# Patient Record
Sex: MALE | Race: WHITE | Employment: OTHER | URBAN - METROPOLITAN AREA
[De-identification: names, ages, dates, MRNs, and addresses within clinical notes are randomized per-mention and may not be internally consistent; named-entity substitution may affect disease eponyms.]

---

## 2017-02-02 PROBLEM — J44.9 COPD (CHRONIC OBSTRUCTIVE PULMONARY DISEASE) (HCC): Status: ACTIVE | Noted: 2017-02-02

## 2021-12-25 ENCOUNTER — APPOINTMENT (OUTPATIENT)
Dept: GENERAL RADIOLOGY | Age: 66
End: 2021-12-25

## 2021-12-25 ENCOUNTER — HOSPITAL ENCOUNTER (EMERGENCY)
Age: 66
Discharge: LEFT BEFORE DISCHARGE | End: 2021-12-25
Attending: EMERGENCY MEDICINE

## 2021-12-25 VITALS
TEMPERATURE: 98.9 F | OXYGEN SATURATION: 92 % | HEIGHT: 72 IN | WEIGHT: 180 LBS | BODY MASS INDEX: 24.38 KG/M2 | HEART RATE: 104 BPM | SYSTOLIC BLOOD PRESSURE: 177 MMHG | DIASTOLIC BLOOD PRESSURE: 96 MMHG | RESPIRATION RATE: 20 BRPM

## 2021-12-25 DIAGNOSIS — Z20.822 SUSPECTED COVID-19 VIRUS INFECTION: Primary | ICD-10-CM

## 2021-12-25 LAB — SARS-COV-2, NAAT: NOT DETECTED

## 2021-12-25 PROCEDURE — 93005 ELECTROCARDIOGRAM TRACING: CPT | Performed by: STUDENT IN AN ORGANIZED HEALTH CARE EDUCATION/TRAINING PROGRAM

## 2021-12-25 PROCEDURE — 87635 SARS-COV-2 COVID-19 AMP PRB: CPT

## 2021-12-25 PROCEDURE — 99283 EMERGENCY DEPT VISIT LOW MDM: CPT

## 2021-12-25 PROCEDURE — 4500000002 HC ER NO CHARGE

## 2021-12-25 ASSESSMENT — PAIN DESCRIPTION - DESCRIPTORS: DESCRIPTORS: ACHING

## 2021-12-25 ASSESSMENT — ENCOUNTER SYMPTOMS
ABDOMINAL PAIN: 0
DIARRHEA: 0
VOMITING: 0
WHEEZING: 0
EYE REDNESS: 0
NAUSEA: 0
COUGH: 0
EYE PAIN: 0
SINUS PRESSURE: 0
EYE DISCHARGE: 0
SHORTNESS OF BREATH: 0
SORE THROAT: 0
BACK PAIN: 0

## 2021-12-25 ASSESSMENT — PAIN SCALES - GENERAL: PAINLEVEL_OUTOF10: 8

## 2021-12-25 ASSESSMENT — PAIN DESCRIPTION - LOCATION: LOCATION: HEAD

## 2021-12-25 ASSESSMENT — PAIN DESCRIPTION - FREQUENCY: FREQUENCY: CONTINUOUS

## 2021-12-25 ASSESSMENT — PAIN DESCRIPTION - PAIN TYPE: TYPE: ACUTE PAIN

## 2021-12-25 NOTE — ED NOTES
Pt arrives ambulatory from triage. Pt c/o covid symptoms. C/O body aches and loss of taste and smell.  Ambulatory pulse ox 89%     [facility] PATRICIA PETIT  12/25/21 1023

## 2021-12-25 NOTE — ED PROVIDER NOTES
The history is provided by the patient and medical records. 69-year-old male presents emergency department complaint of fatigue body aches. He states he does have some slight shortness of breath however that is his normal baseline due to smoking cigarettes. Denies anything worse than usual.  Denies any chest pain. Nothing makes his symptoms better or worse. States he was recommended to his wife in the hospital he was admitted for COVID-19 yesterday however due to this feeling of fatigue and body aches he wanted to come in to get checked out for Covid as well. He otherwise denies any other acute symptoms. The patient presents with fatigue body aches that has been going on for 1 day. These symptoms are moderate in severity. Symptoms are made better by nothing. Symptoms are made worse by nothing. Associated symptoms include none. Review of Systems   Constitutional: Negative for chills and fever. HENT: Negative for ear pain, sinus pressure and sore throat. Eyes: Negative for pain, discharge and redness. Respiratory: Negative for cough, shortness of breath and wheezing. Sob, however no change from base line     Cardiovascular: Negative for chest pain. Gastrointestinal: Negative for abdominal pain, diarrhea, nausea and vomiting. Genitourinary: Negative for dysuria and frequency. Musculoskeletal: Negative for arthralgias and back pain. Skin: Negative for rash and wound. Neurological: Negative for weakness and headaches. Hematological: Negative for adenopathy. All other systems reviewed and are negative. Physical Exam  Vitals and nursing note reviewed. Constitutional:       General: He is not in acute distress. Appearance: Normal appearance. He is well-developed and normal weight. He is not toxic-appearing. HENT:      Head: Normocephalic and atraumatic. Eyes:      General: No scleral icterus.      Conjunctiva/sclera: Conjunctivae normal.   Cardiovascular: Rate and Rhythm: Normal rate and regular rhythm. Heart sounds: Normal heart sounds. No murmur heard. Pulmonary:      Effort: Pulmonary effort is normal. No respiratory distress. Breath sounds: Normal breath sounds. No wheezing or rales. Abdominal:      General: Bowel sounds are normal.      Palpations: Abdomen is soft. Tenderness: There is no abdominal tenderness. There is no guarding or rebound. Musculoskeletal:         General: No swelling, tenderness or deformity. Cervical back: Normal range of motion and neck supple. Skin:     General: Skin is warm and dry. Coloration: Skin is not jaundiced. Neurological:      General: No focal deficit present. Mental Status: He is alert and oriented to person, place, and time. Psychiatric:         Mood and Affect: Mood normal.          Procedures     MDM     70-year-old male present emergency department complaint of fatigue and body aches wanted to get tested for COVID-19 due to his wife recently tested positive and is now mated to the hospital.  Patient did ambulate and pulse ox dropped to 89%. Blood work and testing was ordered. However patient got a call from his brother who apparently is now in town. Patient states he has not seen his brother in 2 years and wants to leave [address] to go see his brother. I did strongly advise him that due to his symptoms and his wife having COVID-19 that he most likely also has COVID-19 and he needs to self quarantine and should not see his brother. Patient was adamant that he needs to go see his brother at this time. And sign out [address] at this time.   Set of vitals here in the emergency department was hemodynamically stable.                    --------------------------------------------- PAST HISTORY ---------------------------------------------  Past Medical History:  has a past medical history of Alcoholic ([facility] [address] [facility] Depression, Depression, Hep B w/ coma, chronic, w/ delta ([facility] [address] Hep C w/ coma, chronic, Heroin abuse ([facility] Heroin abuse ([facility] Sleep apnea, obstructive, Suicide attempt ([facility] and Suicide attempt ([facility] Past Surgical History:  has a past surgical history that includes bronchoscopy (11/30/2015) and Thoracoscopy (Right, 12/04/15). Social History:  reports that he has never smoked. He has never used smokeless tobacco. He reports that he does not drink alcohol and does not use drugs. Family History: family history includes Cancer in his father, mother, and sister. The patients home medications have been reviewed. Allergies: Patient has no known allergies. -------------------------------------------------- RESULTS -------------------------------------------------  Labs:  Results for orders placed or performed during the hospital encounter of 12/25/21   COVID-19, Rapid    Specimen: Nasopharyngeal Swab   Result Value Ref Range    SARS-CoV-2, NAAT Not Detected Not Detected       Radiology:  No results found.    ------------------------- NURSING NOTES AND VITALS REVIEWED ---------------------------  Date / Time Roomed:  12/25/2021  9:54 AM  ED Bed Assignment:  14B/14B-14    The nursing notes within the ED encounter and vital signs as below have been reviewed. BP (!) 177/96   Pulse 104   Temp 98.9 °F (37.2 °C) (Oral)   Resp 20   Ht 6' (1.829 m)   Wt 180 lb (81.6 kg)   SpO2 92%   BMI 24.41 kg/m²   Oxygen Saturation Interpretation: 89% ambulatory, 93% room air rest      ------------------------------------------ PROGRESS NOTES ------------------------------------------  Time: 1030  Re-evaluation. Patients symptoms show no change  Repeat physical examination is not changed    I have spoken with the patient and discussed todays availabe results to this point, in addition to providing specific details for the plan of care and counseling regarding the diagnosis and prognosis.   Their questions are answered, however they are not agreeable to admission.     --------------------------------- ADDITIONAL PROVIDER NOTES ---------------------------------  This patient has chosen to leave against medical advice. I have personally explained to them that choosing to do so may result in permanent bodily harm or death. I discussed at length that without further evaluation and monitoring there may be unforeseen circumstances and deterioration resulting in permanent bodily harm or death as a result of their choice. They are alert, oriented, and competent at this time. They state that they are aware of the serious risks as explained, but they continue to wish to leave against medical   advice. In light of their decision to leave against medical advice, follow-up has been arranged and they are aware of the importance of following up as instructed. They have been advised that they should return to the ED immediately if they change their mind at any time, or if their condition begins to change or worsen. The follow up plan has been discussed in detail and they are aware of the specific conditions for emergent return, as well as the importance of follow-up. New Prescriptions    No medications on file       Diagnosis:  1. Suspected COVID-19 virus infection        Disposition:  Patient's disposition: Left against medical advice. Patient's condition is stable.        Rae Munoz MD  Resident  12/25/21 7534

## 2021-12-25 NOTE — ED NOTES
Bed: 14B-14  Expected date:   Expected time:   Means of arrival:   Comments:  Triage      Michael Marin RN  12/25/21 0657

## 2021-12-28 LAB
EKG ATRIAL RATE: 88 BPM
EKG P AXIS: 44 DEGREES
EKG P-R INTERVAL: 112 MS
EKG Q-T INTERVAL: 388 MS
EKG QRS DURATION: 96 MS
EKG QTC CALCULATION (BAZETT): 469 MS
EKG R AXIS: 75 DEGREES
EKG T AXIS: 62 DEGREES
EKG VENTRICULAR RATE: 88 BPM

## 2022-03-26 ENCOUNTER — HOSPITAL ENCOUNTER (INPATIENT)
Age: 67
LOS: 4 days | Discharge: HOME | DRG: 392 | End: 2022-03-30
Attending: EMERGENCY MEDICINE | Admitting: FAMILY MEDICINE
Payer: OTHER GOVERNMENT

## 2022-03-26 ENCOUNTER — APPOINTMENT (OUTPATIENT)
Dept: GENERAL RADIOLOGY | Age: 67
DRG: 392 | End: 2022-03-26
Payer: OTHER GOVERNMENT

## 2022-03-26 ENCOUNTER — APPOINTMENT (OUTPATIENT)
Dept: CT IMAGING | Age: 67
DRG: 392 | End: 2022-03-26
Payer: OTHER GOVERNMENT

## 2022-03-26 DIAGNOSIS — R19.7 NAUSEA VOMITING AND DIARRHEA: ICD-10-CM

## 2022-03-26 DIAGNOSIS — R10.9 ABDOMINAL PAIN, UNSPECIFIED ABDOMINAL LOCATION: Primary | ICD-10-CM

## 2022-03-26 DIAGNOSIS — E87.6 HYPOKALEMIA: ICD-10-CM

## 2022-03-26 DIAGNOSIS — R11.2 NAUSEA VOMITING AND DIARRHEA: ICD-10-CM

## 2022-03-26 DIAGNOSIS — R94.31 QT PROLONGATION: ICD-10-CM

## 2022-03-26 LAB
ALBUMIN SERPL-MCNC: 4.3 G/DL (ref 3.5–5.2)
ALP BLD-CCNC: 81 U/L (ref 40–129)
ALT SERPL-CCNC: 15 U/L (ref 0–40)
AMPHETAMINE SCREEN, URINE: NOT DETECTED
ANION GAP SERPL CALCULATED.3IONS-SCNC: 20 MMOL/L (ref 7–16)
AST SERPL-CCNC: 20 U/L (ref 0–39)
BACTERIA: ABNORMAL /HPF
BARBITURATE SCREEN URINE: NOT DETECTED
BASOPHILS ABSOLUTE: 0.02 E9/L (ref 0–0.2)
BASOPHILS RELATIVE PERCENT: 0.2 % (ref 0–2)
BENZODIAZEPINE SCREEN, URINE: NOT DETECTED
BILIRUB SERPL-MCNC: 0.4 MG/DL (ref 0–1.2)
BILIRUBIN URINE: ABNORMAL
BLOOD, URINE: ABNORMAL
BUN BLDV-MCNC: 33 MG/DL (ref 6–23)
CALCIUM SERPL-MCNC: 9.9 MG/DL (ref 8.6–10.2)
CANNABINOID SCREEN URINE: NOT DETECTED
CHLORIDE BLD-SCNC: 95 MMOL/L (ref 98–107)
CLARITY: CLEAR
CO2: 24 MMOL/L (ref 22–29)
COCAINE METABOLITE SCREEN URINE: NOT DETECTED
COLOR: YELLOW
CREAT SERPL-MCNC: 1.1 MG/DL (ref 0.7–1.2)
EOSINOPHILS ABSOLUTE: 0 E9/L (ref 0.05–0.5)
EOSINOPHILS RELATIVE PERCENT: 0 % (ref 0–6)
ETHANOL: <10 MG/DL (ref 0–0.08)
FENTANYL SCREEN, URINE: POSITIVE
GFR AFRICAN AMERICAN: >60
GFR NON-AFRICAN AMERICAN: >60 ML/MIN/1.73
GLUCOSE BLD-MCNC: 150 MG/DL (ref 74–99)
GLUCOSE URINE: NEGATIVE MG/DL
HCT VFR BLD CALC: 46.7 % (ref 37–54)
HEMOGLOBIN: 16.1 G/DL (ref 12.5–16.5)
HYALINE CASTS: ABNORMAL /LPF (ref 0–2)
IMMATURE GRANULOCYTES #: 0.05 E9/L
IMMATURE GRANULOCYTES %: 0.6 % (ref 0–5)
KETONES, URINE: 15 MG/DL
LACTIC ACID: 2 MMOL/L (ref 0.5–2.2)
LACTIC ACID: 4.5 MMOL/L (ref 0.5–2.2)
LACTIC ACID: 5.4 MMOL/L (ref 0.5–2.2)
LEUKOCYTE ESTERASE, URINE: NEGATIVE
LIPASE: 17 U/L (ref 13–60)
LYMPHOCYTES ABSOLUTE: 1.13 E9/L (ref 1.5–4)
LYMPHOCYTES RELATIVE PERCENT: 13.3 % (ref 20–42)
Lab: ABNORMAL
MAGNESIUM: 1.6 MG/DL (ref 1.6–2.6)
MCH RBC QN AUTO: 30.6 PG (ref 26–35)
MCHC RBC AUTO-ENTMCNC: 34.5 % (ref 32–34.5)
MCV RBC AUTO: 88.8 FL (ref 80–99.9)
METHADONE SCREEN, URINE: POSITIVE
MONOCYTES ABSOLUTE: 0.39 E9/L (ref 0.1–0.95)
MONOCYTES RELATIVE PERCENT: 4.6 % (ref 2–12)
NEUTROPHILS ABSOLUTE: 6.88 E9/L (ref 1.8–7.3)
NEUTROPHILS RELATIVE PERCENT: 81.3 % (ref 43–80)
NITRITE, URINE: NEGATIVE
OPIATE SCREEN URINE: NOT DETECTED
OXYCODONE URINE: NOT DETECTED
PDW BLD-RTO: 13.8 FL (ref 11.5–15)
PH UA: 6 (ref 5–9)
PHENCYCLIDINE SCREEN URINE: NOT DETECTED
PLATELET # BLD: 329 E9/L (ref 130–450)
PMV BLD AUTO: 10.2 FL (ref 7–12)
POTASSIUM SERPL-SCNC: 3.1 MMOL/L (ref 3.5–5)
PROTEIN UA: 100 MG/DL
RBC # BLD: 5.26 E12/L (ref 3.8–5.8)
RBC UA: ABNORMAL /HPF (ref 0–2)
REASON FOR REJECTION: NORMAL
REJECTED TEST: NORMAL
SODIUM BLD-SCNC: 139 MMOL/L (ref 132–146)
SPECIFIC GRAVITY UA: 1.02 (ref 1–1.03)
TOTAL PROTEIN: 9 G/DL (ref 6.4–8.3)
TROPONIN, HIGH SENSITIVITY: 10 NG/L (ref 0–11)
UROBILINOGEN, URINE: 0.2 E.U./DL
WBC # BLD: 8.5 E9/L (ref 4.5–11.5)
WBC UA: ABNORMAL /HPF (ref 0–5)

## 2022-03-26 PROCEDURE — 6370000000 HC RX 637 (ALT 250 FOR IP): Performed by: FAMILY MEDICINE

## 2022-03-26 PROCEDURE — 87040 BLOOD CULTURE FOR BACTERIA: CPT

## 2022-03-26 PROCEDURE — 36415 COLL VENOUS BLD VENIPUNCTURE: CPT

## 2022-03-26 PROCEDURE — 93005 ELECTROCARDIOGRAM TRACING: CPT | Performed by: NURSE PRACTITIONER

## 2022-03-26 PROCEDURE — 6360000002 HC RX W HCPCS: Performed by: FAMILY MEDICINE

## 2022-03-26 PROCEDURE — 83690 ASSAY OF LIPASE: CPT

## 2022-03-26 PROCEDURE — 80307 DRUG TEST PRSMV CHEM ANLYZR: CPT

## 2022-03-26 PROCEDURE — 82077 ASSAY SPEC XCP UR&BREATH IA: CPT

## 2022-03-26 PROCEDURE — 83605 ASSAY OF LACTIC ACID: CPT

## 2022-03-26 PROCEDURE — A4216 STERILE WATER/SALINE, 10 ML: HCPCS | Performed by: FAMILY MEDICINE

## 2022-03-26 PROCEDURE — 99285 EMERGENCY DEPT VISIT HI MDM: CPT

## 2022-03-26 PROCEDURE — 80053 COMPREHEN METABOLIC PANEL: CPT

## 2022-03-26 PROCEDURE — 2580000003 HC RX 258: Performed by: FAMILY MEDICINE

## 2022-03-26 PROCEDURE — 74177 CT ABD & PELVIS W/CONTRAST: CPT

## 2022-03-26 PROCEDURE — 85025 COMPLETE CBC W/AUTO DIFF WBC: CPT

## 2022-03-26 PROCEDURE — 96372 THER/PROPH/DIAG INJ SC/IM: CPT

## 2022-03-26 PROCEDURE — 2580000003 HC RX 258: Performed by: NURSE PRACTITIONER

## 2022-03-26 PROCEDURE — 6360000004 HC RX CONTRAST MEDICATION: Performed by: RADIOLOGY

## 2022-03-26 PROCEDURE — 87088 URINE BACTERIA CULTURE: CPT

## 2022-03-26 PROCEDURE — 6360000002 HC RX W HCPCS: Performed by: EMERGENCY MEDICINE

## 2022-03-26 PROCEDURE — C9113 INJ PANTOPRAZOLE SODIUM, VIA: HCPCS | Performed by: FAMILY MEDICINE

## 2022-03-26 PROCEDURE — 83735 ASSAY OF MAGNESIUM: CPT

## 2022-03-26 PROCEDURE — 6360000002 HC RX W HCPCS: Performed by: NURSE PRACTITIONER

## 2022-03-26 PROCEDURE — 2580000003 HC RX 258: Performed by: EMERGENCY MEDICINE

## 2022-03-26 PROCEDURE — 96374 THER/PROPH/DIAG INJ IV PUSH: CPT

## 2022-03-26 PROCEDURE — 81001 URINALYSIS AUTO W/SCOPE: CPT

## 2022-03-26 PROCEDURE — 96361 HYDRATE IV INFUSION ADD-ON: CPT

## 2022-03-26 PROCEDURE — 71045 X-RAY EXAM CHEST 1 VIEW: CPT

## 2022-03-26 PROCEDURE — 2580000003 HC RX 258: Performed by: RADIOLOGY

## 2022-03-26 PROCEDURE — 84484 ASSAY OF TROPONIN QUANT: CPT

## 2022-03-26 PROCEDURE — 1200000000 HC SEMI PRIVATE

## 2022-03-26 RX ORDER — MAGNESIUM SULFATE IN WATER 40 MG/ML
2000 INJECTION, SOLUTION INTRAVENOUS ONCE
Status: COMPLETED | OUTPATIENT
Start: 2022-03-26 | End: 2022-03-26

## 2022-03-26 RX ORDER — LORAZEPAM 2 MG/ML
3 INJECTION INTRAMUSCULAR
Status: DISCONTINUED | OUTPATIENT
Start: 2022-03-26 | End: 2022-03-30 | Stop reason: HOSPADM

## 2022-03-26 RX ORDER — METOCLOPRAMIDE HYDROCHLORIDE 5 MG/ML
10 INJECTION INTRAMUSCULAR; INTRAVENOUS ONCE
Status: COMPLETED | OUTPATIENT
Start: 2022-03-26 | End: 2022-03-26

## 2022-03-26 RX ORDER — MULTIVITAMIN WITH IRON
1 TABLET ORAL DAILY
Status: DISCONTINUED | OUTPATIENT
Start: 2022-03-26 | End: 2022-03-30 | Stop reason: HOSPADM

## 2022-03-26 RX ORDER — DOXAZOSIN MESYLATE 1 MG/1
1 TABLET ORAL DAILY
Status: DISCONTINUED | OUTPATIENT
Start: 2022-03-26 | End: 2022-03-30 | Stop reason: HOSPADM

## 2022-03-26 RX ORDER — HYDRALAZINE HYDROCHLORIDE 20 MG/ML
10 INJECTION INTRAMUSCULAR; INTRAVENOUS EVERY 6 HOURS PRN
Status: DISCONTINUED | OUTPATIENT
Start: 2022-03-26 | End: 2022-03-26 | Stop reason: SDUPTHER

## 2022-03-26 RX ORDER — GAUZE BANDAGE 2" X 2"
100 BANDAGE TOPICAL DAILY
Status: DISCONTINUED | OUTPATIENT
Start: 2022-03-26 | End: 2022-03-30 | Stop reason: HOSPADM

## 2022-03-26 RX ORDER — LORAZEPAM 1 MG/1
4 TABLET ORAL
Status: DISCONTINUED | OUTPATIENT
Start: 2022-03-26 | End: 2022-03-30 | Stop reason: HOSPADM

## 2022-03-26 RX ORDER — 0.9 % SODIUM CHLORIDE 0.9 %
1000 INTRAVENOUS SOLUTION INTRAVENOUS ONCE
Status: COMPLETED | OUTPATIENT
Start: 2022-03-26 | End: 2022-03-26

## 2022-03-26 RX ORDER — ACETAMINOPHEN 650 MG/1
650 SUPPOSITORY RECTAL EVERY 6 HOURS PRN
Status: DISCONTINUED | OUTPATIENT
Start: 2022-03-26 | End: 2022-03-30 | Stop reason: HOSPADM

## 2022-03-26 RX ORDER — AMLODIPINE BESYLATE 5 MG/1
5 TABLET ORAL DAILY
Status: DISCONTINUED | OUTPATIENT
Start: 2022-03-26 | End: 2022-03-29

## 2022-03-26 RX ORDER — SODIUM CHLORIDE 9 MG/ML
25 INJECTION, SOLUTION INTRAVENOUS PRN
Status: DISCONTINUED | OUTPATIENT
Start: 2022-03-26 | End: 2022-03-30 | Stop reason: HOSPADM

## 2022-03-26 RX ORDER — SODIUM CHLORIDE 0.9 % (FLUSH) 0.9 %
5-40 SYRINGE (ML) INJECTION EVERY 12 HOURS SCHEDULED
Status: DISCONTINUED | OUTPATIENT
Start: 2022-03-26 | End: 2022-03-30 | Stop reason: HOSPADM

## 2022-03-26 RX ORDER — ONDANSETRON 2 MG/ML
4 INJECTION INTRAMUSCULAR; INTRAVENOUS EVERY 4 HOURS PRN
Status: DISCONTINUED | OUTPATIENT
Start: 2022-03-26 | End: 2022-03-26

## 2022-03-26 RX ORDER — METOPROLOL SUCCINATE 25 MG/1
25 TABLET, EXTENDED RELEASE ORAL 2 TIMES DAILY
Status: DISCONTINUED | OUTPATIENT
Start: 2022-03-26 | End: 2022-03-29

## 2022-03-26 RX ORDER — FOLIC ACID 1 MG/1
1 TABLET ORAL DAILY
Status: DISCONTINUED | OUTPATIENT
Start: 2022-03-26 | End: 2022-03-30 | Stop reason: HOSPADM

## 2022-03-26 RX ORDER — SODIUM CHLORIDE 0.9 % (FLUSH) 0.9 %
5-40 SYRINGE (ML) INJECTION PRN
Status: DISCONTINUED | OUTPATIENT
Start: 2022-03-26 | End: 2022-03-30 | Stop reason: HOSPADM

## 2022-03-26 RX ORDER — ACETAMINOPHEN 325 MG/1
650 TABLET ORAL EVERY 6 HOURS PRN
Status: DISCONTINUED | OUTPATIENT
Start: 2022-03-26 | End: 2022-03-30 | Stop reason: HOSPADM

## 2022-03-26 RX ORDER — LORAZEPAM 2 MG/ML
2 INJECTION INTRAMUSCULAR
Status: DISCONTINUED | OUTPATIENT
Start: 2022-03-26 | End: 2022-03-30 | Stop reason: HOSPADM

## 2022-03-26 RX ORDER — HYDRALAZINE HYDROCHLORIDE 20 MG/ML
10 INJECTION INTRAMUSCULAR; INTRAVENOUS ONCE
Status: COMPLETED | OUTPATIENT
Start: 2022-03-26 | End: 2022-03-26

## 2022-03-26 RX ORDER — FENTANYL CITRATE 50 UG/ML
25 INJECTION, SOLUTION INTRAMUSCULAR; INTRAVENOUS ONCE
Status: COMPLETED | OUTPATIENT
Start: 2022-03-26 | End: 2022-03-26

## 2022-03-26 RX ORDER — POTASSIUM CHLORIDE 7.45 MG/ML
10 INJECTION INTRAVENOUS
Status: DISPENSED | OUTPATIENT
Start: 2022-03-26 | End: 2022-03-26

## 2022-03-26 RX ORDER — SODIUM CHLORIDE 0.9 % (FLUSH) 0.9 %
10 SYRINGE (ML) INJECTION PRN
Status: DISCONTINUED | OUTPATIENT
Start: 2022-03-26 | End: 2022-03-30 | Stop reason: HOSPADM

## 2022-03-26 RX ORDER — ARFORMOTEROL TARTRATE 15 UG/2ML
15 SOLUTION RESPIRATORY (INHALATION) 2 TIMES DAILY
Status: DISCONTINUED | OUTPATIENT
Start: 2022-03-26 | End: 2022-03-30 | Stop reason: HOSPADM

## 2022-03-26 RX ORDER — IPRATROPIUM BROMIDE AND ALBUTEROL SULFATE 2.5; .5 MG/3ML; MG/3ML
3 SOLUTION RESPIRATORY (INHALATION) EVERY 4 HOURS PRN
Status: DISCONTINUED | OUTPATIENT
Start: 2022-03-26 | End: 2022-03-30 | Stop reason: HOSPADM

## 2022-03-26 RX ORDER — MAGNESIUM HYDROXIDE/ALUMINUM HYDROXICE/SIMETHICONE 120; 1200; 1200 MG/30ML; MG/30ML; MG/30ML
30 SUSPENSION ORAL EVERY 6 HOURS PRN
Status: DISCONTINUED | OUTPATIENT
Start: 2022-03-26 | End: 2022-03-30 | Stop reason: HOSPADM

## 2022-03-26 RX ORDER — MAGNESIUM SULFATE HEPTAHYDRATE 500 MG/ML
2000 INJECTION, SOLUTION INTRAMUSCULAR; INTRAVENOUS ONCE
Status: DISCONTINUED | OUTPATIENT
Start: 2022-03-26 | End: 2022-03-26 | Stop reason: CLARIF

## 2022-03-26 RX ORDER — HYDRALAZINE HYDROCHLORIDE 20 MG/ML
10 INJECTION INTRAMUSCULAR; INTRAVENOUS EVERY 6 HOURS PRN
Status: DISCONTINUED | OUTPATIENT
Start: 2022-03-26 | End: 2022-03-30 | Stop reason: HOSPADM

## 2022-03-26 RX ORDER — ONDANSETRON 2 MG/ML
4 INJECTION INTRAMUSCULAR; INTRAVENOUS ONCE
Status: COMPLETED | OUTPATIENT
Start: 2022-03-26 | End: 2022-03-26

## 2022-03-26 RX ORDER — BUDESONIDE 0.5 MG/2ML
500 INHALANT ORAL 2 TIMES DAILY
Status: DISCONTINUED | OUTPATIENT
Start: 2022-03-26 | End: 2022-03-30 | Stop reason: HOSPADM

## 2022-03-26 RX ORDER — LORAZEPAM 2 MG/ML
4 INJECTION INTRAMUSCULAR
Status: DISCONTINUED | OUTPATIENT
Start: 2022-03-26 | End: 2022-03-30 | Stop reason: HOSPADM

## 2022-03-26 RX ORDER — POLYETHYLENE GLYCOL 3350 17 G/17G
17 POWDER, FOR SOLUTION ORAL DAILY PRN
Status: DISCONTINUED | OUTPATIENT
Start: 2022-03-26 | End: 2022-03-30 | Stop reason: HOSPADM

## 2022-03-26 RX ORDER — LORAZEPAM 1 MG/1
3 TABLET ORAL
Status: DISCONTINUED | OUTPATIENT
Start: 2022-03-26 | End: 2022-03-30 | Stop reason: HOSPADM

## 2022-03-26 RX ORDER — METHADONE HYDROCHLORIDE 10 MG/ML
110 CONCENTRATE ORAL DAILY
Status: DISCONTINUED | OUTPATIENT
Start: 2022-03-26 | End: 2022-03-30 | Stop reason: HOSPADM

## 2022-03-26 RX ORDER — LORAZEPAM 1 MG/1
2 TABLET ORAL
Status: DISCONTINUED | OUTPATIENT
Start: 2022-03-26 | End: 2022-03-30 | Stop reason: HOSPADM

## 2022-03-26 RX ORDER — LORAZEPAM 2 MG/ML
1 INJECTION INTRAMUSCULAR
Status: DISCONTINUED | OUTPATIENT
Start: 2022-03-26 | End: 2022-03-30 | Stop reason: HOSPADM

## 2022-03-26 RX ORDER — CALCIUM CARBONATE 200(500)MG
500 TABLET,CHEWABLE ORAL 3 TIMES DAILY PRN
Status: DISCONTINUED | OUTPATIENT
Start: 2022-03-26 | End: 2022-03-30 | Stop reason: HOSPADM

## 2022-03-26 RX ORDER — LORAZEPAM 1 MG/1
1 TABLET ORAL
Status: DISCONTINUED | OUTPATIENT
Start: 2022-03-26 | End: 2022-03-30 | Stop reason: HOSPADM

## 2022-03-26 RX ADMIN — ONDANSETRON 4 MG: 2 INJECTION INTRAMUSCULAR; INTRAVENOUS at 01:28

## 2022-03-26 RX ADMIN — METOPROLOL SUCCINATE 25 MG: 25 TABLET, EXTENDED RELEASE ORAL at 15:37

## 2022-03-26 RX ADMIN — DOXAZOSIN 1 MG: 1 TABLET ORAL at 15:37

## 2022-03-26 RX ADMIN — IOPAMIDOL 90 ML: 755 INJECTION, SOLUTION INTRAVENOUS at 04:21

## 2022-03-26 RX ADMIN — SODIUM CHLORIDE 40 MG: 9 INJECTION INTRAMUSCULAR; INTRAVENOUS; SUBCUTANEOUS at 09:27

## 2022-03-26 RX ADMIN — AMLODIPINE BESYLATE 5 MG: 5 TABLET ORAL at 15:37

## 2022-03-26 RX ADMIN — THIAMINE HCL TAB 100 MG 100 MG: 100 TAB at 15:37

## 2022-03-26 RX ADMIN — METOPROLOL SUCCINATE 25 MG: 25 TABLET, EXTENDED RELEASE ORAL at 19:59

## 2022-03-26 RX ADMIN — MAGNESIUM SULFATE HEPTAHYDRATE 2000 MG: 40 INJECTION, SOLUTION INTRAVENOUS at 05:34

## 2022-03-26 RX ADMIN — FENTANYL CITRATE 25 MCG: 50 INJECTION INTRAMUSCULAR; INTRAVENOUS at 02:10

## 2022-03-26 RX ADMIN — ACETAMINOPHEN 650 MG: 325 TABLET ORAL at 23:34

## 2022-03-26 RX ADMIN — Medication 10 ML: at 20:00

## 2022-03-26 RX ADMIN — HYDRALAZINE HYDROCHLORIDE 10 MG: 20 INJECTION INTRAMUSCULAR; INTRAVENOUS at 05:18

## 2022-03-26 RX ADMIN — POTASSIUM CHLORIDE 10 MEQ: 7.46 INJECTION, SOLUTION INTRAVENOUS at 05:23

## 2022-03-26 RX ADMIN — Medication 10 ML: at 04:21

## 2022-03-26 RX ADMIN — Medication 110 MG: at 17:32

## 2022-03-26 RX ADMIN — ENOXAPARIN SODIUM 40 MG: 100 INJECTION SUBCUTANEOUS at 15:37

## 2022-03-26 RX ADMIN — FOLIC ACID 1 MG: 1 TABLET ORAL at 15:37

## 2022-03-26 RX ADMIN — TRIMETHOBENZAMIDE HYDROCHLORIDE 200 MG: 100 INJECTION INTRAMUSCULAR at 02:49

## 2022-03-26 RX ADMIN — METOCLOPRAMIDE HYDROCHLORIDE 10 MG: 5 INJECTION INTRAMUSCULAR; INTRAVENOUS at 05:21

## 2022-03-26 RX ADMIN — SODIUM CHLORIDE 1000 ML: 9 INJECTION, SOLUTION INTRAVENOUS at 02:55

## 2022-03-26 RX ADMIN — SODIUM CHLORIDE 1000 ML: 9 INJECTION, SOLUTION INTRAVENOUS at 01:46

## 2022-03-26 RX ADMIN — LORAZEPAM 1 MG: 1 TABLET ORAL at 20:01

## 2022-03-26 RX ADMIN — ACETAMINOPHEN 650 MG: 325 TABLET ORAL at 15:37

## 2022-03-26 RX ADMIN — SODIUM CHLORIDE 40 MG: 9 INJECTION INTRAMUSCULAR; INTRAVENOUS; SUBCUTANEOUS at 19:58

## 2022-03-26 RX ADMIN — TRIMETHOBENZAMIDE HYDROCHLORIDE 200 MG: 100 INJECTION INTRAMUSCULAR at 09:17

## 2022-03-26 ASSESSMENT — PAIN SCALES - GENERAL
PAINLEVEL_OUTOF10: 10
PAINLEVEL_OUTOF10: 9
PAINLEVEL_OUTOF10: 9
PAINLEVEL_OUTOF10: 3
PAINLEVEL_OUTOF10: 7
PAINLEVEL_OUTOF10: 8

## 2022-03-26 ASSESSMENT — PAIN DESCRIPTION - LOCATION
LOCATION: HEAD
LOCATION: ABDOMEN
LOCATION: HEAD
LOCATION: ABDOMEN

## 2022-03-26 ASSESSMENT — PAIN DESCRIPTION - FREQUENCY
FREQUENCY: CONTINUOUS

## 2022-03-26 ASSESSMENT — PAIN DESCRIPTION - PROGRESSION: CLINICAL_PROGRESSION: NOT CHANGED

## 2022-03-26 ASSESSMENT — PAIN DESCRIPTION - DESCRIPTORS
DESCRIPTORS: SHARP
DESCRIPTORS: ACHING
DESCRIPTORS: ACHING

## 2022-03-26 ASSESSMENT — PAIN DESCRIPTION - PAIN TYPE
TYPE: ACUTE PAIN

## 2022-03-26 ASSESSMENT — PAIN DESCRIPTION - ONSET: ONSET: SUDDEN

## 2022-03-26 ASSESSMENT — PAIN DESCRIPTION - ORIENTATION: ORIENTATION: MID

## 2022-03-26 NOTE — ED NOTES
Patient's oxygen level 88% on room air, placed patient on 3L, pulse ox 95%     Mg Barbosa RN  03/26/22 [address], RN  03/26/22 8820

## 2022-03-26 NOTE — ED NOTES
Angle Landers RN notified [facility] faxed, patient placed in transport & POC discussed.      Mayra Cristobal RN  03/26/22 4364

## 2022-03-26 NOTE — ED PROVIDER NOTES
HPI:  3/26/22, Time: 1:00 AM EDT         Deann Vivas is a 78 y.o. male presenting to the ED for nausea vomiting and diarrhea, beginning 2 days ago. The complaint has been persistent, moderate in severity, and worsened by nothing. Patient reporting nausea vomiting and diarrhea and reporting no black or tarry stools. Patient reporting abdominal pain no chest pain no difficulty breathing reports no syncopal event. Patient reporting no fever no chills. Patient reporting no leg pain. Patient reporting feeling weak. He reports no urinary symptoms. ROS:   Pertinent positives and negatives are stated within HPI, all other systems reviewed and are negative.  --------------------------------------------- PAST HISTORY ---------------------------------------------  Past Medical History:  has a past medical history of Alcoholic ([facility] [address] CHF (congestive heart failure) ([facility] [address] Depression, Depression, Hep B w/ coma, chronic, w/ delta ([facility] [address] Hep C w/ coma, chronic, [facility] ([facility] Sleep apnea, obstructive, [facility] ([facility] and Suicide attempt ([facility] Past Surgical History:  has a past surgical history that includes bronchoscopy (11/30/2015) and Thoracoscopy (Right, 12/04/15). Social History:  reports that he has never smoked. He has never used smokeless tobacco. He reports that he does not drink alcohol and does not use drugs. Family History: family history includes Cancer in his father, mother, and sister. The patients home medications have been reviewed. Allergies: Patient has no known allergies.     ---------------------------------------------------PHYSICAL EXAM--------------------------------------    Constitutional/General: Alert and oriented x3, mild distress pale appearing  Head: Normocephalic and atraumatic  Eyes: PERRL, EOMI  Mouth: Oropharynx clear, handling secretions, no trismus  Neck: Supple, full ROM, non tender to palpation in the midline, no stridor, no crepitus, no meningeal signs  Pulmonary: Lungs clear to auscultation bilaterally, no wheezes, rales, or rhonchi. Not in respiratory distress  Cardiovascular:  Regular rate. Regular rhythm. No murmurs, gallops, or rubs. 2+ distal pulses  Chest: no chest wall tenderness  Abdomen: Soft. Tender throughout. Non distended. +BS. No rebound, guarding, or rigidity. No pulsatile masses appreciated. Musculoskeletal: Moves all extremities x 4. Warm and well perfused, no clubbing, cyanosis, or edema. Capillary refill <3 seconds  Skin: warm and dry. No rashes. Neurologic: GCS 15, CN 2-12 grossly intact, no focal deficits, symmetric strength 5/5 in the upper and lower extremities bilaterally  Psych: Normal Affect    -------------------------------------------------- RESULTS -------------------------------------------------  I have personally reviewed all laboratory and imaging results for this patient. Results are listed below.      LABS:  Results for orders placed or performed during the hospital encounter of 03/26/22   CBC with Auto Differential   Result Value Ref Range    WBC 8.5 4.5 - 11.5 E9/L    RBC 5.26 3.80 - 5.80 E12/L    Hemoglobin 16.1 12.5 - 16.5 g/dL    Hematocrit 46.7 37.0 - 54.0 %    MCV 88.8 80.0 - 99.9 fL    MCH 30.6 26.0 - 35.0 pg    MCHC 34.5 32.0 - 34.5 %    RDW 13.8 11.5 - 15.0 fL    Platelets 004 544 - 112 E9/L    MPV 10.2 7.0 - 12.0 fL    Neutrophils % 81.3 (H) 43.0 - 80.0 %    Immature Granulocytes % 0.6 0.0 - 5.0 %    Lymphocytes % 13.3 (L) 20.0 - 42.0 %    Monocytes % 4.6 2.0 - 12.0 %    Eosinophils % 0.0 0.0 - 6.0 %    Basophils % 0.2 0.0 - 2.0 %    Neutrophils Absolute 6.88 1.80 - 7.30 E9/L    Immature Granulocytes # 0.05 E9/L    Lymphocytes Absolute 1.13 (L) 1.50 - 4.00 E9/L    Monocytes Absolute 0.39 0.10 - 0.95 E9/L    Eosinophils Absolute 0.00 (L) 0.05 - 0.50 E9/L    Basophils Absolute 0.02 0.00 - 0.20 E9/L   Lactic Acid   Result Value Ref Range    Lactic Acid 5.4 (HH) 0.5 - 2.2 mmol/L   Lipase Result Value Ref Range    Lipase 17 13 - 60 U/L   Urinalysis   Result Value Ref Range    Color, UA Yellow Straw/Yellow    Clarity, UA Clear Clear    Glucose, Ur Negative Negative mg/dL    Bilirubin Urine SMALL (A) Negative    Ketones, Urine 15 (A) Negative mg/dL    Specific Gravity, UA 1.025 1.005 - 1.030    Blood, Urine LARGE (A) Negative    pH, UA 6.0 5.0 - 9.0    Protein,  (A) Negative mg/dL    Urobilinogen, Urine 0.2 <2.0 E.U./dL    Nitrite, Urine Negative Negative    Leukocyte Esterase, Urine Negative Negative   URINE DRUG SCREEN   Result Value Ref Range    Amphetamine Screen, Urine NOT DETECTED Negative <1000 ng/mL    Barbiturate Screen, Ur NOT DETECTED Negative < 200 ng/mL    Benzodiazepine Screen, Urine NOT DETECTED Negative < 200 ng/mL    Cannabinoid Scrn, Ur NOT DETECTED Negative < 50ng/mL    Cocaine Metabolite Screen, Urine NOT DETECTED Negative < 300 ng/mL    Opiate Scrn, Ur NOT DETECTED Negative < 300ng/mL    PCP Screen, Urine NOT DETECTED Negative < 25 ng/mL    Methadone Screen, Urine POSITIVE (A) Negative <300 ng/mL    Oxycodone Urine NOT DETECTED Negative <100 ng/mL    FENTANYL SCREEN, URINE POSITIVE (A) Negative <1 ng/mL    Drug Screen Comment: see below    SPECIMEN REJECTION   Result Value Ref Range    Rejected Test trp5, cmp     Reason for Rejection see below    Comprehensive Metabolic Panel   Result Value Ref Range    Sodium 139 132 - 146 mmol/L    Potassium 3.1 (L) 3.5 - 5.0 mmol/L    Chloride 95 (L) 98 - 107 mmol/L    CO2 24 22 - 29 mmol/L    Anion Gap 20 (H) 7 - 16 mmol/L    Glucose 150 (H) 74 - 99 mg/dL    BUN 33 (H) 6 - 23 mg/dL    CREATININE 1.1 0.7 - 1.2 mg/dL    GFR Non-African American >60 >=60 mL/min/1.73    GFR African American >60     Calcium 9.9 8.6 - 10.2 mg/dL    Total Protein 9.0 (H) 6.4 - 8.3 g/dL    Albumin 4.3 3.5 - 5.2 g/dL    Total Bilirubin 0.4 0.0 - 1.2 mg/dL    Alkaline Phosphatase 81 40 - 129 U/L    ALT 15 0 - 40 U/L    AST 20 0 - 39 U/L   Troponin   Result Value Ref Range    Troponin, High Sensitivity 10 0 - 11 ng/L   Microscopic Urinalysis   Result Value Ref Range    Hyaline Casts, UA 3-5 (A) 0 - 2 /LPF    WBC, UA NONE 0 - 5 /HPF    RBC, UA 5-10 (A) 0 - 2 /HPF    Bacteria, UA MODERATE (A) None Seen /HPF   Magnesium   Result Value Ref Range    Magnesium 1.6 1.6 - 2.6 mg/dL   Lactic Acid   Result Value Ref Range    Lactic Acid 4.5 (HH) 0.5 - 2.2 mmol/L   EKG 12 Lead   Result Value Ref Range    Ventricular Rate 89 BPM    Atrial Rate 89 BPM    P-R Interval 130 ms    QRS Duration 92 ms    Q-T Interval 484 ms    QTc Calculation (Bazett) 588 ms    P Axis 61 degrees    R Axis 65 degrees    T Axis 65 degrees       RADIOLOGY:  Interpreted by Radiologist.  CT ABDOMEN PELVIS W IV CONTRAST Additional Contrast? None   Final Result   No acute abdominopelvic abnormality. Focus of intravesicular gas, likely related to recent instrumentation. Clinical correlation recommended. Correlate with urinalysis to exclude   infection. Thickening of the distal esophageal wall, which could suggest reflux   esophagitis. Clinical correlation recommended. XR CHEST PORTABLE   Final Result   No acute cardiopulmonary abnormality. EKG:  This EKG is signed and interpreted by me. Rate: 89  Rhythm: Sinus  Interpretation: non-specific EKG, QT is prolonged  Comparison: Compared to previous          ------------------------- NURSING NOTES AND VITALS REVIEWED ---------------------------   The nursing notes within the ED encounter and vital signs as below have been reviewed by myself. BP (!) 165/76   Pulse 87   Temp 97.2 °F (36.2 °C)   Resp 18   Ht 6' (1.829 m)   Wt 175 lb (79.4 kg)   SpO2 100%   BMI 23.73 kg/m²   Oxygen Saturation Interpretation: Normal    The patients available past medical records and past encounters were reviewed.         ------------------------------ ED COURSE/MEDICAL DECISION MAKING----------------------  Medications   trimethobenzamide Rosalina Parish) injection 200 mg (200 mg IntraMUSCular Given 3/26/22 0249)   sodium chloride flush 0.9 % injection 10 mL (10 mLs IntraVENous Given 3/26/22 0421)   potassium chloride 10 mEq/100 mL IVPB (Peripheral Line) (10 mEq IntraVENous New Bag 3/26/22 0523)   magnesium sulfate 2000 mg in 50 mL IVPB premix (2,000 mg IntraVENous New Bag 3/26/22 0534)   0.9 % sodium chloride bolus (0 mLs IntraVENous Stopped 3/26/22 0251)   ondansetron (ZOFRAN) injection 4 mg (4 mg IntraVENous Given 3/26/22 0128)   fentaNYL (SUBLIMAZE) injection 25 mcg (25 mcg IntraVENous Given 3/26/22 0210)   0.9 % sodium chloride bolus (0 mLs IntraVENous Stopped 3/26/22 0435)   iopamidol (ISOVUE-370) 76 % injection 90 mL (90 mLs IntraVENous Given 3/26/22 0421)   metoclopramide (REGLAN) injection 10 mg (10 mg IntraVENous Given 3/26/22 0521)   hydrALAZINE (APRESOLINE) injection 10 mg (10 mg IntraVENous Given 3/26/22 0518)             Medical Decision Making:    Patient presenting because of nausea vomiting and diarrhea. Patient reporting also having abdominal pain he reports no chest pain. There is history of substance abuse. Patient reportedly on methadone. Patient here is awake alert. Patient was in mild to moderate distress diffusely tender in his abdomen patient hypertensive. Patient was given Zofran prior to arrival.  Patient given IV fluids he was also given Zofran EKG did show QT prolongation the Zofran was given before EKG was done. Patient was given Tigan here for nausea. Patient still having nausea. He was medicated for his hypertension as well as for his pain. Patient magnesium note noted patient was ordered magnesium IV. Patient was rechecked several times still having abdominal pain nausea. Patient will be admitted. Re-Evaluations:             Re-evaluation. Patients symptoms show no change    Patient reevaluated several times still having symptoms. Patient reporting no chest pain or difficulty breathing.   Patient was given

## 2022-03-26 NOTE — H&P
Hospital Medicine History & Physical      PCP: No primary care provider on file. Date of Admission: 3/26/2022    Date of Service: 3/26/22  Chief Complaint:  N/V     History Of Present Illness:              Rachel Flores is a 78 y.o. complains of  nausea vomiting and diarrhea, beginning 2 days ago. Patient reporting abdominal pain no chest pain no difficulty breathing reports no syncopal event. Patient reporting no fever no chills. Patient reporting no leg pain. Patient reporting feeling weak. He reports no urinary symptoms. Past Medical History:          Diagnosis Date    Alcoholic ([address]     [facility]cong[facility])     Depression     Depression     Hep B w/ coma, chronic, w/ delta (HCC)     Hep C w/ coma, chronic     [facility]     Heroin abuse ([facility]     Sleep apnea, obstructive     [facility]     Suicide attempt [facility])        Past Surgical History:          Procedure Laterality Date    BRONCHOSCOPY  11/30/2015    With BAL and Biopsies    THORACOSCOPY Right 12/04/15    with lung biopsy       Medications Prior to Admission:      Prior to Admission medications    Medication Sig Start Date End Date Taking?  Authorizing Provider   predniSONE (DELTASONE) 10 MG tablet 40 mg daily x 3days,30 mg daily x 3 days, then 20mg daily x 3 days, 10 mg daily x 3days, then stop 7/14/17   Macie Green DO   ondansetron (ZOFRAN ODT) 4 MG disintegrating tablet Take 1 tablet by mouth every 8 hours as needed for Nausea or Vomiting  Patient taking differently: Take 4 mg by mouth every morning  4/21/17   Ro Cox MD   dicyclomine (BENTYL) 10 MG capsule Take 1 capsule by mouth 4 times daily (before meals and nightly) for 5 days 3/28/17 7/11/17  Nadia Lemos MD   lansoprazole (PREVACID) 30 MG delayed release capsule Take 1 capsule by mouth daily 3/28/17   Nadia Lemos MD   aspirin 81 MG chewable tablet Take 1 tablet by mouth daily 12/17/15   Wenceslao Aguilar DO metoprolol (TOPROL-XL) 25 MG XL tablet Take 1 tablet by mouth 2 times daily 12/17/15   [place] CALEB Aguilar, DO   amLODIPine (NORVASC) 5 MG tablet Take 1 tablet by mouth daily 12/17/15   [place] L Jeff, DO   gabapentin (NEURONTIN) 100 MG capsule Take 2 capsules by mouth 3 times daily  Patient taking differently: Take 600 mg by mouth 3 times daily  12/17/15   Wenceslao Aguilar DO   Arformoterol Tartrate (BROVANA) 15 MCG/2ML NEBU Take 2 mLs by nebulization 2 times daily 12/16/15   Hamilton Doan MD   budesonide (PULMICORT) 0.5 MG/2ML nebulizer suspension Take 2 mLs by nebulization 2 times daily 12/16/15   Hamilton Doan MD   ipratropium-albuterol (DUONEB) 0.5-2.5 (3) MG/3ML SOLN nebulizer solution Inhale 3 mLs into the lungs every 4 hours (while awake) 12/16/15   Hamilton Doan MD   ibuprofen (ADVIL;MOTRIN) 600 MG tablet Take 600 mg by mouth every 8 hours    Historical Provider, MD   methadone (DOLOPHINE) 10 MG tablet Take 100 mg by mouth daily 8 am    Historical Provider, MD   terazosin (HYTRIN) 1 MG capsule Take by mouth nightly Patient does not know dose    Historical Provider, MD   tadalafil (CIALIS) 10 MG tablet Take 10 mg by mouth as needed for Erectile Dysfunction. Historical Provider, MD       Allergies:  Patient has no known allergies. Social History:      The patient currently lives     TOBACCO:   reports that he has never smoked. He has never used smokeless tobacco.  ETOH:   reports no history of alcohol use. Family History:       Reviewed in detail and negative for DM, CAD, Cancer, CVA. Positive as follows:HTN        Problem Relation Age of Onset    Cancer Mother     Cancer Father     Cancer Sister        REVIEW OF SYSTEMS:   Pertinent positives as noted in the HPI. All other systems reviewed and negative.     PHYSICAL EXAM:    BP (!) 188/98   Pulse 115   Temp 97.2 °F (36.2 °C)   Resp 21   Ht 6' (1.829 m)   Wt 175 lb (79.4 kg)   SpO2 92%   BMI 23.73 kg/m²     General appearance:  No apparent distress, appears stated age and cooperative. HEENT:  Normal cephalic, atraumatic without obvious deformity. Pupils equal, round, and reactive to light. Extra ocular muscles intact. Conjunctivae/corneas clear. Neck: Supple, with full range of motion. No jugular venous distention. Trachea midline. Respiratory:  Normal respiratory effort. Clear to auscultation, bilaterally without Rales/Wheezes/Rhonchi. Cardiovascular:  Regular rate and rhythm with normal S1/S2 without murmurs, rubs or gallops. Abdomen: Soft, non-tender, non-distended with normal bowel sounds. Musculoskeletal:  No clubbing, cyanosis or edema bilaterally. Full range of motion without deformity. Skin: Skin color, texture, turgor normal.  No rashes or lesions. Neurologic:  Neurovascularly intact without any focal sensory/motor deficits. Cranial nerves: II-XII intact, grossly non-focal.  Psychiatric:  Alert and oriented, thought content appropriate, normal insight    CXR:  I have reviewed the CXR with the following interpretation:   EKG:  I have reviewed the EKG with the following interpretation:     Labs:     Recent Labs     03/26/22  0121   WBC 8.5   HGB 16.1   HCT 46.7        Recent Labs     03/26/22 0227      K 3.1*   CL 95*   CO2 24   BUN 33*   CREATININE 1.1   CALCIUM 9.9     Recent Labs     03/26/22 0227   AST 20   ALT 15   BILITOT 0.4   ALKPHOS 81     No results for input(s): INR in the last 72 hours. No results for input(s): Thersia Millers in the last 72 hours.     Urinalysis:      Lab Results   Component Value Date    NITRU Negative 03/26/2022    WBCUA NONE 03/26/2022    WBCUA 0-1 06/28/2011    BACTERIA MODERATE 03/26/2022    RBCUA 5-10 03/26/2022    RBCUA 1-3 06/28/2011    BLOODU LARGE 03/26/2022    SPECGRAV 1.025 03/26/2022    GLUCOSEU Negative 03/26/2022    GLUCOSEU NEGATIVE 06/28/2011         ASSESSMENT:    Active Hospital Problems    Diagnosis Date Noted    Intractable nausea and vomiting [R11.2] 03/26/2022   --Assessment  Intractable nausea and vomiting  Hypertensive urgency  Alcohol abuse  hep B  CHF  Lactic acidosis  History of heroin abuse, on methadone  Electrolyte imbalance    PLAN:    .CT abd pelvis :  No acute abdominopelvic abnormality.   Focus of intravesicular gas, likely related to recent instrumentation. Clinical correlation recommended.  Correlate with urinalysis to exclude  infection.   Thickening of the distal esophageal wall, which could suggest reflux  esophagitis.  Clinical correlation recommended. Start antiemetics, avoid Zofran as QTC is prolonged. IV PPI, IV fluids. CIWA protocol. Check with methadone and verify dosing with methadone clinic discussed with nursing staff    Replace electrolytes and recheck  Obtain cultures  DVT Prophylaxis:   Diet: No diet orders on file  Code Status: Prior    PT/OT Eval Status:     Janice Goldberg MD  Thank you No primary care provider on file. for the opportunity to be involved in this patient's care. If you have any questions or concerns please feel free to contact me through [facility].

## 2022-03-27 ENCOUNTER — APPOINTMENT (OUTPATIENT)
Dept: CT IMAGING | Age: 67
DRG: 392 | End: 2022-03-27
Payer: OTHER GOVERNMENT

## 2022-03-27 LAB
ALBUMIN SERPL-MCNC: 3.7 G/DL (ref 3.5–5.2)
ALP BLD-CCNC: 58 U/L (ref 40–129)
ALT SERPL-CCNC: 13 U/L (ref 0–40)
ANION GAP SERPL CALCULATED.3IONS-SCNC: 12 MMOL/L (ref 7–16)
AST SERPL-CCNC: 28 U/L (ref 0–39)
BASOPHILS ABSOLUTE: 0.02 E9/L (ref 0–0.2)
BASOPHILS RELATIVE PERCENT: 0.2 % (ref 0–2)
BILIRUB SERPL-MCNC: 0.4 MG/DL (ref 0–1.2)
BUN BLDV-MCNC: 30 MG/DL (ref 6–23)
CALCIUM SERPL-MCNC: 8.7 MG/DL (ref 8.6–10.2)
CHLORIDE BLD-SCNC: 100 MMOL/L (ref 98–107)
CO2: 25 MMOL/L (ref 22–29)
CREAT SERPL-MCNC: 0.8 MG/DL (ref 0.7–1.2)
EOSINOPHILS ABSOLUTE: 0.02 E9/L (ref 0.05–0.5)
EOSINOPHILS RELATIVE PERCENT: 0.2 % (ref 0–6)
GFR AFRICAN AMERICAN: >60
GFR NON-AFRICAN AMERICAN: >60 ML/MIN/1.73
GLUCOSE BLD-MCNC: 105 MG/DL (ref 74–99)
HCT VFR BLD CALC: 36.3 % (ref 37–54)
HEMOGLOBIN: 12 G/DL (ref 12.5–16.5)
IMMATURE GRANULOCYTES #: 0.05 E9/L
IMMATURE GRANULOCYTES %: 0.4 % (ref 0–5)
LYMPHOCYTES ABSOLUTE: 2.72 E9/L (ref 1.5–4)
LYMPHOCYTES RELATIVE PERCENT: 24 % (ref 20–42)
MAGNESIUM: 2.3 MG/DL (ref 1.6–2.6)
MCH RBC QN AUTO: 31.3 PG (ref 26–35)
MCHC RBC AUTO-ENTMCNC: 33.1 % (ref 32–34.5)
MCV RBC AUTO: 94.5 FL (ref 80–99.9)
MONOCYTES ABSOLUTE: 1.19 E9/L (ref 0.1–0.95)
MONOCYTES RELATIVE PERCENT: 10.5 % (ref 2–12)
NEUTROPHILS ABSOLUTE: 7.32 E9/L (ref 1.8–7.3)
NEUTROPHILS RELATIVE PERCENT: 64.7 % (ref 43–80)
PDW BLD-RTO: 14.4 FL (ref 11.5–15)
PLATELET # BLD: 218 E9/L (ref 130–450)
PMV BLD AUTO: 10.4 FL (ref 7–12)
POTASSIUM SERPL-SCNC: 3.1 MMOL/L (ref 3.5–5)
RBC # BLD: 3.84 E12/L (ref 3.8–5.8)
SODIUM BLD-SCNC: 137 MMOL/L (ref 132–146)
TOTAL PROTEIN: 7.1 G/DL (ref 6.4–8.3)
WBC # BLD: 11.3 E9/L (ref 4.5–11.5)

## 2022-03-27 PROCEDURE — 36415 COLL VENOUS BLD VENIPUNCTURE: CPT

## 2022-03-27 PROCEDURE — 85025 COMPLETE CBC W/AUTO DIFF WBC: CPT

## 2022-03-27 PROCEDURE — 94664 DEMO&/EVAL PT USE INHALER: CPT

## 2022-03-27 PROCEDURE — 6360000002 HC RX W HCPCS: Performed by: FAMILY MEDICINE

## 2022-03-27 PROCEDURE — A4216 STERILE WATER/SALINE, 10 ML: HCPCS | Performed by: FAMILY MEDICINE

## 2022-03-27 PROCEDURE — C9113 INJ PANTOPRAZOLE SODIUM, VIA: HCPCS | Performed by: FAMILY MEDICINE

## 2022-03-27 PROCEDURE — 2700000000 HC OXYGEN THERAPY PER DAY

## 2022-03-27 PROCEDURE — 70450 CT HEAD/BRAIN W/O DYE: CPT

## 2022-03-27 PROCEDURE — 1200000000 HC SEMI PRIVATE

## 2022-03-27 PROCEDURE — 6370000000 HC RX 637 (ALT 250 FOR IP): Performed by: FAMILY MEDICINE

## 2022-03-27 PROCEDURE — 6370000000 HC RX 637 (ALT 250 FOR IP): Performed by: INTERNAL MEDICINE

## 2022-03-27 PROCEDURE — 83735 ASSAY OF MAGNESIUM: CPT

## 2022-03-27 PROCEDURE — 2580000003 HC RX 258: Performed by: FAMILY MEDICINE

## 2022-03-27 PROCEDURE — 94640 AIRWAY INHALATION TREATMENT: CPT

## 2022-03-27 PROCEDURE — 80053 COMPREHEN METABOLIC PANEL: CPT

## 2022-03-27 RX ORDER — POTASSIUM CHLORIDE 20 MEQ/1
40 TABLET, EXTENDED RELEASE ORAL ONCE
Status: COMPLETED | OUTPATIENT
Start: 2022-03-27 | End: 2022-03-27

## 2022-03-27 RX ORDER — FAMOTIDINE 20 MG/1
20 TABLET, FILM COATED ORAL DAILY
Status: DISCONTINUED | OUTPATIENT
Start: 2022-03-27 | End: 2022-03-29

## 2022-03-27 RX ORDER — AMOXICILLIN AND CLAVULANATE POTASSIUM 875; 125 MG/1; MG/1
1 TABLET, FILM COATED ORAL EVERY 12 HOURS SCHEDULED
Status: DISCONTINUED | OUTPATIENT
Start: 2022-03-28 | End: 2022-03-30 | Stop reason: HOSPADM

## 2022-03-27 RX ORDER — SUCRALFATE 1 G/1
1 TABLET ORAL EVERY 6 HOURS SCHEDULED
Status: DISCONTINUED | OUTPATIENT
Start: 2022-03-27 | End: 2022-03-30 | Stop reason: HOSPADM

## 2022-03-27 RX ADMIN — METOPROLOL SUCCINATE 25 MG: 25 TABLET, EXTENDED RELEASE ORAL at 08:51

## 2022-03-27 RX ADMIN — SODIUM CHLORIDE 40 MG: 9 INJECTION INTRAMUSCULAR; INTRAVENOUS; SUBCUTANEOUS at 08:51

## 2022-03-27 RX ADMIN — ACETAMINOPHEN 650 MG: 325 TABLET ORAL at 08:51

## 2022-03-27 RX ADMIN — POTASSIUM CHLORIDE 40 MEQ: 20 TABLET, EXTENDED RELEASE ORAL at 09:42

## 2022-03-27 RX ADMIN — Medication 10 ML: at 08:53

## 2022-03-27 RX ADMIN — ACETAMINOPHEN 650 MG: 325 TABLET ORAL at 17:24

## 2022-03-27 RX ADMIN — METOPROLOL SUCCINATE 25 MG: 25 TABLET, EXTENDED RELEASE ORAL at 20:33

## 2022-03-27 RX ADMIN — AMLODIPINE BESYLATE 5 MG: 5 TABLET ORAL at 08:52

## 2022-03-27 RX ADMIN — FAMOTIDINE 20 MG: 20 TABLET ORAL at 17:25

## 2022-03-27 RX ADMIN — FOLIC ACID 1 MG: 1 TABLET ORAL at 08:51

## 2022-03-27 RX ADMIN — Medication 10 ML: at 20:40

## 2022-03-27 RX ADMIN — BUDESONIDE 500 MCG: 0.5 SUSPENSION RESPIRATORY (INHALATION) at 20:58

## 2022-03-27 RX ADMIN — ACETAMINOPHEN 650 MG: 325 TABLET ORAL at 23:11

## 2022-03-27 RX ADMIN — BUDESONIDE 500 MCG: 0.5 SUSPENSION RESPIRATORY (INHALATION) at 08:30

## 2022-03-27 RX ADMIN — Medication 1 TABLET: at 08:50

## 2022-03-27 RX ADMIN — DOXAZOSIN 1 MG: 1 TABLET ORAL at 08:50

## 2022-03-27 RX ADMIN — Medication 110 MG: at 09:43

## 2022-03-27 RX ADMIN — THIAMINE HCL TAB 100 MG 100 MG: 100 TAB at 08:51

## 2022-03-27 RX ADMIN — ARFORMOTEROL TARTRATE 15 MCG: 15 SOLUTION RESPIRATORY (INHALATION) at 08:30

## 2022-03-27 RX ADMIN — SODIUM CHLORIDE 40 MG: 9 INJECTION INTRAMUSCULAR; INTRAVENOUS; SUBCUTANEOUS at 20:33

## 2022-03-27 RX ADMIN — ENOXAPARIN SODIUM 40 MG: 100 INJECTION SUBCUTANEOUS at 08:52

## 2022-03-27 RX ADMIN — ARFORMOTEROL TARTRATE 15 MCG: 15 SOLUTION RESPIRATORY (INHALATION) at 20:58

## 2022-03-27 RX ADMIN — SODIUM CHLORIDE, PRESERVATIVE FREE 10 ML: 5 INJECTION INTRAVENOUS at 20:41

## 2022-03-27 RX ADMIN — SUCRALFATE 1 G: 1 TABLET ORAL at 17:25

## 2022-03-27 ASSESSMENT — PAIN DESCRIPTION - ONSET: ONSET: ON-GOING

## 2022-03-27 ASSESSMENT — PAIN DESCRIPTION - FREQUENCY: FREQUENCY: CONTINUOUS

## 2022-03-27 ASSESSMENT — PAIN DESCRIPTION - LOCATION
LOCATION: ABDOMEN
LOCATION: ABDOMEN
LOCATION: HEAD

## 2022-03-27 ASSESSMENT — PAIN SCALES - GENERAL
PAINLEVEL_OUTOF10: 10
PAINLEVEL_OUTOF10: 4
PAINLEVEL_OUTOF10: 5
PAINLEVEL_OUTOF10: 6
PAINLEVEL_OUTOF10: 8
PAINLEVEL_OUTOF10: 9
PAINLEVEL_OUTOF10: 8

## 2022-03-27 ASSESSMENT — PAIN SCALES - WONG BAKER
WONGBAKER_NUMERICALRESPONSE: 0
WONGBAKER_NUMERICALRESPONSE: 0

## 2022-03-27 ASSESSMENT — PAIN DESCRIPTION - PAIN TYPE
TYPE: ACUTE PAIN
TYPE: ACUTE PAIN

## 2022-03-27 ASSESSMENT — PAIN DESCRIPTION - DESCRIPTORS
DESCRIPTORS: ACHING;NAGGING;DISCOMFORT
DESCRIPTORS: SHARP;CRAMPING;ACHING
DESCRIPTORS: ACHING;CRAMPING;DISCOMFORT

## 2022-03-27 ASSESSMENT — PAIN DESCRIPTION - ORIENTATION: ORIENTATION: LOWER;MID

## 2022-03-27 NOTE — PROGRESS NOTES
Message to Dr Osorio Sauceda regarding order for telemetry monitoring related to ability to utilize telemetry monitoring on current floor.

## 2022-03-27 NOTE — PLAN OF CARE
Problem: Falls - Risk of:  Goal: Will remain free from falls  Description: Will remain free from falls  Outcome: Met This Shift     Problem: Falls - Risk of:  Goal: Absence of physical injury  Description: Absence of physical injury  Outcome: Met This Shift     Problem: Pain:  Goal: Pain level will decrease  Description: Pain level will decrease  Outcome: Ongoing     Problem: Pain:  Goal: Control of acute pain  Description: Control of acute pain  Outcome: Ongoing

## 2022-03-27 NOTE — PROGRESS NOTES
Hospitalist Progress Note      SYNOPSIS: Patient admitted on 3/26/2022 for       SUBJECTIVE:    Patient seen and examined. He states that he has burning abd pain; +nauseous. No vomiting. +headache. Stable overnight. No other overnight issues reported. Temp (24hrs), Av.6 °F (36.4 °C), Min:96.8 °F (36 °C), Max:98.5 °F (36.9 °C)    DIET: ADULT DIET; Clear Liquid  CODE: Full Code    Intake/Output Summary (Last 24 hours) at 3/27/2022 1621  Last data filed at 3/27/2022 1129  Gross per 24 hour   Intake 240 ml   Output 100 ml   Net 140 ml       OBJECTIVE:    BP (!) 107/56   Pulse 50   Temp 96.8 °F (36 °C) (Infrared)   Resp 17   Ht 6' (1.829 m)   Wt 175 lb (79.4 kg)   SpO2 96%   BMI 23.73 kg/m²     General appearance: No apparent distress, appears stated age and cooperative. HEENT:  Conjunctivae/corneas clear. Neck: Supple. No jugular venous distention. Respiratory: Clear to auscultation bilaterally, normal respiratory effort  Cardiovascular: Regular rate rhythm, normal S1-S2  Abdomen: Soft, TP in the R. Liver, epigastric pain, LUQ pain. Musculoskeletal: No clubbing, cyanosis, no bilateral lower extremity edema. Brisk capillary refill. Skin:  No rashes  on visible skin  Neurologic: awake, alert and following commands     ASSESSMENT:  66 y.o. H/o ETOH, CHF, depression, Hep B/Hep C with coma, heroin abuse, sleep apnea, H/o SI complains of nausea vomiting and diarrhea, beginning 2 days ago. PLAN:  1.) Abd pain: CT abd pelvis :No acute abdominopelvic abnormality. Focus of intravesicular gas, likely related to recent instrumentation. Clinical correlation recommended.  Correlate with urinalysis to exclude infection. Thickening of the distal esophageal wall, which could suggest reflux esophagitis.  Clinical correlation recommended. Urine cx is pending. Clear liquid. Patient on protonix BID; carafate started; famotidine daily started. Will nee GI F/u.     LFTS/Lipase are wnl.   2.) Headache: CT head pending.   3.) HTN urgency: Controlled. 4.) ETOH abuse: May be exacerbating cause for #1; methadone/thiamine/MVI   5.) Hep B/Hep C   6.) CHF   7.) Lactic acidosis: resolved  8.) Hypokalemia: supplemented.    9.) DVT proph: lovenox       DISPOSITION:    Medications:  REVIEWED DAILY      Infusion Medications    sodium chloride      sodium chloride       Scheduled Medications    famotidine  20 mg Oral Daily    sucralfate  1 g Oral 4 times per day    pantoprazole (PROTONIX) 40 mg injection  40 mg IntraVENous BID    amLODIPine  5 mg Oral Daily    Arformoterol Tartrate  15 mcg Nebulization BID    budesonide  500 mcg Nebulization BID    metoprolol succinate  25 mg Oral BID    doxazosin  1 mg Oral Daily    sodium chloride flush  5-40 mL IntraVENous 2 times per day    enoxaparin  40 mg SubCUTAneous Daily    multivitamin  1 tablet Oral Daily    thiamine mononitrate  100 mg Oral Daily    folic acid  1 mg Oral Daily    sodium chloride flush  5-40 mL IntraVENous 2 times per day    methadone  110 mg Oral Daily     PRN Meds: sodium chloride flush, calcium carbonate, aluminum & magnesium hydroxide-simethicone, trimethobenzamide, ipratropium-albuterol, sodium chloride flush, sodium chloride, polyethylene glycol, acetaminophen **OR** acetaminophen, hydrALAZINE, sodium chloride flush, sodium chloride, LORazepam **OR** LORazepam **OR** LORazepam **OR** LORazepam **OR** LORazepam **OR** LORazepam **OR** LORazepam **OR** LORazepam        Labs:     Recent Labs     03/26/22 0121 03/27/22 0431   WBC 8.5 11.3   HGB 16.1 12.0*   HCT 46.7 36.3*    218       Recent Labs     03/26/22 0227 03/27/22 0431    137   K 3.1* 3.1*   CL 95* 100   CO2 24 25   BUN 33* 30*   CREATININE 1.1 0.8   CALCIUM 9.9 8.7       Recent Labs     03/26/22 0121 03/26/22 0227 03/27/22 0431   PROT  --  9.0* 7.1   ALKPHOS  --  81 58   ALT  --  15 13   AST  --  20 28   BILITOT  --  0.4 0.4   LIPASE 17  --   --        No results for input(s): INR in the last 72 hours. No results for input(s): Jackelyn Galaviz in the last 72 hours. Chronic labs:    Lab Results   Component Value Date    TSH 1.120 11/26/2015    INR 0.9 12/10/2015    LABA1C 5.5 11/26/2015       Radiology: REVIEWED DAILY    +++++++++++++++++++++++++++++++++++++++++++++++++  Yolanda Washington MD  [facility] [address]  +++++++++++++++++++++++++++++++++++++++++++++++++  NOTE: This report was transcribed using voice recognition software. Every effort was made to ensure accuracy; however, inadvertent computerized transcription errors may be present.

## 2022-03-27 NOTE — PROGRESS NOTES
Hospitalist Progress Note      SYNOPSIS: Patient admitted on 3/26/2022 for       SUBJECTIVE:    Patient seen and examined. He states that he has burning abd pain; +nauseous. No vomiting. +headache. Stable overnight. No other overnight issues reported. Temp (24hrs), Av.6 °F (36.4 °C), Min:96.8 °F (36 °C), Max:98.5 °F (36.9 °C)    DIET: ADULT DIET; Clear Liquid  CODE: Full Code    Intake/Output Summary (Last 24 hours) at 3/27/2022 1632  Last data filed at 3/27/2022 1129  Gross per 24 hour   Intake 240 ml   Output 100 ml   Net 140 ml       OBJECTIVE:    BP (!) 107/56   Pulse 50   Temp 96.8 °F (36 °C) (Infrared)   Resp 17   Ht 6' (1.829 m)   Wt 175 lb (79.4 kg)   SpO2 96%   BMI 23.73 kg/m²     General appearance: No apparent distress, appears stated age and cooperative. HEENT:  Conjunctivae/corneas clear. Neck: Supple. No jugular venous distention. Respiratory: Clear to auscultation bilaterally, normal respiratory effort  Cardiovascular: Regular rate rhythm, normal S1-S2  Abdomen: Soft, TP in the R. Liver, epigastric pain, LUQ pain. Musculoskeletal: No clubbing, cyanosis, no bilateral lower extremity edema. Brisk capillary refill. Skin:  No rashes  on visible skin  Neurologic: awake, alert and following commands     ASSESSMENT:  66 y.o. H/o ETOH, CHF, depression, Hep B/Hep C with coma, heroin abuse, sleep apnea, H/o SI complains of nausea vomiting and diarrhea, beginning 2 days ago. PLAN:  1.) Abd pain: CT abd pelvis :No acute abdominopelvic abnormality. Focus of intravesicular gas, likely related to recent instrumentation. Clinical correlation recommended.  Correlate with urinalysis to exclude infection. Thickening of the distal esophageal wall, which could suggest reflux esophagitis.  Clinical correlation recommended. Urine cx is pending. Clear liquid- patient wants to try try full liquids. Patient on protonix BID; carafate started; famotidine daily started.  Will need GI F/u- possible OP for EGD. LFTS/Lipase are wnl.   2.) Headache: CT head:   Impression   No acute intracranial abnormality or hemorrhage.       Periventricular leukomalacia.       Right maxillary sinusitis. Will start on Augmentin to finish a 10 day course. 3.) HTN urgency: Controlled. 4.) ETOH abuse: May be exacerbating cause for #1; methadone/thiamine/MVI. CIWA scoring.   5.) Hep B/Hep C   6.) CHF   7.) Lactic acidosis: resolved  8.) Hypokalemia: supplemented.    9.) DVT proph: lovenox       DISPOSITION: TBD    Medications:  REVIEWED DAILY      Infusion Medications    sodium chloride      sodium chloride       Scheduled Medications    famotidine  20 mg Oral Daily    sucralfate  1 g Oral 4 times per day    pantoprazole (PROTONIX) 40 mg injection  40 mg IntraVENous BID    amLODIPine  5 mg Oral Daily    Arformoterol Tartrate  15 mcg Nebulization BID    budesonide  500 mcg Nebulization BID    metoprolol succinate  25 mg Oral BID    doxazosin  1 mg Oral Daily    sodium chloride flush  5-40 mL IntraVENous 2 times per day    enoxaparin  40 mg SubCUTAneous Daily    multivitamin  1 tablet Oral Daily    thiamine mononitrate  100 mg Oral Daily    folic acid  1 mg Oral Daily    sodium chloride flush  5-40 mL IntraVENous 2 times per day    methadone  110 mg Oral Daily     PRN Meds: sodium chloride flush, calcium carbonate, aluminum & magnesium hydroxide-simethicone, trimethobenzamide, ipratropium-albuterol, sodium chloride flush, sodium chloride, polyethylene glycol, acetaminophen **OR** acetaminophen, hydrALAZINE, sodium chloride flush, sodium chloride, LORazepam **OR** LORazepam **OR** LORazepam **OR** LORazepam **OR** LORazepam **OR** LORazepam **OR** LORazepam **OR** LORazepam        Labs:     Recent Labs     03/26/22  0121 03/27/22  0431   WBC 8.5 11.3   HGB 16.1 12.0*   HCT 46.7 36.3*    218       Recent Labs     03/26/22  0227 03/27/22  0431    137   K 3.1* 3.1*   CL 95* 100   CO2 24 25 BUN 33* 30*   CREATININE 1.1 0.8   CALCIUM 9.9 8.7       Recent Labs     03/26/22  0121 03/26/22  0227 03/27/22  0431   PROT  --  9.0* 7.1   ALKPHOS  --  81 58   ALT  --  15 13   AST  --  20 28   BILITOT  --  0.4 0.4   LIPASE 17  --   --        No results for input(s): INR in the last 72 hours. No results for input(s): Evalizeie Montane in the last 72 hours. Chronic labs:    Lab Results   Component Value Date    TSH 1.120 11/26/2015    INR 0.9 12/10/2015    LABA1C 5.5 11/26/2015       Radiology: REVIEWED DAILY    +++++++++++++++++++++++++++++++++++++++++++++++++  Devyn Ruth MD  [facility] Physician - [address] [place] [address]  +++++++++++++++++++++++++++++++++++++++++++++++++  NOTE: This report was transcribed using voice recognition software. Every effort was made to ensure accuracy; however, inadvertent computerized transcription errors may be present.

## 2022-03-27 NOTE — PLAN OF CARE
Problem: Nausea/Vomiting:  Goal: Absence of nausea/vomiting  Description: Absence of nausea/vomiting  Outcome: Met This Shift     Problem: Nausea/Vomiting:  Goal: Able to drink  Description: Able to drink  Outcome: Met This Shift     Problem: Nausea/Vomiting:  Goal: Ability to achieve adequate nutritional intake will improve  Description: Ability to achieve adequate nutritional intake will improve  Outcome: Met This Shift

## 2022-03-28 LAB
ALBUMIN SERPL-MCNC: 3.6 G/DL (ref 3.5–5.2)
ALP BLD-CCNC: 67 U/L (ref 40–129)
ALT SERPL-CCNC: 18 U/L (ref 0–40)
ANION GAP SERPL CALCULATED.3IONS-SCNC: 8 MMOL/L (ref 7–16)
AST SERPL-CCNC: 33 U/L (ref 0–39)
BASOPHILS ABSOLUTE: 0.03 E9/L (ref 0–0.2)
BASOPHILS RELATIVE PERCENT: 0.4 % (ref 0–2)
BILIRUB SERPL-MCNC: 0.3 MG/DL (ref 0–1.2)
BUN BLDV-MCNC: 16 MG/DL (ref 6–23)
CALCIUM SERPL-MCNC: 9 MG/DL (ref 8.6–10.2)
CHLORIDE BLD-SCNC: 98 MMOL/L (ref 98–107)
CO2: 30 MMOL/L (ref 22–29)
CREAT SERPL-MCNC: 0.8 MG/DL (ref 0.7–1.2)
EKG ATRIAL RATE: 89 BPM
EKG P AXIS: 61 DEGREES
EKG P-R INTERVAL: 130 MS
EKG Q-T INTERVAL: 484 MS
EKG QRS DURATION: 92 MS
EKG QTC CALCULATION (BAZETT): 588 MS
EKG R AXIS: 65 DEGREES
EKG T AXIS: 65 DEGREES
EKG VENTRICULAR RATE: 89 BPM
EOSINOPHILS ABSOLUTE: 0.12 E9/L (ref 0.05–0.5)
EOSINOPHILS RELATIVE PERCENT: 1.6 % (ref 0–6)
GFR AFRICAN AMERICAN: >60
GFR NON-AFRICAN AMERICAN: >60 ML/MIN/1.73
GLUCOSE BLD-MCNC: 91 MG/DL (ref 74–99)
HCT VFR BLD CALC: 37.5 % (ref 37–54)
HEMOGLOBIN: 12.4 G/DL (ref 12.5–16.5)
IMMATURE GRANULOCYTES #: 0.01 E9/L
IMMATURE GRANULOCYTES %: 0.1 % (ref 0–5)
LYMPHOCYTES ABSOLUTE: 2.77 E9/L (ref 1.5–4)
LYMPHOCYTES RELATIVE PERCENT: 36.2 % (ref 20–42)
MAGNESIUM: 2.4 MG/DL (ref 1.6–2.6)
MCH RBC QN AUTO: 31.2 PG (ref 26–35)
MCHC RBC AUTO-ENTMCNC: 33.1 % (ref 32–34.5)
MCV RBC AUTO: 94.2 FL (ref 80–99.9)
MONOCYTES ABSOLUTE: 0.83 E9/L (ref 0.1–0.95)
MONOCYTES RELATIVE PERCENT: 10.8 % (ref 2–12)
NEUTROPHILS ABSOLUTE: 3.89 E9/L (ref 1.8–7.3)
NEUTROPHILS RELATIVE PERCENT: 50.9 % (ref 43–80)
PDW BLD-RTO: 13.9 FL (ref 11.5–15)
PHOSPHORUS: 2.7 MG/DL (ref 2.5–4.5)
PLATELET # BLD: 197 E9/L (ref 130–450)
PMV BLD AUTO: 10.4 FL (ref 7–12)
POTASSIUM SERPL-SCNC: 3.7 MMOL/L (ref 3.5–5)
RBC # BLD: 3.98 E12/L (ref 3.8–5.8)
SODIUM BLD-SCNC: 136 MMOL/L (ref 132–146)
TOTAL PROTEIN: 7 G/DL (ref 6.4–8.3)
WBC # BLD: 7.7 E9/L (ref 4.5–11.5)

## 2022-03-28 PROCEDURE — 6370000000 HC RX 637 (ALT 250 FOR IP): Performed by: INTERNAL MEDICINE

## 2022-03-28 PROCEDURE — 94640 AIRWAY INHALATION TREATMENT: CPT

## 2022-03-28 PROCEDURE — 6370000000 HC RX 637 (ALT 250 FOR IP): Performed by: FAMILY MEDICINE

## 2022-03-28 PROCEDURE — A4216 STERILE WATER/SALINE, 10 ML: HCPCS | Performed by: FAMILY MEDICINE

## 2022-03-28 PROCEDURE — 2580000003 HC RX 258: Performed by: FAMILY MEDICINE

## 2022-03-28 PROCEDURE — 93010 ELECTROCARDIOGRAM REPORT: CPT | Performed by: INTERNAL MEDICINE

## 2022-03-28 PROCEDURE — 83735 ASSAY OF MAGNESIUM: CPT

## 2022-03-28 PROCEDURE — 80053 COMPREHEN METABOLIC PANEL: CPT

## 2022-03-28 PROCEDURE — 1200000000 HC SEMI PRIVATE

## 2022-03-28 PROCEDURE — 85025 COMPLETE CBC W/AUTO DIFF WBC: CPT

## 2022-03-28 PROCEDURE — 2700000000 HC OXYGEN THERAPY PER DAY

## 2022-03-28 PROCEDURE — 84100 ASSAY OF PHOSPHORUS: CPT

## 2022-03-28 PROCEDURE — 36415 COLL VENOUS BLD VENIPUNCTURE: CPT

## 2022-03-28 PROCEDURE — 6360000002 HC RX W HCPCS: Performed by: FAMILY MEDICINE

## 2022-03-28 PROCEDURE — C9113 INJ PANTOPRAZOLE SODIUM, VIA: HCPCS | Performed by: FAMILY MEDICINE

## 2022-03-28 RX ADMIN — BUDESONIDE 500 MCG: 0.5 SUSPENSION RESPIRATORY (INHALATION) at 09:12

## 2022-03-28 RX ADMIN — Medication 110 MG: at 10:46

## 2022-03-28 RX ADMIN — BUDESONIDE 500 MCG: 0.5 SUSPENSION RESPIRATORY (INHALATION) at 19:40

## 2022-03-28 RX ADMIN — AMLODIPINE BESYLATE 5 MG: 5 TABLET ORAL at 08:58

## 2022-03-28 RX ADMIN — ARFORMOTEROL TARTRATE 15 MCG: 15 SOLUTION RESPIRATORY (INHALATION) at 09:12

## 2022-03-28 RX ADMIN — SODIUM CHLORIDE 40 MG: 9 INJECTION INTRAMUSCULAR; INTRAVENOUS; SUBCUTANEOUS at 08:58

## 2022-03-28 RX ADMIN — AMOXICILLIN AND CLAVULANATE POTASSIUM 1 TABLET: 875; 125 TABLET, FILM COATED ORAL at 08:58

## 2022-03-28 RX ADMIN — SODIUM CHLORIDE 40 MG: 9 INJECTION INTRAMUSCULAR; INTRAVENOUS; SUBCUTANEOUS at 21:11

## 2022-03-28 RX ADMIN — Medication 10 ML: at 21:12

## 2022-03-28 RX ADMIN — THIAMINE HCL TAB 100 MG 100 MG: 100 TAB at 10:47

## 2022-03-28 RX ADMIN — IPRATROPIUM BROMIDE AND ALBUTEROL SULFATE 3 ML: .5; 2.5 SOLUTION RESPIRATORY (INHALATION) at 09:13

## 2022-03-28 RX ADMIN — DOXAZOSIN 1 MG: 1 TABLET ORAL at 08:59

## 2022-03-28 RX ADMIN — ARFORMOTEROL TARTRATE 15 MCG: 15 SOLUTION RESPIRATORY (INHALATION) at 19:40

## 2022-03-28 RX ADMIN — FAMOTIDINE 20 MG: 20 TABLET ORAL at 08:58

## 2022-03-28 RX ADMIN — Medication 1 TABLET: at 08:59

## 2022-03-28 RX ADMIN — SUCRALFATE 1 G: 1 TABLET ORAL at 05:35

## 2022-03-28 RX ADMIN — METOPROLOL SUCCINATE 25 MG: 25 TABLET, EXTENDED RELEASE ORAL at 08:58

## 2022-03-28 RX ADMIN — FOLIC ACID 1 MG: 1 TABLET ORAL at 08:58

## 2022-03-28 RX ADMIN — ENOXAPARIN SODIUM 40 MG: 100 INJECTION SUBCUTANEOUS at 08:58

## 2022-03-28 RX ADMIN — AMOXICILLIN AND CLAVULANATE POTASSIUM 1 TABLET: 875; 125 TABLET, FILM COATED ORAL at 21:11

## 2022-03-28 RX ADMIN — METOPROLOL SUCCINATE 25 MG: 25 TABLET, EXTENDED RELEASE ORAL at 21:11

## 2022-03-28 RX ADMIN — SUCRALFATE 1 G: 1 TABLET ORAL at 12:00

## 2022-03-28 RX ADMIN — SUCRALFATE 1 G: 1 TABLET ORAL at 00:10

## 2022-03-28 RX ADMIN — SUCRALFATE 1 G: 1 TABLET ORAL at 18:22

## 2022-03-28 ASSESSMENT — PAIN SCALES - GENERAL: PAINLEVEL_OUTOF10: 4

## 2022-03-28 NOTE — PROGRESS NOTES
Hospitalist Progress Note      SYNOPSIS: Patient admitted on 3/26/2022 for       SUBJECTIVE:    Patient seen and examined. He states that he feels better. He was able to tolerate his food and have no difficulty with eating; no further nausea or vomiting. Headache is improved as well. Stable overnight. No other overnight issues reported. Temp (24hrs), Av.5 °F (36.4 °C), Min:96.8 °F (36 °C), Max:98 °F (36.7 °C)    DIET: ADULT DIET; Easy to Chew; Low Sodium (2 gm); GI [place] (GERD/Peptic Ulcer)  CODE: Full Code    Intake/Output Summary (Last 24 hours) at 3/28/2022 1405  Last data filed at 3/28/2022 0402  Gross per 24 hour   Intake 780 ml   Output 900 ml   Net -120 ml       OBJECTIVE:    BP (!) 168/86   Pulse (!) 48   Temp 98 °F (36.7 °C) (Oral)   Resp 16   Ht 6' (1.829 m)   Wt 175 lb (79.4 kg)   SpO2 95%   BMI 23.73 kg/m²     General appearance: No apparent distress, appears stated age and cooperative. HEENT:  Conjunctivae/corneas clear. Neck: Supple. No jugular venous distention. Respiratory: Clear to auscultation bilaterally, normal respiratory effort  Cardiovascular: Regular rate rhythm, normal S1-S2  Abdomen: Soft, NT/ND +BS. Musculoskeletal: No clubbing, cyanosis, no bilateral lower extremity edema. Brisk capillary refill. Skin:  No rashes  on visible skin  Neurologic: awake, alert and following commands     ASSESSMENT:  66 y.o. H/o ETOH, CHF, depression, Hep B/Hep C with coma, heroin abuse, sleep apnea, H/o SI complains of nausea vomiting and diarrhea, beginning 2 days ago. PLAN:  1.) Abd pain: CT abd pelvis :No acute abdominopelvic abnormality. Focus of intravesicular gas, likely related to recent instrumentation. Clinical correlation recommended.  Correlate with urinalysis to exclude infection. Thickening of the distal esophageal wall, which could suggest reflux esophagitis.  Clinical correlation recommended. Urine cx: No growth present. Soft diet.    Patient on protonix BID; carafate started; famotidine daily started. Will need GI F/u- possible OP for EGD. Patient's symptoms improved- can f/u OP for an EGD. LFTS/Lipase are wnl.   2.) Headache: CT head:   Impression   No acute intracranial abnormality or hemorrhage.       Periventricular leukomalacia.       Right maxillary sinusitis. Will start on Augmentin to finish a 10 day course. 3.) HTN urgency: C/w metoprolol and norvasc. 4.) ETOH abuse: May be exacerbating cause for #1; methadone/thiamine/MVI. CIWA scoring. Patient denies drinking ETOH. 5.) Hep B/Hep C   6.) CHF   7.) Lactic acidosis: resolved  8.) Hypokalemia: Resolved  9.) DVT proph: lovenox       Once patient tolerates diet- soft and BP remains controlled then D/c home with f/u PCP   And Referral to Gastroenterology. DISPOSITION: Home when medically stable.     Medications:  REVIEWED DAILY      Infusion Medications    sodium chloride      sodium chloride       Scheduled Medications    famotidine  20 mg Oral Daily    sucralfate  1 g Oral 4 times per day    amoxicillin-clavulanate  1 tablet Oral 2 times per day    pantoprazole (PROTONIX) 40 mg injection  40 mg IntraVENous BID    amLODIPine  5 mg Oral Daily    Arformoterol Tartrate  15 mcg Nebulization BID    budesonide  500 mcg Nebulization BID    metoprolol succinate  25 mg Oral BID    doxazosin  1 mg Oral Daily    sodium chloride flush  5-40 mL IntraVENous 2 times per day    enoxaparin  40 mg SubCUTAneous Daily    multivitamin  1 tablet Oral Daily    thiamine mononitrate  100 mg Oral Daily    folic acid  1 mg Oral Daily    sodium chloride flush  5-40 mL IntraVENous 2 times per day    methadone  110 mg Oral Daily     PRN Meds: sodium chloride flush, calcium carbonate, aluminum & magnesium hydroxide-simethicone, trimethobenzamide, ipratropium-albuterol, sodium chloride flush, sodium chloride, polyethylene glycol, acetaminophen **OR** acetaminophen, hydrALAZINE, sodium chloride flush, sodium chloride, LORazepam **OR** LORazepam **OR** LORazepam **OR** LORazepam **OR** LORazepam **OR** LORazepam **OR** LORazepam **OR** LORazepam        Labs:     Recent Labs     03/26/22 0121 03/27/22 0431 03/28/22  0441   WBC 8.5 11.3 7.7   HGB 16.1 12.0* 12.4*   HCT 46.7 36.3* 37.5    218 197       Recent Labs     03/26/22 0227 03/27/22 0431 03/28/22  0440    137 136   K 3.1* 3.1* 3.7   CL 95* 100 98   CO2 24 25 30*   BUN 33* 30* 16   CREATININE 1.1 0.8 0.8   CALCIUM 9.9 8.7 9.0   PHOS  --   --  2.7       Recent Labs     03/26/22 0121 03/26/22 0227 03/27/22 0431 03/28/22  0440   PROT  --  9.0* 7.1 7.0   ALKPHOS  --  81 58 67   ALT  --  15 13 18   AST  --  20 28 33   BILITOT  --  0.4 0.4 0.3   LIPASE 17  --   --   --        No results for input(s): INR in the last 72 hours. No results for input(s): [place] in the last 72 hours. Chronic labs:    Lab Results   Component Value Date    TSH 1.120 11/26/2015    INR 0.9 12/10/2015    LABA1C 5.5 11/26/2015       Radiology: REVIEWED DAILY    +++++++++++++++++++++++++++++++++++++++++++++++++  Madison Cardoso MD  [facility] - [address] [place] [address], [place]  +++++++++++++++++++++++++++++++++++++++++++++++++  NOTE: This report was transcribed using voice recognition software. Every effort was made to ensure accuracy; however, inadvertent computerized transcription errors may be present.

## 2022-03-28 NOTE — CARE COORDINATION
Met with the pt at the bedside. He lives with his significant other and will return home when medically stable. He states he has been trying to reach his SO and has been unable. He hopes to have transportation home. Explained that we can call a cab at his expense.  Florin Martines RN

## 2022-03-29 LAB
ALBUMIN SERPL-MCNC: 3.7 G/DL (ref 3.5–5.2)
ALP BLD-CCNC: 82 U/L (ref 40–129)
ALT SERPL-CCNC: 26 U/L (ref 0–40)
ANION GAP SERPL CALCULATED.3IONS-SCNC: 11 MMOL/L (ref 7–16)
ANISOCYTOSIS: ABNORMAL
AST SERPL-CCNC: 32 U/L (ref 0–39)
ATYPICAL LYMPHOCYTE RELATIVE PERCENT: 4.4 % (ref 0–4)
BASOPHILS ABSOLUTE: 0.06 E9/L (ref 0–0.2)
BASOPHILS RELATIVE PERCENT: 0.9 % (ref 0–2)
BILIRUB SERPL-MCNC: 0.2 MG/DL (ref 0–1.2)
BUN BLDV-MCNC: 10 MG/DL (ref 6–23)
CALCIUM SERPL-MCNC: 9 MG/DL (ref 8.6–10.2)
CHLORIDE BLD-SCNC: 99 MMOL/L (ref 98–107)
CO2: 26 MMOL/L (ref 22–29)
CREAT SERPL-MCNC: 0.8 MG/DL (ref 0.7–1.2)
EOSINOPHILS ABSOLUTE: 0.12 E9/L (ref 0.05–0.5)
EOSINOPHILS RELATIVE PERCENT: 1.7 % (ref 0–6)
GFR AFRICAN AMERICAN: >60
GFR NON-AFRICAN AMERICAN: >60 ML/MIN/1.73
GLUCOSE BLD-MCNC: 85 MG/DL (ref 74–99)
HCT VFR BLD CALC: 37.4 % (ref 37–54)
HEMOGLOBIN: 12.2 G/DL (ref 12.5–16.5)
HYPOCHROMIA: ABNORMAL
LYMPHOCYTES ABSOLUTE: 2.63 E9/L (ref 1.5–4)
LYMPHOCYTES RELATIVE PERCENT: 33 % (ref 20–42)
MAGNESIUM: 2.4 MG/DL (ref 1.6–2.6)
MCH RBC QN AUTO: 31 PG (ref 26–35)
MCHC RBC AUTO-ENTMCNC: 32.6 % (ref 32–34.5)
MCV RBC AUTO: 94.9 FL (ref 80–99.9)
MONOCYTES ABSOLUTE: 0.78 E9/L (ref 0.1–0.95)
MONOCYTES RELATIVE PERCENT: 11.3 % (ref 2–12)
NEUTROPHILS ABSOLUTE: 3.48 E9/L (ref 1.8–7.3)
NEUTROPHILS RELATIVE PERCENT: 48.7 % (ref 43–80)
OVALOCYTES: ABNORMAL
PDW BLD-RTO: 13.6 FL (ref 11.5–15)
PHOSPHORUS: 2.6 MG/DL (ref 2.5–4.5)
PLATELET # BLD: 191 E9/L (ref 130–450)
PMV BLD AUTO: 10.7 FL (ref 7–12)
POIKILOCYTES: ABNORMAL
POTASSIUM SERPL-SCNC: 3.8 MMOL/L (ref 3.5–5)
RBC # BLD: 3.94 E12/L (ref 3.8–5.8)
SODIUM BLD-SCNC: 136 MMOL/L (ref 132–146)
STOMATOCYTES: ABNORMAL
TOTAL PROTEIN: 7.2 G/DL (ref 6.4–8.3)
TSH SERPL DL<=0.05 MIU/L-ACNC: 3.38 UIU/ML (ref 0.27–4.2)
URINE CULTURE, ROUTINE: NORMAL
WBC # BLD: 7.1 E9/L (ref 4.5–11.5)

## 2022-03-29 PROCEDURE — C9113 INJ PANTOPRAZOLE SODIUM, VIA: HCPCS | Performed by: FAMILY MEDICINE

## 2022-03-29 PROCEDURE — 6370000000 HC RX 637 (ALT 250 FOR IP): Performed by: INTERNAL MEDICINE

## 2022-03-29 PROCEDURE — 84443 ASSAY THYROID STIM HORMONE: CPT

## 2022-03-29 PROCEDURE — 85025 COMPLETE CBC W/AUTO DIFF WBC: CPT

## 2022-03-29 PROCEDURE — 6360000002 HC RX W HCPCS: Performed by: FAMILY MEDICINE

## 2022-03-29 PROCEDURE — 36415 COLL VENOUS BLD VENIPUNCTURE: CPT

## 2022-03-29 PROCEDURE — A4216 STERILE WATER/SALINE, 10 ML: HCPCS | Performed by: FAMILY MEDICINE

## 2022-03-29 PROCEDURE — 84100 ASSAY OF PHOSPHORUS: CPT

## 2022-03-29 PROCEDURE — 80053 COMPREHEN METABOLIC PANEL: CPT

## 2022-03-29 PROCEDURE — 6370000000 HC RX 637 (ALT 250 FOR IP): Performed by: FAMILY MEDICINE

## 2022-03-29 PROCEDURE — 1200000000 HC SEMI PRIVATE

## 2022-03-29 PROCEDURE — 2580000003 HC RX 258: Performed by: FAMILY MEDICINE

## 2022-03-29 PROCEDURE — 83735 ASSAY OF MAGNESIUM: CPT

## 2022-03-29 PROCEDURE — 94640 AIRWAY INHALATION TREATMENT: CPT

## 2022-03-29 RX ORDER — LISINOPRIL 5 MG/1
5 TABLET ORAL DAILY
Status: DISCONTINUED | OUTPATIENT
Start: 2022-03-30 | End: 2022-03-29

## 2022-03-29 RX ORDER — METOPROLOL SUCCINATE 25 MG/1
12.5 TABLET, EXTENDED RELEASE ORAL 2 TIMES DAILY
Status: DISCONTINUED | OUTPATIENT
Start: 2022-03-29 | End: 2022-03-30 | Stop reason: HOSPADM

## 2022-03-29 RX ADMIN — SODIUM CHLORIDE 40 MG: 9 INJECTION INTRAMUSCULAR; INTRAVENOUS; SUBCUTANEOUS at 09:10

## 2022-03-29 RX ADMIN — BUDESONIDE 500 MCG: 0.5 SUSPENSION RESPIRATORY (INHALATION) at 08:43

## 2022-03-29 RX ADMIN — AMOXICILLIN AND CLAVULANATE POTASSIUM 1 TABLET: 875; 125 TABLET, FILM COATED ORAL at 21:07

## 2022-03-29 RX ADMIN — ACETAMINOPHEN 650 MG: 325 TABLET ORAL at 20:19

## 2022-03-29 RX ADMIN — THIAMINE HCL TAB 100 MG 100 MG: 100 TAB at 09:09

## 2022-03-29 RX ADMIN — FOLIC ACID 1 MG: 1 TABLET ORAL at 09:09

## 2022-03-29 RX ADMIN — Medication 110 MG: at 09:08

## 2022-03-29 RX ADMIN — METOPROLOL SUCCINATE 12.5 MG: 25 TABLET, EXTENDED RELEASE ORAL at 21:07

## 2022-03-29 RX ADMIN — AMLODIPINE BESYLATE 5 MG: 5 TABLET ORAL at 09:09

## 2022-03-29 RX ADMIN — SUCRALFATE 1 G: 1 TABLET ORAL at 12:22

## 2022-03-29 RX ADMIN — SODIUM CHLORIDE 40 MG: 9 INJECTION INTRAMUSCULAR; INTRAVENOUS; SUBCUTANEOUS at 21:08

## 2022-03-29 RX ADMIN — ARFORMOTEROL TARTRATE 15 MCG: 15 SOLUTION RESPIRATORY (INHALATION) at 08:43

## 2022-03-29 RX ADMIN — SUCRALFATE 1 G: 1 TABLET ORAL at 17:20

## 2022-03-29 RX ADMIN — ENOXAPARIN SODIUM 40 MG: 100 INJECTION SUBCUTANEOUS at 09:10

## 2022-03-29 RX ADMIN — IPRATROPIUM BROMIDE AND ALBUTEROL SULFATE 3 ML: .5; 2.5 SOLUTION RESPIRATORY (INHALATION) at 08:44

## 2022-03-29 RX ADMIN — AMOXICILLIN AND CLAVULANATE POTASSIUM 1 TABLET: 875; 125 TABLET, FILM COATED ORAL at 09:09

## 2022-03-29 RX ADMIN — SUCRALFATE 1 G: 1 TABLET ORAL at 05:55

## 2022-03-29 RX ADMIN — DOXAZOSIN 1 MG: 1 TABLET ORAL at 09:09

## 2022-03-29 RX ADMIN — Medication 1 TABLET: at 09:09

## 2022-03-29 RX ADMIN — METOPROLOL SUCCINATE 25 MG: 25 TABLET, EXTENDED RELEASE ORAL at 09:09

## 2022-03-29 RX ADMIN — Medication 10 ML: at 09:17

## 2022-03-29 ASSESSMENT — PAIN SCALES - GENERAL
PAINLEVEL_OUTOF10: 1
PAINLEVEL_OUTOF10: 3
PAINLEVEL_OUTOF10: 0
PAINLEVEL_OUTOF10: 5
PAINLEVEL_OUTOF10: 0
PAINLEVEL_OUTOF10: 0

## 2022-03-29 ASSESSMENT — PAIN DESCRIPTION - DESCRIPTORS
DESCRIPTORS: ACHING;DISCOMFORT;HEADACHE
DESCRIPTORS: HEADACHE

## 2022-03-29 ASSESSMENT — PAIN DESCRIPTION - PAIN TYPE
TYPE: ACUTE PAIN
TYPE: ACUTE PAIN

## 2022-03-29 ASSESSMENT — PAIN DESCRIPTION - FREQUENCY
FREQUENCY: CONTINUOUS
FREQUENCY: INTERMITTENT

## 2022-03-29 ASSESSMENT — PAIN DESCRIPTION - PROGRESSION: CLINICAL_PROGRESSION: GRADUALLY IMPROVING

## 2022-03-29 ASSESSMENT — PAIN DESCRIPTION - LOCATION
LOCATION: HEAD
LOCATION: HEAD

## 2022-03-29 ASSESSMENT — PAIN DESCRIPTION - ONSET: ONSET: GRADUAL

## 2022-03-29 NOTE — PROGRESS NOTES
Hospitalist Progress Note      SYNOPSIS: Patient admitted on 3/26/2022 for N/V      SUBJECTIVE:    Patient seen and examined. He states that he feels better. He was able to tolerate his food and have no difficulty with eating; no further nausea or vomiting. Slight  Headache today. Patient with Bradycardia this morning. Stable overnight. No other overnight issues reported. Temp (24hrs), Av.5 °F (36.4 °C), Min:96.5 °F (35.8 °C), Max:98.3 °F (36.8 °C)    DIET: ADULT DIET; Regular; Low Sodium (2 gm); GI [place] (GERD/Peptic Ulcer)  CODE: Full Code    Intake/Output Summary (Last 24 hours) at 3/29/2022 3957  Last data filed at 3/29/2022 1436  Gross per 24 hour   Intake 720 ml   Output --   Net 720 ml       OBJECTIVE:    BP (!) 140/78   Pulse 55   Temp 97.7 °F (36.5 °C) (Oral)   Resp 17   Ht 6' (1.829 m)   Wt 175 lb (79.4 kg)   SpO2 94%   BMI 23.73 kg/m²     General appearance: No apparent distress, appears stated age and cooperative. HEENT:  Conjunctivae/corneas clear. Neck: Supple. No jugular venous distention. Respiratory: Clear to auscultation bilaterally, normal respiratory effort  Cardiovascular: Bradycardic; normal rhythm, normal S1-S2  Abdomen: Soft, NT/ND +BS. Musculoskeletal: No clubbing, cyanosis, no bilateral lower extremity edema. Brisk capillary refill. Skin:  No rashes  on visible skin  Neurologic: awake, alert and following commands     ASSESSMENT:  66 y.o. H/o ETOH, CHF, depression, Hep B/Hep C with coma, heroin abuse, sleep apnea, H/o SI complains of nausea vomiting and diarrhea, beginning 2 days ago. PLAN:  1.) Abd pain: CT abd pelvis :No acute abdominopelvic abnormality. Focus of intravesicular gas, likely related to recent instrumentation. Clinical correlation recommended.  Correlate with urinalysis to exclude infection. Thickening of the distal esophageal wall, which could suggest reflux esophagitis.  Clinical correlation recommended. Urine cx: No growth present. Soft diet changed to cardiac/bland diet- tolerating. Patient on protonix BID; carafate started Will need GI F/u- OP for EGD. Patient's symptoms improved- can f/u OP for an EGD. LFTS/Lipase are wnl.  2.) Bradycardia: TSH WNL. Will D/c norvasc and decrease the dose of toprol from 25 to 12.5 BID with holding parameters. Patient with uncontrolled hypertension on admission; monitor and adjust medications as needed. 3.) Headache: CT head:   Impression   No acute intracranial abnormality or hemorrhage.       Periventricular leukomalacia.       Right maxillary sinusitis. Will start on Augmentin to finish a 10 day course. 4.) HTN urgency: Improved. Patient was in pain secondary to his abdomen. 5.) ETOH abuse: May be exacerbating cause for #1; methadone/thiamine/MVI. CIWA scoring. Patient denies drinking ETOH when asked. 6.) Hep B/Hep C   7.) CHF: No exacerbation  8.) Lactic acidosis: resolved  9.) Hypokalemia: Resolved  10.) DVT proph: lovenox     D/c planning- home with f/u PCP - Once bradycardia/HTN are controlled. And Referral to Gastroenterology for EGD OP      DISPOSITION: Home when medically stable.     Medications:  REVIEWED DAILY      Infusion Medications    sodium chloride      sodium chloride       Scheduled Medications    sucralfate  1 g Oral 4 times per day    amoxicillin-clavulanate  1 tablet Oral 2 times per day    pantoprazole (PROTONIX) 40 mg injection  40 mg IntraVENous BID    amLODIPine  5 mg Oral Daily    Arformoterol Tartrate  15 mcg Nebulization BID    budesonide  500 mcg Nebulization BID    metoprolol succinate  25 mg Oral BID    doxazosin  1 mg Oral Daily    sodium chloride flush  5-40 mL IntraVENous 2 times per day    enoxaparin  40 mg SubCUTAneous Daily    multivitamin  1 tablet Oral Daily    thiamine mononitrate  100 mg Oral Daily    folic acid  1 mg Oral Daily    sodium chloride flush  5-40 mL IntraVENous 2 times per day    methadone  110 mg Oral Daily     PRN Meds: sodium chloride flush, calcium carbonate, aluminum & magnesium hydroxide-simethicone, trimethobenzamide, ipratropium-albuterol, sodium chloride flush, sodium chloride, polyethylene glycol, acetaminophen **OR** acetaminophen, hydrALAZINE, sodium chloride flush, sodium chloride, LORazepam **OR** LORazepam **OR** LORazepam **OR** LORazepam **OR** LORazepam **OR** LORazepam **OR** LORazepam **OR** LORazepam        Labs:     Recent Labs     03/27/22 0431 03/28/22 0441 03/29/22 0424   WBC 11.3 7.7 7.1   HGB 12.0* 12.4* 12.2*   HCT 36.3* 37.5 37.4    197 191       Recent Labs     03/27/22 0431 03/28/22 0440 03/29/22  0424    136 136   K 3.1* 3.7 3.8    98 99   CO2 25 30* 26   BUN 30* 16 10   CREATININE 0.8 0.8 0.8   CALCIUM 8.7 9.0 9.0   PHOS  --  2.7 2.6       Recent Labs     03/27/22 0431 03/28/22 0440 03/29/22  0424   PROT 7.1 7.0 7.2   ALKPHOS 58 67 82   ALT 13 18 26   AST 28 33 32   BILITOT 0.4 0.3 0.2       No results for input(s): INR in the last 72 hours. No results for input(s): Ethelene Soulier in the last 72 hours. Chronic labs:    Lab Results   Component Value Date    TSH 3.380 03/29/2022    INR 0.9 12/10/2015    LABA1C 5.5 11/26/2015       Radiology: REVIEWED DAILY    +++++++++++++++++++++++++++++++++++++++++++++++++  Terry Batres MD  [employer] - [address] [facility], [place]  +++++++++++++++++++++++++++++++++++++++++++++++++  NOTE: This report was transcribed using voice recognition software. Every effort was made to ensure accuracy; however, inadvertent computerized transcription errors may be present.

## 2022-03-30 VITALS
BODY MASS INDEX: 23.7 KG/M2 | HEIGHT: 72 IN | WEIGHT: 175 LBS | OXYGEN SATURATION: 99 % | RESPIRATION RATE: 18 BRPM | HEART RATE: 53 BPM | DIASTOLIC BLOOD PRESSURE: 81 MMHG | SYSTOLIC BLOOD PRESSURE: 166 MMHG | TEMPERATURE: 98.2 F

## 2022-03-30 LAB
ALBUMIN SERPL-MCNC: 3.6 G/DL (ref 3.5–5.2)
ALP BLD-CCNC: 81 U/L (ref 40–129)
ALT SERPL-CCNC: 23 U/L (ref 0–40)
ANION GAP SERPL CALCULATED.3IONS-SCNC: 9 MMOL/L (ref 7–16)
AST SERPL-CCNC: 22 U/L (ref 0–39)
BASOPHILS ABSOLUTE: 0.05 E9/L (ref 0–0.2)
BASOPHILS RELATIVE PERCENT: 0.7 % (ref 0–2)
BILIRUB SERPL-MCNC: 0.2 MG/DL (ref 0–1.2)
BUN BLDV-MCNC: 15 MG/DL (ref 6–23)
CALCIUM SERPL-MCNC: 9.2 MG/DL (ref 8.6–10.2)
CHLORIDE BLD-SCNC: 100 MMOL/L (ref 98–107)
CO2: 27 MMOL/L (ref 22–29)
CREAT SERPL-MCNC: 0.8 MG/DL (ref 0.7–1.2)
EOSINOPHILS ABSOLUTE: 0.39 E9/L (ref 0.05–0.5)
EOSINOPHILS RELATIVE PERCENT: 5.4 % (ref 0–6)
GFR AFRICAN AMERICAN: >60
GFR NON-AFRICAN AMERICAN: >60 ML/MIN/1.73
GLUCOSE BLD-MCNC: 103 MG/DL (ref 74–99)
HCT VFR BLD CALC: 36.9 % (ref 37–54)
HEMOGLOBIN: 12.1 G/DL (ref 12.5–16.5)
IMMATURE GRANULOCYTES #: 0.03 E9/L
IMMATURE GRANULOCYTES %: 0.4 % (ref 0–5)
LYMPHOCYTES ABSOLUTE: 3.02 E9/L (ref 1.5–4)
LYMPHOCYTES RELATIVE PERCENT: 41.7 % (ref 20–42)
MAGNESIUM: 2.3 MG/DL (ref 1.6–2.6)
MCH RBC QN AUTO: 31 PG (ref 26–35)
MCHC RBC AUTO-ENTMCNC: 32.8 % (ref 32–34.5)
MCV RBC AUTO: 94.6 FL (ref 80–99.9)
MONOCYTES ABSOLUTE: 0.69 E9/L (ref 0.1–0.95)
MONOCYTES RELATIVE PERCENT: 9.5 % (ref 2–12)
NEUTROPHILS ABSOLUTE: 3.07 E9/L (ref 1.8–7.3)
NEUTROPHILS RELATIVE PERCENT: 42.3 % (ref 43–80)
PDW BLD-RTO: 13.6 FL (ref 11.5–15)
PHOSPHORUS: 2.9 MG/DL (ref 2.5–4.5)
PLATELET # BLD: 212 E9/L (ref 130–450)
PMV BLD AUTO: 10.5 FL (ref 7–12)
POTASSIUM SERPL-SCNC: 4 MMOL/L (ref 3.5–5)
RBC # BLD: 3.9 E12/L (ref 3.8–5.8)
SODIUM BLD-SCNC: 136 MMOL/L (ref 132–146)
TOTAL PROTEIN: 7.1 G/DL (ref 6.4–8.3)
WBC # BLD: 7.3 E9/L (ref 4.5–11.5)

## 2022-03-30 PROCEDURE — 84100 ASSAY OF PHOSPHORUS: CPT

## 2022-03-30 PROCEDURE — 36415 COLL VENOUS BLD VENIPUNCTURE: CPT

## 2022-03-30 PROCEDURE — 94640 AIRWAY INHALATION TREATMENT: CPT

## 2022-03-30 PROCEDURE — 6370000000 HC RX 637 (ALT 250 FOR IP): Performed by: INTERNAL MEDICINE

## 2022-03-30 PROCEDURE — 6370000000 HC RX 637 (ALT 250 FOR IP): Performed by: FAMILY MEDICINE

## 2022-03-30 PROCEDURE — 85025 COMPLETE CBC W/AUTO DIFF WBC: CPT

## 2022-03-30 PROCEDURE — 6360000002 HC RX W HCPCS: Performed by: FAMILY MEDICINE

## 2022-03-30 PROCEDURE — C9113 INJ PANTOPRAZOLE SODIUM, VIA: HCPCS | Performed by: FAMILY MEDICINE

## 2022-03-30 PROCEDURE — 2580000003 HC RX 258: Performed by: FAMILY MEDICINE

## 2022-03-30 PROCEDURE — 80053 COMPREHEN METABOLIC PANEL: CPT

## 2022-03-30 PROCEDURE — A4216 STERILE WATER/SALINE, 10 ML: HCPCS | Performed by: FAMILY MEDICINE

## 2022-03-30 PROCEDURE — 83735 ASSAY OF MAGNESIUM: CPT

## 2022-03-30 RX ORDER — FLUTICASONE PROPIONATE 50 MCG
1 SPRAY, SUSPENSION (ML) NASAL DAILY
Qty: 32 G | Refills: 0 | Status: SHIPPED | OUTPATIENT
Start: 2022-03-30

## 2022-03-30 RX ORDER — PROCHLORPERAZINE MALEATE 10 MG
10 TABLET ORAL EVERY 6 HOURS PRN
Qty: 20 TABLET | Refills: 0 | Status: ON HOLD | OUTPATIENT
Start: 2022-03-30 | End: 2022-07-25 | Stop reason: HOSPADM

## 2022-03-30 RX ORDER — METOPROLOL SUCCINATE 25 MG/1
12.5 TABLET, EXTENDED RELEASE ORAL 2 TIMES DAILY
Qty: 30 TABLET | Refills: 3 | Status: SHIPPED | OUTPATIENT
Start: 2022-03-30

## 2022-03-30 RX ORDER — SUCRALFATE 1 G/1
1 TABLET ORAL 4 TIMES DAILY
Qty: 120 TABLET | Refills: 0 | Status: SHIPPED | OUTPATIENT
Start: 2022-03-30

## 2022-03-30 RX ADMIN — ACETAMINOPHEN 650 MG: 325 TABLET ORAL at 11:49

## 2022-03-30 RX ADMIN — ENOXAPARIN SODIUM 40 MG: 100 INJECTION SUBCUTANEOUS at 08:52

## 2022-03-30 RX ADMIN — DOXAZOSIN 1 MG: 1 TABLET ORAL at 08:45

## 2022-03-30 RX ADMIN — Medication 1 TABLET: at 08:45

## 2022-03-30 RX ADMIN — ARFORMOTEROL TARTRATE 15 MCG: 15 SOLUTION RESPIRATORY (INHALATION) at 09:21

## 2022-03-30 RX ADMIN — THIAMINE HCL TAB 100 MG 100 MG: 100 TAB at 08:45

## 2022-03-30 RX ADMIN — Medication 110 MG: at 08:45

## 2022-03-30 RX ADMIN — SUCRALFATE 1 G: 1 TABLET ORAL at 11:44

## 2022-03-30 RX ADMIN — SUCRALFATE 1 G: 1 TABLET ORAL at 06:20

## 2022-03-30 RX ADMIN — Medication 10 ML: at 08:52

## 2022-03-30 RX ADMIN — FOLIC ACID 1 MG: 1 TABLET ORAL at 08:45

## 2022-03-30 RX ADMIN — BUDESONIDE 500 MCG: 0.5 SUSPENSION RESPIRATORY (INHALATION) at 09:21

## 2022-03-30 RX ADMIN — SODIUM CHLORIDE 40 MG: 9 INJECTION INTRAMUSCULAR; INTRAVENOUS; SUBCUTANEOUS at 08:46

## 2022-03-30 RX ADMIN — AMOXICILLIN AND CLAVULANATE POTASSIUM 1 TABLET: 875; 125 TABLET, FILM COATED ORAL at 08:45

## 2022-03-30 RX ADMIN — METOPROLOL SUCCINATE 12.5 MG: 25 TABLET, EXTENDED RELEASE ORAL at 08:44

## 2022-03-30 ASSESSMENT — PAIN SCALES - GENERAL
PAINLEVEL_OUTOF10: 0
PAINLEVEL_OUTOF10: 3
PAINLEVEL_OUTOF10: 0
PAINLEVEL_OUTOF10: 0

## 2022-03-30 NOTE — DISCHARGE SUMMARY
Physician Discharge Summary     Patient ID:  June Woods  [id number]  50 y.o.  1955    Admit date: 3/26/2022    Discharge date and time:  3/30/2022    Discharge Diagnoses: Principal Problem:    Intractable nausea and vomiting  Resolved Problems:    * No resolved hospital problems. *      Consults: IP CONSULT TO INTERNAL MEDICINE  IP CONSULT TO SOCIAL WORK    Procedures: See below    Hospital Course: ASSESSMENT:  78 y.o. H/o ETOH, CHF, depression, Hep B/Hep C with coma, heroin abuse, sleep apnea, H/o SI complains of nausea vomiting and diarrhea, beginning 2 days ago. PLAN:  1.) suspected gastroenteritis--Abd pain: CT abd pelvis :No acute abdominopelvic abnormality. Focus of intravesicular gas, likely related to recent instrumentation. Clinical correlation recommended.  Correlate with urinalysis to exclude infection. Thickening of the distal esophageal wall, which could suggest reflux esophagitis.  Clinical correlation recommended. Urine cx: No growth present. Soft diet changed to cardiac/bland diet- tolerating. Patient on protonix BID; carafate started Will need GI F/u- OP for EGD. Patient's symptoms improved- can f/u OP for an EGD. LFTS/Lipase are wnl.     2.) Bradycardia-- asymptomatic: TSH WNL. Continue norvasc and decrease the dose of toprol from 25 to 12.5 BID with holding parameters. Improved. 3.) Headache: CT head:   Impression   No acute intracranial abnormality or hemorrhage.       Periventricular leukomalacia.       Right maxillary sinusitis. sinusitis, chronic patient notes history of chronic sinusitis. He denies any acute facial pain, fever or purulent drainage. No indication for antibiotics. Start nasal steroids. 4.) HTN urgency: Improved. Patient was in pain secondary to his abdomen. 5.) ETOH abuse: May be exacerbating cause for #1; methadone/thiamine/MVI. CIWA scoring. Patient denies drinking ETOH when asked.    6.) Hep B/Hep C   7.)  Chronic diastolic CHF: No exacerbation  8.) Lactic acidosis: resolved  9.) Hypokalemia: Resolved  10.) DVT proph: lovenox  11)     D/c planning- home with f/u PCP - And Referral to Gastroenterology for EGD OP      Discharge Exam:  See progress note from today    Condition:  Stable    Disposition: home    Patient Instructions:   Current Discharge Medication List      START taking these medications    Details   sucralfate (CARAFATE) 1 GM tablet Take 1 tablet by mouth 4 times daily  Qty: 120 tablet, Refills: 0      fluticasone (FLONASE) 50 MCG/ACT nasal spray 1 spray by Each Nostril route daily  Qty: 32 g, Refills: 0      prochlorperazine (COMPAZINE) 10 MG tablet Take 1 tablet by mouth every 6 hours as needed (nausea/vomiting)  Qty: 20 tablet, Refills: 0         CONTINUE these medications which have CHANGED    Details   metoprolol succinate (TOPROL XL) 25 MG extended release tablet Take 0.5 tablets by mouth 2 times daily  Qty: 30 tablet, Refills: 3         CONTINUE these medications which have NOT CHANGED    Details   ondansetron (ZOFRAN ODT) 4 MG disintegrating tablet Take 1 tablet by mouth every 8 hours as needed for Nausea or Vomiting  Qty: 10 tablet, Refills: 0      lansoprazole (PREVACID) 30 MG delayed release capsule Take 1 capsule by mouth daily  Qty: 30 capsule, Refills: 3      aspirin 81 MG chewable tablet Take 1 tablet by mouth daily  Qty: 30 tablet, Refills: 3      amLODIPine (NORVASC) 5 MG tablet Take 1 tablet by mouth daily  Qty: 30 tablet, Refills: 3      gabapentin (NEURONTIN) 100 MG capsule Take 2 capsules by mouth 3 times daily  Qty: 90 capsule, Refills: 3      Arformoterol Tartrate (BROVANA) 15 MCG/2ML NEBU Take 2 mLs by nebulization 2 times daily  Qty: 120 mL, Refills: 3      budesonide (PULMICORT) 0.5 MG/2ML nebulizer suspension Take 2 mLs by nebulization 2 times daily  Qty: 60 ampule, Refills: 3      ipratropium-albuterol (DUONEB) 0.5-2.5 (3) MG/3ML SOLN nebulizer solution Inhale 3 mLs into the lungs every 4 hours (while awake)  Qty: 360 mL, Refills: 3      ibuprofen (ADVIL;MOTRIN) 600 MG tablet Take 600 mg by mouth every 8 hours      methadone (DOLOPHINE) 10 MG tablet Take 100 mg by mouth daily 8 am      terazosin (HYTRIN) 1 MG capsule Take by mouth nightly Patient does not know dose      tadalafil (CIALIS) 10 MG tablet Take 10 mg by mouth as needed for Erectile Dysfunction.          STOP taking these medications       predniSONE (DELTASONE) 10 MG tablet Comments:   Reason for Stopping:         dicyclomine (BENTYL) 10 MG capsule Comments:   Reason for Stopping:             Activity: activity as tolerated  Diet: regular diet    Follow-up with only PCP, 2 weeks GI    Note that over 30 minutes was spent in preparing discharge papers, discussing discharge with patient, staff, consultants, medication review, arranging follow up, etc.    Signed:  Yanet Oliveira MD  3/30/2022  10:23 AM

## 2022-03-30 NOTE — PROGRESS NOTES
Hospitalist Progress Note      SYNOPSIS: Patient admitted on 3/26/2022 for N/V      SUBJECTIVE:    Patient seen and examined. He states that he feels better. He was able to tolerate his food and have no difficulty with eating; no further nausea or vomiting. Slight  Headache today. Patient with Bradycardia yesterday while resting in bed denies any symptoms of lightheadedness, chest pain, shortness of breath, or dizziness    Stable overnight. No other overnight issues reported. Temp (24hrs), Av.4 °F (36.3 °C), Min:96.5 °F (35.8 °C), Max:98.3 °F (36.8 °C)    DIET: ADULT DIET; Regular; Low Sodium (2 gm); GI [place] (GERD/Peptic Ulcer)  CODE: Full Code    Intake/Output Summary (Last 24 hours) at 3/30/2022 1012  Last data filed at 3/30/2022 0654  Gross per 24 hour   Intake 480 ml   Output 400 ml   Net 80 ml       OBJECTIVE:    BP (!) 166/81   Pulse 53   Temp 98.2 °F (36.8 °C) (Oral)   Resp 18   Ht 6' (1.829 m)   Wt 175 lb (79.4 kg)   SpO2 99%   BMI 23.73 kg/m²     General appearance: No apparent distress, appears stated age and cooperative. HEENT:  Conjunctivae/corneas clear. Neck: Supple. No jugular venous distention. Respiratory: Clear to auscultation bilaterally, normal respiratory effort  Cardiovascular: Bradycardic; normal rhythm, normal S1-S2  Abdomen: Soft, NT/ND +BS. Musculoskeletal: No clubbing, cyanosis, no bilateral lower extremity edema. Brisk capillary refill. Skin:  No rashes  on visible skin  Neurologic: awake, alert and following commands     ASSESSMENT:  66 y.o. H/o ETOH, CHF, depression, Hep B/Hep C with coma, heroin abuse, sleep apnea, H/o SI complains of nausea vomiting and diarrhea, beginning 2 days ago. PLAN:  1.) Abd pain: CT abd pelvis :No acute abdominopelvic abnormality. Focus of intravesicular gas, likely related to recent instrumentation.  Clinical correlation recommended.  Correlate with urinalysis to exclude infection. Thickening of the distal esophageal wall, which could suggest reflux esophagitis.  Clinical correlation recommended. Urine cx: No growth present. Soft diet changed to cardiac/bland diet- tolerating. Patient on protonix BID; carafate started Will need GI F/u- OP for EGD. Patient's symptoms improved- can f/u OP for an EGD. LFTS/Lipase are wnl.   2.) Bradycardia: TSH WNL. Continue norvasc and decrease the dose of toprol from 25 to 12.5 BID with holding parameters. Patient with uncontrolled hypertension on admission; monitor and adjust medications as needed. 3.) Headache: CT head:   Impression   No acute intracranial abnormality or hemorrhage.       Periventricular leukomalacia.       Right maxillary sinusitis. Will start on Augmentin to finish a 10 day course. 4.) HTN urgency: Improved. Patient was in pain secondary to his abdomen. 5.) ETOH abuse: May be exacerbating cause for #1; methadone/thiamine/MVI. CIWA scoring. Patient denies drinking ETOH when asked. 6.) Hep B/Hep C   7.)  Chronic diastolic CHF: No exacerbation  8.) Lactic acidosis: resolved  9.) Hypokalemia: Resolved  10.) DVT proph: lovenox     D/c planning- home with f/u PCP - And Referral to Gastroenterology for EGD OP      DISPOSITION: Home when medically stable.     Medications:  REVIEWED DAILY      Infusion Medications    sodium chloride      sodium chloride       Scheduled Medications    metoprolol succinate  12.5 mg Oral BID    sucralfate  1 g Oral 4 times per day    amoxicillin-clavulanate  1 tablet Oral 2 times per day    pantoprazole (PROTONIX) 40 mg injection  40 mg IntraVENous BID    Arformoterol Tartrate  15 mcg Nebulization BID    budesonide  500 mcg Nebulization BID    doxazosin  1 mg Oral Daily    sodium chloride flush  5-40 mL IntraVENous 2 times per day    enoxaparin  40 mg SubCUTAneous Daily    multivitamin  1 tablet Oral Daily    thiamine mononitrate  100 mg Oral Daily    folic acid  1 mg Oral Daily    sodium chloride flush  5-40 mL IntraVENous 2 times per day    methadone  110 mg Oral Daily     PRN Meds: sodium chloride flush, calcium carbonate, aluminum & magnesium hydroxide-simethicone, trimethobenzamide, ipratropium-albuterol, sodium chloride flush, sodium chloride, polyethylene glycol, acetaminophen **OR** acetaminophen, hydrALAZINE, sodium chloride flush, sodium chloride, LORazepam **OR** LORazepam **OR** LORazepam **OR** LORazepam **OR** LORazepam **OR** LORazepam **OR** LORazepam **OR** LORazepam        Labs:     Recent Labs     03/28/22 0441 03/29/22 0424 03/30/22 0432   WBC 7.7 7.1 7.3   HGB 12.4* 12.2* 12.1*   HCT 37.5 37.4 36.9*    191 212       Recent Labs     03/28/22 0440 03/29/22 0424 03/30/22 0432    136 136   K 3.7 3.8 4.0   CL 98 99 100   CO2 30* 26 27   BUN 16 10 15   CREATININE 0.8 0.8 0.8   CALCIUM 9.0 9.0 9.2   PHOS 2.7 2.6 2.9       Recent Labs     03/28/22 0440 03/29/22 0424 03/30/22  0432   PROT 7.0 7.2 7.1   ALKPHOS 67 82 81   ALT 18 26 23   AST 33 32 22   BILITOT 0.3 0.2 0.2       No results for input(s): INR in the last 72 hours. No results for input(s): Nnamdi Bares in the last 72 hours. Chronic labs:    Lab Results   Component Value Date    TSH 3.380 03/29/2022    INR 0.9 12/10/2015    LABA1C 5.5 11/26/2015       Radiology: REVIEWED DAILY    +++++++++++++++++++++++++++++++++++++++++++++++++  Aneesh Collins MD  [employer] - [facility], [place]  +++++++++++++++++++++++++++++++++++++++++++++++++  NOTE: This report was transcribed using voice recognition software. Every effort was made to ensure accuracy; however, inadvertent computerized transcription errors may be present.

## 2022-03-31 LAB
BLOOD CULTURE, ROUTINE: NORMAL
CULTURE, BLOOD 2: NORMAL

## 2022-07-21 ENCOUNTER — APPOINTMENT (OUTPATIENT)
Dept: CT IMAGING | Age: 67
DRG: 184 | End: 2022-07-21
Payer: OTHER GOVERNMENT

## 2022-07-21 ENCOUNTER — APPOINTMENT (OUTPATIENT)
Dept: GENERAL RADIOLOGY | Age: 67
DRG: 184 | End: 2022-07-21
Payer: OTHER GOVERNMENT

## 2022-07-21 ENCOUNTER — HOSPITAL ENCOUNTER (INPATIENT)
Age: 67
LOS: 3 days | Discharge: OTHER INSTITUTION | DRG: 184 | End: 2022-07-25
Attending: EMERGENCY MEDICINE | Admitting: SURGERY
Payer: OTHER GOVERNMENT

## 2022-07-21 DIAGNOSIS — S12.401A CLOSED NONDISPLACED FRACTURE OF FIFTH CERVICAL VERTEBRA, UNSPECIFIED FRACTURE MORPHOLOGY, INITIAL ENCOUNTER (HCC): Primary | ICD-10-CM

## 2022-07-21 DIAGNOSIS — S22.41XA CLOSED FRACTURE OF MULTIPLE RIBS OF RIGHT SIDE, INITIAL ENCOUNTER: ICD-10-CM

## 2022-07-21 DIAGNOSIS — S22.49XA: ICD-10-CM

## 2022-07-21 LAB
ACETAMINOPHEN LEVEL: <5 MCG/ML (ref 10–30)
ALBUMIN SERPL-MCNC: 4.2 G/DL (ref 3.5–5.2)
ALP BLD-CCNC: 93 U/L (ref 40–129)
ALT SERPL-CCNC: 13 U/L (ref 0–40)
AMMONIA: 19 UMOL/L (ref 16–60)
AMORPHOUS: PRESENT
AMPHETAMINE SCREEN, URINE: NOT DETECTED
ANION GAP SERPL CALCULATED.3IONS-SCNC: 10 MMOL/L (ref 7–16)
AST SERPL-CCNC: 22 U/L (ref 0–39)
BACTERIA: ABNORMAL /HPF
BARBITURATE SCREEN URINE: POSITIVE
BASOPHILS ABSOLUTE: 0.08 E9/L (ref 0–0.2)
BASOPHILS RELATIVE PERCENT: 0.9 % (ref 0–2)
BENZODIAZEPINE SCREEN, URINE: POSITIVE
BILIRUB SERPL-MCNC: <0.2 MG/DL (ref 0–1.2)
BILIRUBIN URINE: ABNORMAL
BLOOD, URINE: ABNORMAL
BUN BLDV-MCNC: 22 MG/DL (ref 6–23)
CALCIUM SERPL-MCNC: 9.2 MG/DL (ref 8.6–10.2)
CANNABINOID SCREEN URINE: NOT DETECTED
CHLORIDE BLD-SCNC: 106 MMOL/L (ref 98–107)
CLARITY: CLEAR
CO2: 26 MMOL/L (ref 22–29)
COCAINE METABOLITE SCREEN URINE: POSITIVE
COLOR: YELLOW
CREAT SERPL-MCNC: 1 MG/DL (ref 0.7–1.2)
EOSINOPHILS ABSOLUTE: 0.37 E9/L (ref 0.05–0.5)
EOSINOPHILS RELATIVE PERCENT: 3.9 % (ref 0–6)
ETHANOL: <10 MG/DL (ref 0–0.08)
FENTANYL SCREEN, URINE: POSITIVE
GFR AFRICAN AMERICAN: >60
GFR NON-AFRICAN AMERICAN: >60 ML/MIN/1.73
GLUCOSE BLD-MCNC: 80 MG/DL (ref 74–99)
GLUCOSE URINE: NEGATIVE MG/DL
HCT VFR BLD CALC: 40.8 % (ref 37–54)
HEMOGLOBIN: 12.9 G/DL (ref 12.5–16.5)
IMMATURE GRANULOCYTES #: 0.02 E9/L
IMMATURE GRANULOCYTES %: 0.2 % (ref 0–5)
KETONES, URINE: NEGATIVE MG/DL
LACTIC ACID: 1 MMOL/L (ref 0.5–2.2)
LEUKOCYTE ESTERASE, URINE: NEGATIVE
LIPASE: 14 U/L (ref 13–60)
LYMPHOCYTES ABSOLUTE: 2.67 E9/L (ref 1.5–4)
LYMPHOCYTES RELATIVE PERCENT: 28.4 % (ref 20–42)
Lab: ABNORMAL
MCH RBC QN AUTO: 30.6 PG (ref 26–35)
MCHC RBC AUTO-ENTMCNC: 31.6 % (ref 32–34.5)
MCV RBC AUTO: 96.7 FL (ref 80–99.9)
METHADONE SCREEN, URINE: POSITIVE
MONOCYTES ABSOLUTE: 1.02 E9/L (ref 0.1–0.95)
MONOCYTES RELATIVE PERCENT: 10.8 % (ref 2–12)
NEUTROPHILS ABSOLUTE: 5.25 E9/L (ref 1.8–7.3)
NEUTROPHILS RELATIVE PERCENT: 55.8 % (ref 43–80)
NITRITE, URINE: NEGATIVE
OPIATE SCREEN URINE: NOT DETECTED
OXYCODONE URINE: NOT DETECTED
PDW BLD-RTO: 14 FL (ref 11.5–15)
PH UA: 6 (ref 5–9)
PHENCYCLIDINE SCREEN URINE: POSITIVE
PLATELET # BLD: 200 E9/L (ref 130–450)
PMV BLD AUTO: 10 FL (ref 7–12)
POTASSIUM REFLEX MAGNESIUM: 4.5 MMOL/L (ref 3.5–5)
PROTEIN UA: ABNORMAL MG/DL
RBC # BLD: 4.22 E12/L (ref 3.8–5.8)
RBC UA: ABNORMAL /HPF (ref 0–2)
SALICYLATE, SERUM: <0.3 MG/DL (ref 0–30)
SODIUM BLD-SCNC: 142 MMOL/L (ref 132–146)
SPECIFIC GRAVITY UA: >=1.03 (ref 1–1.03)
TOTAL PROTEIN: 8.2 G/DL (ref 6.4–8.3)
TRICYCLIC ANTIDEPRESSANTS SCREEN SERUM: NEGATIVE NG/ML
TROPONIN, HIGH SENSITIVITY: <6 NG/L (ref 0–11)
UROBILINOGEN, URINE: 0.2 E.U./DL
WBC # BLD: 9.4 E9/L (ref 4.5–11.5)
WBC UA: ABNORMAL /HPF (ref 0–5)

## 2022-07-21 PROCEDURE — 84484 ASSAY OF TROPONIN QUANT: CPT

## 2022-07-21 PROCEDURE — 71260 CT THORAX DX C+: CPT

## 2022-07-21 PROCEDURE — 80179 DRUG ASSAY SALICYLATE: CPT

## 2022-07-21 PROCEDURE — 71046 X-RAY EXAM CHEST 2 VIEWS: CPT

## 2022-07-21 PROCEDURE — 93005 ELECTROCARDIOGRAM TRACING: CPT | Performed by: PHYSICIAN ASSISTANT

## 2022-07-21 PROCEDURE — 83690 ASSAY OF LIPASE: CPT

## 2022-07-21 PROCEDURE — 36415 COLL VENOUS BLD VENIPUNCTURE: CPT

## 2022-07-21 PROCEDURE — 85025 COMPLETE CBC W/AUTO DIFF WBC: CPT

## 2022-07-21 PROCEDURE — 80053 COMPREHEN METABOLIC PANEL: CPT

## 2022-07-21 PROCEDURE — 81001 URINALYSIS AUTO W/SCOPE: CPT

## 2022-07-21 PROCEDURE — 80307 DRUG TEST PRSMV CHEM ANLYZR: CPT

## 2022-07-21 PROCEDURE — 87088 URINE BACTERIA CULTURE: CPT

## 2022-07-21 PROCEDURE — 99285 EMERGENCY DEPT VISIT HI MDM: CPT

## 2022-07-21 PROCEDURE — 82077 ASSAY SPEC XCP UR&BREATH IA: CPT

## 2022-07-21 PROCEDURE — 70450 CT HEAD/BRAIN W/O DYE: CPT

## 2022-07-21 PROCEDURE — 83605 ASSAY OF LACTIC ACID: CPT

## 2022-07-21 PROCEDURE — 6360000004 HC RX CONTRAST MEDICATION: Performed by: SPECIALIST

## 2022-07-21 PROCEDURE — 74177 CT ABD & PELVIS W/CONTRAST: CPT

## 2022-07-21 PROCEDURE — 80143 DRUG ASSAY ACETAMINOPHEN: CPT

## 2022-07-21 PROCEDURE — 82140 ASSAY OF AMMONIA: CPT

## 2022-07-21 PROCEDURE — 72125 CT NECK SPINE W/O DYE: CPT

## 2022-07-21 RX ADMIN — IOPAMIDOL 50 ML: 755 INJECTION, SOLUTION INTRAVENOUS at 22:42

## 2022-07-21 ASSESSMENT — ENCOUNTER SYMPTOMS
SINUS PAIN: 0
COUGH: 0
NAUSEA: 0
VOMITING: 0
SORE THROAT: 0
EYE PAIN: 0
CONSTIPATION: 0
BACK PAIN: 0
ABDOMINAL PAIN: 0
EYE REDNESS: 0
SHORTNESS OF BREATH: 0
CHEST TIGHTNESS: 0
DIARRHEA: 0
SINUS PRESSURE: 0

## 2022-07-21 NOTE — ED NOTES
Department of Emergency Medicine    FIRST PROVIDER TRIAGE NOTE             Independent MLP           7/21/22  7:33 PM EDT    Date of Encounter: 7/21/22   MRN: [id number]    Vitals:    07/21/22 1831 07/21/22 1932   BP:  (!) 158/90   Pulse: 83    Resp:  16   Temp: 98.3 °F (36.8 °C)    TempSrc: Oral    SpO2: 93%    Weight:  180 lb (81.6 kg)   Height:  6' (1.829 m)      HPI: Ramandeep Berry is a 75 y.o. male who presents to the ED for Altered Mental Status (Patient was dropped off from an employee from [employer] for Altered mental status. Patient is falling asleep during triage but alert and oriented at this time. )  Patient states he is on pain medication for his clavicle fx     ROS: Negative for cp or sob. Physical Exam:   Gen Appearance/Constitutional: alert, however, lethargic. Falling asleep in triage- easily aroused   HEENT: NC/NT. PERRLA,  Airway patent. CV: regular rate  Pulm: CTA bilat     Initial Plan of Care: All treatment areas with department are currently occupied. Plan to order/Initiate the following while awaiting opening in ED: labs, EKG, and imaging studies.     Initial Plan of Care: Initiate Treatment-Testing, Proceed toTreatment Area When Bed Available for ED Attending/MLP to Continue Care    Electronically signed by Daniel Reilly PA-C   DD: 7/21/22       Daniel Reilly PA-C  07/21/22 1935

## 2022-07-22 PROBLEM — S22.49XA CLOSED TRAUMATIC MINIMALLY DISPLACED FRACTURE OF MULTIPLE RIBS, INITIAL ENCOUNTER: Status: ACTIVE | Noted: 2022-07-22

## 2022-07-22 LAB
B.E.: -0.4 MMOL/L (ref -3–3)
COHB: 2.3 % (ref 0–1.5)
CRITICAL: ABNORMAL
DATE ANALYZED: ABNORMAL
DATE OF COLLECTION: ABNORMAL
EKG ATRIAL RATE: 61 BPM
EKG ATRIAL RATE: 66 BPM
EKG P AXIS: 33 DEGREES
EKG P AXIS: 47 DEGREES
EKG P-R INTERVAL: 124 MS
EKG P-R INTERVAL: 138 MS
EKG Q-T INTERVAL: 414 MS
EKG Q-T INTERVAL: 440 MS
EKG QRS DURATION: 102 MS
EKG QRS DURATION: 96 MS
EKG QTC CALCULATION (BAZETT): 434 MS
EKG QTC CALCULATION (BAZETT): 442 MS
EKG R AXIS: 47 DEGREES
EKG R AXIS: 53 DEGREES
EKG T AXIS: 54 DEGREES
EKG T AXIS: 59 DEGREES
EKG VENTRICULAR RATE: 61 BPM
EKG VENTRICULAR RATE: 66 BPM
HCO3: 25.5 MMOL/L (ref 22–26)
HHB: 6.7 % (ref 0–5)
LAB: ABNORMAL
Lab: ABNORMAL
METHB: 0.1 % (ref 0–1.5)
MODE: ABNORMAL
O2 SATURATION: 93.1 % (ref 92–98.5)
O2HB: 90.9 % (ref 94–97)
OPERATOR ID: 914
PATIENT TEMP: 37 C
PCO2: 46.3 MMHG (ref 35–45)
PH BLOOD GAS: 7.36 (ref 7.35–7.45)
PO2: 67.1 MMHG (ref 75–100)
SOURCE, BLOOD GAS: ABNORMAL
THB: 13.1 G/DL (ref 11.5–16.5)
TIME ANALYZED: 22

## 2022-07-22 PROCEDURE — 6370000000 HC RX 637 (ALT 250 FOR IP)

## 2022-07-22 PROCEDURE — 99223 1ST HOSP IP/OBS HIGH 75: CPT | Performed by: SURGERY

## 2022-07-22 PROCEDURE — 6370000000 HC RX 637 (ALT 250 FOR IP): Performed by: STUDENT IN AN ORGANIZED HEALTH CARE EDUCATION/TRAINING PROGRAM

## 2022-07-22 PROCEDURE — 1200000000 HC SEMI PRIVATE

## 2022-07-22 PROCEDURE — 97161 PT EVAL LOW COMPLEX 20 MIN: CPT

## 2022-07-22 PROCEDURE — 2580000003 HC RX 258: Performed by: STUDENT IN AN ORGANIZED HEALTH CARE EDUCATION/TRAINING PROGRAM

## 2022-07-22 PROCEDURE — 6360000002 HC RX W HCPCS: Performed by: STUDENT IN AN ORGANIZED HEALTH CARE EDUCATION/TRAINING PROGRAM

## 2022-07-22 PROCEDURE — 97535 SELF CARE MNGMENT TRAINING: CPT

## 2022-07-22 PROCEDURE — 97530 THERAPEUTIC ACTIVITIES: CPT

## 2022-07-22 PROCEDURE — 82805 BLOOD GASES W/O2 SATURATION: CPT

## 2022-07-22 PROCEDURE — 94640 AIRWAY INHALATION TREATMENT: CPT

## 2022-07-22 PROCEDURE — 97165 OT EVAL LOW COMPLEX 30 MIN: CPT

## 2022-07-22 RX ORDER — SENNA AND DOCUSATE SODIUM 50; 8.6 MG/1; MG/1
1 TABLET, FILM COATED ORAL 2 TIMES DAILY
Status: DISCONTINUED | OUTPATIENT
Start: 2022-07-22 | End: 2022-07-25 | Stop reason: HOSPADM

## 2022-07-22 RX ORDER — ACETAMINOPHEN 500 MG
1000 TABLET ORAL EVERY 8 HOURS SCHEDULED
Status: DISCONTINUED | OUTPATIENT
Start: 2022-07-22 | End: 2022-07-25 | Stop reason: HOSPADM

## 2022-07-22 RX ORDER — OXYCODONE HYDROCHLORIDE 5 MG/1
5 TABLET ORAL EVERY 4 HOURS PRN
Status: DISCONTINUED | OUTPATIENT
Start: 2022-07-22 | End: 2022-07-25 | Stop reason: HOSPADM

## 2022-07-22 RX ORDER — SODIUM CHLORIDE 9 MG/ML
25 INJECTION, SOLUTION INTRAVENOUS PRN
Status: DISCONTINUED | OUTPATIENT
Start: 2022-07-22 | End: 2022-07-25 | Stop reason: HOSPADM

## 2022-07-22 RX ORDER — AMLODIPINE BESYLATE 5 MG/1
5 TABLET ORAL DAILY
Status: DISCONTINUED | OUTPATIENT
Start: 2022-07-22 | End: 2022-07-25 | Stop reason: HOSPADM

## 2022-07-22 RX ORDER — GABAPENTIN 100 MG/1
200 CAPSULE ORAL 3 TIMES DAILY
Status: DISCONTINUED | OUTPATIENT
Start: 2022-07-22 | End: 2022-07-25 | Stop reason: HOSPADM

## 2022-07-22 RX ORDER — ONDANSETRON 4 MG/1
4 TABLET, ORALLY DISINTEGRATING ORAL EVERY 8 HOURS PRN
Status: DISCONTINUED | OUTPATIENT
Start: 2022-07-22 | End: 2022-07-25 | Stop reason: HOSPADM

## 2022-07-22 RX ORDER — METHOCARBAMOL 500 MG/1
1000 TABLET, FILM COATED ORAL 4 TIMES DAILY
Status: DISCONTINUED | OUTPATIENT
Start: 2022-07-22 | End: 2022-07-25 | Stop reason: HOSPADM

## 2022-07-22 RX ORDER — OXYCODONE HYDROCHLORIDE 10 MG/1
10 TABLET ORAL EVERY 4 HOURS PRN
Status: DISCONTINUED | OUTPATIENT
Start: 2022-07-22 | End: 2022-07-25 | Stop reason: HOSPADM

## 2022-07-22 RX ORDER — METHADONE HYDROCHLORIDE 10 MG/ML
120 CONCENTRATE ORAL DAILY
Status: DISCONTINUED | OUTPATIENT
Start: 2022-07-22 | End: 2022-07-25 | Stop reason: HOSPADM

## 2022-07-22 RX ORDER — SODIUM CHLORIDE 0.9 % (FLUSH) 0.9 %
5-40 SYRINGE (ML) INJECTION EVERY 12 HOURS SCHEDULED
Status: DISCONTINUED | OUTPATIENT
Start: 2022-07-22 | End: 2022-07-25 | Stop reason: HOSPADM

## 2022-07-22 RX ORDER — ONDANSETRON 2 MG/ML
4 INJECTION INTRAMUSCULAR; INTRAVENOUS EVERY 6 HOURS PRN
Status: DISCONTINUED | OUTPATIENT
Start: 2022-07-22 | End: 2022-07-25 | Stop reason: HOSPADM

## 2022-07-22 RX ORDER — LIDOCAINE 4 G/G
1 PATCH TOPICAL DAILY
Status: DISCONTINUED | OUTPATIENT
Start: 2022-07-22 | End: 2022-07-25 | Stop reason: HOSPADM

## 2022-07-22 RX ORDER — METOPROLOL SUCCINATE 25 MG/1
12.5 TABLET, EXTENDED RELEASE ORAL 2 TIMES DAILY
Status: DISCONTINUED | OUTPATIENT
Start: 2022-07-22 | End: 2022-07-25 | Stop reason: HOSPADM

## 2022-07-22 RX ORDER — ENOXAPARIN SODIUM 100 MG/ML
30 INJECTION SUBCUTANEOUS 2 TIMES DAILY
Status: DISCONTINUED | OUTPATIENT
Start: 2022-07-22 | End: 2022-07-25 | Stop reason: HOSPADM

## 2022-07-22 RX ORDER — SODIUM CHLORIDE 0.9 % (FLUSH) 0.9 %
5-40 SYRINGE (ML) INJECTION PRN
Status: DISCONTINUED | OUTPATIENT
Start: 2022-07-22 | End: 2022-07-25 | Stop reason: HOSPADM

## 2022-07-22 RX ORDER — IPRATROPIUM BROMIDE AND ALBUTEROL SULFATE 2.5; .5 MG/3ML; MG/3ML
1 SOLUTION RESPIRATORY (INHALATION)
Status: DISCONTINUED | OUTPATIENT
Start: 2022-07-22 | End: 2022-07-25 | Stop reason: HOSPADM

## 2022-07-22 RX ORDER — POLYETHYLENE GLYCOL 3350 17 G/17G
17 POWDER, FOR SOLUTION ORAL DAILY
Status: DISCONTINUED | OUTPATIENT
Start: 2022-07-22 | End: 2022-07-25 | Stop reason: HOSPADM

## 2022-07-22 RX ADMIN — Medication 120 MG: at 11:52

## 2022-07-22 RX ADMIN — ACETAMINOPHEN 1000 MG: 500 TABLET ORAL at 09:32

## 2022-07-22 RX ADMIN — METHOCARBAMOL 1000 MG: 500 TABLET ORAL at 13:57

## 2022-07-22 RX ADMIN — ACETAMINOPHEN 1000 MG: 500 TABLET ORAL at 21:19

## 2022-07-22 RX ADMIN — AMLODIPINE BESYLATE 5 MG: 5 TABLET ORAL at 09:32

## 2022-07-22 RX ADMIN — IPRATROPIUM BROMIDE AND ALBUTEROL SULFATE 1 AMPULE: .5; 2.5 SOLUTION RESPIRATORY (INHALATION) at 20:42

## 2022-07-22 RX ADMIN — IPRATROPIUM BROMIDE AND ALBUTEROL SULFATE 1 AMPULE: .5; 2.5 SOLUTION RESPIRATORY (INHALATION) at 16:54

## 2022-07-22 RX ADMIN — OXYCODONE HYDROCHLORIDE 10 MG: 10 TABLET ORAL at 18:24

## 2022-07-22 RX ADMIN — METOPROLOL SUCCINATE 12.5 MG: 25 TABLET, EXTENDED RELEASE ORAL at 09:32

## 2022-07-22 RX ADMIN — ENOXAPARIN SODIUM 30 MG: 100 INJECTION SUBCUTANEOUS at 09:32

## 2022-07-22 RX ADMIN — METHOCARBAMOL 1000 MG: 500 TABLET ORAL at 09:32

## 2022-07-22 RX ADMIN — IPRATROPIUM BROMIDE AND ALBUTEROL SULFATE 1 AMPULE: .5; 2.5 SOLUTION RESPIRATORY (INHALATION) at 12:41

## 2022-07-22 RX ADMIN — METHOCARBAMOL 1000 MG: 500 TABLET ORAL at 21:20

## 2022-07-22 RX ADMIN — ENOXAPARIN SODIUM 30 MG: 100 INJECTION SUBCUTANEOUS at 21:20

## 2022-07-22 RX ADMIN — ACETAMINOPHEN 1000 MG: 500 TABLET ORAL at 13:57

## 2022-07-22 RX ADMIN — METHOCARBAMOL 1000 MG: 500 TABLET ORAL at 16:55

## 2022-07-22 RX ADMIN — METOPROLOL SUCCINATE 12.5 MG: 25 TABLET, EXTENDED RELEASE ORAL at 21:20

## 2022-07-22 RX ADMIN — OXYCODONE HYDROCHLORIDE 10 MG: 10 TABLET ORAL at 09:32

## 2022-07-22 RX ADMIN — Medication 5 ML: at 09:35

## 2022-07-22 RX ADMIN — OXYCODONE HYDROCHLORIDE 10 MG: 10 TABLET ORAL at 22:47

## 2022-07-22 RX ADMIN — Medication 10 ML: at 21:20

## 2022-07-22 RX ADMIN — GABAPENTIN 200 MG: 100 CAPSULE ORAL at 21:20

## 2022-07-22 RX ADMIN — SENNOSIDES AND DOCUSATE SODIUM 1 TABLET: 50; 8.6 TABLET ORAL at 21:20

## 2022-07-22 RX ADMIN — GABAPENTIN 200 MG: 100 CAPSULE ORAL at 11:52

## 2022-07-22 ASSESSMENT — PAIN SCALES - GENERAL
PAINLEVEL_OUTOF10: 7
PAINLEVEL_OUTOF10: 9
PAINLEVEL_OUTOF10: 5
PAINLEVEL_OUTOF10: 10
PAINLEVEL_OUTOF10: 10
PAINLEVEL_OUTOF10: 6
PAINLEVEL_OUTOF10: 7
PAINLEVEL_OUTOF10: 9

## 2022-07-22 ASSESSMENT — PAIN DESCRIPTION - DESCRIPTORS
DESCRIPTORS: STABBING;DISCOMFORT;SORE
DESCRIPTORS: STABBING;DISCOMFORT;NAGGING
DESCRIPTORS: STABBING;THROBBING;DISCOMFORT
DESCRIPTORS: STABBING;DISCOMFORT;SORE
DESCRIPTORS: STABBING;DISCOMFORT;SORE
DESCRIPTORS: STABBING;SORE;DISCOMFORT
DESCRIPTORS: SHARP;STABBING;JABBING

## 2022-07-22 ASSESSMENT — PAIN DESCRIPTION - ORIENTATION
ORIENTATION: RIGHT
ORIENTATION: LEFT

## 2022-07-22 ASSESSMENT — PAIN DESCRIPTION - FREQUENCY
FREQUENCY: CONTINUOUS
FREQUENCY: INTERMITTENT
FREQUENCY: CONTINUOUS

## 2022-07-22 ASSESSMENT — PAIN - FUNCTIONAL ASSESSMENT: PAIN_FUNCTIONAL_ASSESSMENT: PREVENTS OR INTERFERES SOME ACTIVE ACTIVITIES AND ADLS

## 2022-07-22 ASSESSMENT — PAIN DESCRIPTION - LOCATION
LOCATION: RIB CAGE
LOCATION: RIB CAGE;BACK
LOCATION: RIB CAGE
LOCATION: RIB CAGE;BACK
LOCATION: RIB CAGE

## 2022-07-22 ASSESSMENT — PAIN DESCRIPTION - ONSET
ONSET: ON-GOING

## 2022-07-22 ASSESSMENT — PAIN DESCRIPTION - PAIN TYPE
TYPE: ACUTE PAIN
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
## 2022-07-22 NOTE — PROGRESS NOTES
Clinical Pharmacy Note    Pharmacy was consulted 7/22/2022 by Dr. Caty Dover to confirm the methadone dose for this patient. I called [facility] ([phone]) and spoke with Kaylyn Jean, the dosing nurse, and confirmed that his outpatient dose is 120 mg liquid methadone once a day. The last dose he received at [facility] was on 7/21/2022 at  ~0900. The methadone dose has been ordered by Dr. Rashad La this morning and is correct according to the above information. Clinical Pharmacy sign off.      Baldo Perez PharmD  7/22/2022  11:15 AM

## 2022-07-22 NOTE — PROGRESS NOTES
functional mobility   [x] ROM to improve independence with functional mobility   [x] Balance Training to improve static/dynamic balance and to reduce fall risk  [x] Endurance Training to improve activity tolerance during functional mobility   [x] Transfer Training to improve safety and independence with all functional transfers   [x] Gait Training to improve gait mechanics, endurance and assess need for appropriate assistive device  [x] Stair Training in preparation for safe discharge home and/or into the community   [x] Positioning to prevent skin breakdown and contractures  [x] Safety and Education Training   [x] Patient/Caregiver Education   [x] HEP  [] Other     PT long term treatment goals are located in above grid    Frequency of treatments: 2-5x/week x 1-2 weeks. Time in  1430  Time out  1450    Total Treatment Time  8 minutes     Evaluation Time includes thorough review of current medical information, gathering information on past medical history/social history and prior level of function, completion of standardized testing/informal observation of tasks, assessment of data and education on plan of care and goals.     CPT codes:  [x] Low Complexity PT evaluation 12631  [] Moderate Complexity PT evaluation 12673  [] High Complexity PT evaluation 76556  [] PT Re-evaluation 84633  [] Gait training 69900 0 minutes  [] Manual therapy 76696 0 minutes  [x] Therapeutic activities 51576 8 minutes  [] Therapeutic exercises 73141 0 minutes  [] Neuromuscular reeducation 77419 0 minutes       Leigh Corado PT, DPT   [id number]
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
## 2022-07-22 NOTE — PROGRESS NOTES
Pt takes methadone 120mg liquid daily. I did verify with [facility] this is the dose the pt takes. Christal Robes is not ordered. Messaged Dr Marilynn Rojas. Awaiting response .
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
## 2022-07-22 NOTE — PROGRESS NOTES
Called Ta Siu in bed assignments and  to report that patient is a DO NOT REPORT. There are conflicting stories with admitting situation.
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
## 2022-07-22 NOTE — PLAN OF CARE

## 2022-07-23 LAB
ANION GAP SERPL CALCULATED.3IONS-SCNC: 13 MMOL/L (ref 7–16)
BASOPHILS ABSOLUTE: 0.06 E9/L (ref 0–0.2)
BASOPHILS RELATIVE PERCENT: 0.9 % (ref 0–2)
BUN BLDV-MCNC: 17 MG/DL (ref 6–23)
CALCIUM SERPL-MCNC: 9 MG/DL (ref 8.6–10.2)
CHLORIDE BLD-SCNC: 104 MMOL/L (ref 98–107)
CO2: 20 MMOL/L (ref 22–29)
CREAT SERPL-MCNC: 0.8 MG/DL (ref 0.7–1.2)
EOSINOPHILS ABSOLUTE: 0.37 E9/L (ref 0.05–0.5)
EOSINOPHILS RELATIVE PERCENT: 5.5 % (ref 0–6)
GFR AFRICAN AMERICAN: >60
GFR NON-AFRICAN AMERICAN: >60 ML/MIN/1.73
GLUCOSE BLD-MCNC: 104 MG/DL (ref 74–99)
HCT VFR BLD CALC: 39.6 % (ref 37–54)
HEMOGLOBIN: 12.5 G/DL (ref 12.5–16.5)
IMMATURE GRANULOCYTES #: 0.03 E9/L
IMMATURE GRANULOCYTES %: 0.4 % (ref 0–5)
LYMPHOCYTES ABSOLUTE: 2.78 E9/L (ref 1.5–4)
LYMPHOCYTES RELATIVE PERCENT: 41 % (ref 20–42)
MCH RBC QN AUTO: 30.7 PG (ref 26–35)
MCHC RBC AUTO-ENTMCNC: 31.6 % (ref 32–34.5)
MCV RBC AUTO: 97.3 FL (ref 80–99.9)
MONOCYTES ABSOLUTE: 0.71 E9/L (ref 0.1–0.95)
MONOCYTES RELATIVE PERCENT: 10.5 % (ref 2–12)
NEUTROPHILS ABSOLUTE: 2.83 E9/L (ref 1.8–7.3)
NEUTROPHILS RELATIVE PERCENT: 41.7 % (ref 43–80)
PDW BLD-RTO: 13.7 FL (ref 11.5–15)
PLATELET # BLD: 184 E9/L (ref 130–450)
PMV BLD AUTO: 10.3 FL (ref 7–12)
POTASSIUM REFLEX MAGNESIUM: 4.1 MMOL/L (ref 3.5–5)
RBC # BLD: 4.07 E12/L (ref 3.8–5.8)
SODIUM BLD-SCNC: 137 MMOL/L (ref 132–146)
WBC # BLD: 6.8 E9/L (ref 4.5–11.5)

## 2022-07-23 PROCEDURE — 2580000003 HC RX 258: Performed by: STUDENT IN AN ORGANIZED HEALTH CARE EDUCATION/TRAINING PROGRAM

## 2022-07-23 PROCEDURE — 6370000000 HC RX 637 (ALT 250 FOR IP)

## 2022-07-23 PROCEDURE — 99232 SBSQ HOSP IP/OBS MODERATE 35: CPT | Performed by: SURGERY

## 2022-07-23 PROCEDURE — 85025 COMPLETE CBC W/AUTO DIFF WBC: CPT

## 2022-07-23 PROCEDURE — 6370000000 HC RX 637 (ALT 250 FOR IP): Performed by: STUDENT IN AN ORGANIZED HEALTH CARE EDUCATION/TRAINING PROGRAM

## 2022-07-23 PROCEDURE — 6360000002 HC RX W HCPCS: Performed by: STUDENT IN AN ORGANIZED HEALTH CARE EDUCATION/TRAINING PROGRAM

## 2022-07-23 PROCEDURE — 94640 AIRWAY INHALATION TREATMENT: CPT

## 2022-07-23 PROCEDURE — 1200000000 HC SEMI PRIVATE

## 2022-07-23 PROCEDURE — 36415 COLL VENOUS BLD VENIPUNCTURE: CPT

## 2022-07-23 PROCEDURE — 80048 BASIC METABOLIC PNL TOTAL CA: CPT

## 2022-07-23 RX ORDER — FLUTICASONE PROPIONATE 50 MCG
1 SPRAY, SUSPENSION (ML) NASAL DAILY
Status: DISCONTINUED | OUTPATIENT
Start: 2022-07-23 | End: 2022-07-25 | Stop reason: HOSPADM

## 2022-07-23 RX ADMIN — OXYCODONE HYDROCHLORIDE 10 MG: 10 TABLET ORAL at 13:35

## 2022-07-23 RX ADMIN — OXYCODONE HYDROCHLORIDE 10 MG: 10 TABLET ORAL at 17:34

## 2022-07-23 RX ADMIN — ENOXAPARIN SODIUM 30 MG: 100 INJECTION SUBCUTANEOUS at 08:59

## 2022-07-23 RX ADMIN — ACETAMINOPHEN 1000 MG: 500 TABLET ORAL at 04:50

## 2022-07-23 RX ADMIN — POLYETHYLENE GLYCOL 3350 17 G: 17 POWDER, FOR SOLUTION ORAL at 08:59

## 2022-07-23 RX ADMIN — METHOCARBAMOL 1000 MG: 500 TABLET ORAL at 13:34

## 2022-07-23 RX ADMIN — Medication 10 ML: at 20:28

## 2022-07-23 RX ADMIN — ACETAMINOPHEN 1000 MG: 500 TABLET ORAL at 13:34

## 2022-07-23 RX ADMIN — IPRATROPIUM BROMIDE AND ALBUTEROL SULFATE 1 AMPULE: .5; 2.5 SOLUTION RESPIRATORY (INHALATION) at 07:50

## 2022-07-23 RX ADMIN — METHOCARBAMOL 1000 MG: 500 TABLET ORAL at 20:28

## 2022-07-23 RX ADMIN — METOPROLOL SUCCINATE 12.5 MG: 25 TABLET, EXTENDED RELEASE ORAL at 20:27

## 2022-07-23 RX ADMIN — GABAPENTIN 200 MG: 100 CAPSULE ORAL at 20:28

## 2022-07-23 RX ADMIN — GABAPENTIN 200 MG: 100 CAPSULE ORAL at 13:34

## 2022-07-23 RX ADMIN — ACETAMINOPHEN 1000 MG: 500 TABLET ORAL at 20:27

## 2022-07-23 RX ADMIN — Medication 10 ML: at 08:59

## 2022-07-23 RX ADMIN — METHOCARBAMOL 1000 MG: 500 TABLET ORAL at 16:40

## 2022-07-23 RX ADMIN — METOPROLOL SUCCINATE 12.5 MG: 25 TABLET, EXTENDED RELEASE ORAL at 09:00

## 2022-07-23 RX ADMIN — AMLODIPINE BESYLATE 5 MG: 5 TABLET ORAL at 09:00

## 2022-07-23 RX ADMIN — SENNOSIDES AND DOCUSATE SODIUM 1 TABLET: 50; 8.6 TABLET ORAL at 08:59

## 2022-07-23 RX ADMIN — ENOXAPARIN SODIUM 30 MG: 100 INJECTION SUBCUTANEOUS at 20:28

## 2022-07-23 RX ADMIN — ONDANSETRON 4 MG: 4 TABLET, ORALLY DISINTEGRATING ORAL at 22:58

## 2022-07-23 RX ADMIN — Medication 120 MG: at 09:02

## 2022-07-23 RX ADMIN — OXYCODONE HYDROCHLORIDE 10 MG: 10 TABLET ORAL at 22:22

## 2022-07-23 RX ADMIN — IPRATROPIUM BROMIDE AND ALBUTEROL SULFATE 1 AMPULE: .5; 2.5 SOLUTION RESPIRATORY (INHALATION) at 17:23

## 2022-07-23 RX ADMIN — METHOCARBAMOL 1000 MG: 500 TABLET ORAL at 09:00

## 2022-07-23 RX ADMIN — SENNOSIDES AND DOCUSATE SODIUM 1 TABLET: 50; 8.6 TABLET ORAL at 20:28

## 2022-07-23 RX ADMIN — OXYCODONE HYDROCHLORIDE 10 MG: 10 TABLET ORAL at 04:50

## 2022-07-23 RX ADMIN — OXYCODONE HYDROCHLORIDE 10 MG: 10 TABLET ORAL at 09:01

## 2022-07-23 RX ADMIN — GABAPENTIN 200 MG: 100 CAPSULE ORAL at 08:59

## 2022-07-23 RX ADMIN — IPRATROPIUM BROMIDE AND ALBUTEROL SULFATE 1 AMPULE: .5; 2.5 SOLUTION RESPIRATORY (INHALATION) at 11:52

## 2022-07-23 ASSESSMENT — PAIN SCALES - GENERAL
PAINLEVEL_OUTOF10: 4
PAINLEVEL_OUTOF10: 10
PAINLEVEL_OUTOF10: 9
PAINLEVEL_OUTOF10: 10
PAINLEVEL_OUTOF10: 4
PAINLEVEL_OUTOF10: 9
PAINLEVEL_OUTOF10: 10
PAINLEVEL_OUTOF10: 4
PAINLEVEL_OUTOF10: 10
PAINLEVEL_OUTOF10: 4

## 2022-07-23 ASSESSMENT — PAIN DESCRIPTION - ONSET
ONSET: PROGRESSIVE
ONSET: ON-GOING
ONSET: ON-GOING

## 2022-07-23 ASSESSMENT — PAIN DESCRIPTION - DESCRIPTORS
DESCRIPTORS: SHARP;STABBING;JABBING
DESCRIPTORS: ACHING;THROBBING;PRESSURE
DESCRIPTORS: SHARP;STABBING;THROBBING
DESCRIPTORS: ACHING;DISCOMFORT;SORE;SHARP
DESCRIPTORS: ACHING;DISCOMFORT;SORE;SHARP;STABBING

## 2022-07-23 ASSESSMENT — PAIN - FUNCTIONAL ASSESSMENT

## 2022-07-23 ASSESSMENT — PAIN DESCRIPTION - FREQUENCY
FREQUENCY: CONTINUOUS

## 2022-07-23 ASSESSMENT — PAIN DESCRIPTION - LOCATION
LOCATION: HEAD
LOCATION: CHEST
LOCATION: RIB CAGE
LOCATION: RIB CAGE;ABDOMEN
LOCATION: RIB CAGE;ABDOMEN
LOCATION: RIB CAGE

## 2022-07-23 ASSESSMENT — PAIN DESCRIPTION - PAIN TYPE
TYPE: ACUTE PAIN

## 2022-07-23 ASSESSMENT — PAIN DESCRIPTION - ORIENTATION
ORIENTATION: LEFT
ORIENTATION: LEFT
ORIENTATION: RIGHT;LEFT;ANTERIOR
ORIENTATION: RIGHT;LEFT;ANTERIOR
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
## 2022-07-24 LAB
ANION GAP SERPL CALCULATED.3IONS-SCNC: 12 MMOL/L (ref 7–16)
ANISOCYTOSIS: ABNORMAL
BASOPHILS ABSOLUTE: 0.02 E9/L (ref 0–0.2)
BASOPHILS RELATIVE PERCENT: 0.4 % (ref 0–2)
BUN BLDV-MCNC: 15 MG/DL (ref 6–23)
CALCIUM SERPL-MCNC: 9.2 MG/DL (ref 8.6–10.2)
CHLORIDE BLD-SCNC: 94 MMOL/L (ref 98–107)
CO2: 25 MMOL/L (ref 22–29)
CREAT SERPL-MCNC: 0.8 MG/DL (ref 0.7–1.2)
EOSINOPHILS ABSOLUTE: 0.01 E9/L (ref 0.05–0.5)
EOSINOPHILS RELATIVE PERCENT: 0.2 % (ref 0–6)
GFR AFRICAN AMERICAN: >60
GFR NON-AFRICAN AMERICAN: >60 ML/MIN/1.73
GLUCOSE BLD-MCNC: 114 MG/DL (ref 74–99)
HCT VFR BLD CALC: 40 % (ref 37–54)
HEMOGLOBIN: 13.1 G/DL (ref 12.5–16.5)
IMMATURE GRANULOCYTES #: 0.02 E9/L
IMMATURE GRANULOCYTES %: 0.4 % (ref 0–5)
LYMPHOCYTES ABSOLUTE: 0.41 E9/L (ref 1.5–4)
LYMPHOCYTES RELATIVE PERCENT: 9.2 % (ref 20–42)
MCH RBC QN AUTO: 30.6 PG (ref 26–35)
MCHC RBC AUTO-ENTMCNC: 32.8 % (ref 32–34.5)
MCV RBC AUTO: 93.5 FL (ref 80–99.9)
MONOCYTES ABSOLUTE: 0.6 E9/L (ref 0.1–0.95)
MONOCYTES RELATIVE PERCENT: 13.5 % (ref 2–12)
NEUTROPHILS ABSOLUTE: 3.39 E9/L (ref 1.8–7.3)
NEUTROPHILS RELATIVE PERCENT: 76.3 % (ref 43–80)
PDW BLD-RTO: 13.6 FL (ref 11.5–15)
PLATELET # BLD: 177 E9/L (ref 130–450)
PMV BLD AUTO: 10 FL (ref 7–12)
POLYCHROMASIA: ABNORMAL
POTASSIUM REFLEX MAGNESIUM: 4.3 MMOL/L (ref 3.5–5)
RBC # BLD: 4.28 E12/L (ref 3.8–5.8)
SODIUM BLD-SCNC: 131 MMOL/L (ref 132–146)
URINE CULTURE, ROUTINE: NORMAL
WBC # BLD: 4.5 E9/L (ref 4.5–11.5)

## 2022-07-24 PROCEDURE — 80048 BASIC METABOLIC PNL TOTAL CA: CPT

## 2022-07-24 PROCEDURE — 85025 COMPLETE CBC W/AUTO DIFF WBC: CPT

## 2022-07-24 PROCEDURE — 6360000002 HC RX W HCPCS: Performed by: STUDENT IN AN ORGANIZED HEALTH CARE EDUCATION/TRAINING PROGRAM

## 2022-07-24 PROCEDURE — 2580000003 HC RX 258: Performed by: STUDENT IN AN ORGANIZED HEALTH CARE EDUCATION/TRAINING PROGRAM

## 2022-07-24 PROCEDURE — 6370000000 HC RX 637 (ALT 250 FOR IP): Performed by: STUDENT IN AN ORGANIZED HEALTH CARE EDUCATION/TRAINING PROGRAM

## 2022-07-24 PROCEDURE — 6360000002 HC RX W HCPCS

## 2022-07-24 PROCEDURE — 6370000000 HC RX 637 (ALT 250 FOR IP)

## 2022-07-24 PROCEDURE — 36415 COLL VENOUS BLD VENIPUNCTURE: CPT

## 2022-07-24 PROCEDURE — 94640 AIRWAY INHALATION TREATMENT: CPT

## 2022-07-24 PROCEDURE — 1200000000 HC SEMI PRIVATE

## 2022-07-24 PROCEDURE — 99232 SBSQ HOSP IP/OBS MODERATE 35: CPT | Performed by: SURGERY

## 2022-07-24 RX ORDER — OXYMETAZOLINE HYDROCHLORIDE 0.05 G/100ML
2 SPRAY NASAL 2 TIMES DAILY PRN
Status: DISCONTINUED | OUTPATIENT
Start: 2022-07-24 | End: 2022-07-25 | Stop reason: HOSPADM

## 2022-07-24 RX ORDER — KETOROLAC TROMETHAMINE 30 MG/ML
15 INJECTION, SOLUTION INTRAMUSCULAR; INTRAVENOUS ONCE
Status: COMPLETED | OUTPATIENT
Start: 2022-07-24 | End: 2022-07-24

## 2022-07-24 RX ADMIN — ACETAMINOPHEN 1000 MG: 500 TABLET ORAL at 04:27

## 2022-07-24 RX ADMIN — FLUTICASONE PROPIONATE 1 SPRAY: 50 SPRAY, METERED NASAL at 00:19

## 2022-07-24 RX ADMIN — Medication 10 ML: at 21:41

## 2022-07-24 RX ADMIN — METOPROLOL SUCCINATE 12.5 MG: 25 TABLET, EXTENDED RELEASE ORAL at 08:28

## 2022-07-24 RX ADMIN — ACETAMINOPHEN 1000 MG: 500 TABLET ORAL at 12:34

## 2022-07-24 RX ADMIN — Medication 120 MG: at 08:27

## 2022-07-24 RX ADMIN — ACETAMINOPHEN 1000 MG: 500 TABLET ORAL at 21:41

## 2022-07-24 RX ADMIN — KETOROLAC TROMETHAMINE 15 MG: 30 INJECTION, SOLUTION INTRAMUSCULAR at 04:21

## 2022-07-24 RX ADMIN — GABAPENTIN 200 MG: 100 CAPSULE ORAL at 08:27

## 2022-07-24 RX ADMIN — TRIMETHOBENZAMIDE HYDROCHLORIDE 200 MG: 100 INJECTION INTRAMUSCULAR at 20:47

## 2022-07-24 RX ADMIN — METHOCARBAMOL 1000 MG: 500 TABLET ORAL at 17:30

## 2022-07-24 RX ADMIN — OXYCODONE HYDROCHLORIDE 10 MG: 10 TABLET ORAL at 12:34

## 2022-07-24 RX ADMIN — OXYCODONE HYDROCHLORIDE 10 MG: 10 TABLET ORAL at 08:27

## 2022-07-24 RX ADMIN — TRIMETHOBENZAMIDE HYDROCHLORIDE 200 MG: 100 INJECTION INTRAMUSCULAR at 03:08

## 2022-07-24 RX ADMIN — METHOCARBAMOL 1000 MG: 500 TABLET ORAL at 12:35

## 2022-07-24 RX ADMIN — OXYCODONE HYDROCHLORIDE 10 MG: 10 TABLET ORAL at 21:41

## 2022-07-24 RX ADMIN — METHOCARBAMOL 1000 MG: 500 TABLET ORAL at 08:29

## 2022-07-24 RX ADMIN — OXYCODONE HYDROCHLORIDE 10 MG: 10 TABLET ORAL at 02:32

## 2022-07-24 RX ADMIN — AMLODIPINE BESYLATE 5 MG: 5 TABLET ORAL at 08:29

## 2022-07-24 RX ADMIN — ENOXAPARIN SODIUM 30 MG: 100 INJECTION SUBCUTANEOUS at 21:40

## 2022-07-24 RX ADMIN — TRIMETHOBENZAMIDE HYDROCHLORIDE 200 MG: 100 INJECTION INTRAMUSCULAR at 08:28

## 2022-07-24 RX ADMIN — METOPROLOL SUCCINATE 12.5 MG: 25 TABLET, EXTENDED RELEASE ORAL at 21:41

## 2022-07-24 RX ADMIN — TRIMETHOBENZAMIDE HYDROCHLORIDE 200 MG: 100 INJECTION INTRAMUSCULAR at 14:27

## 2022-07-24 RX ADMIN — GABAPENTIN 200 MG: 100 CAPSULE ORAL at 14:27

## 2022-07-24 RX ADMIN — SENNOSIDES AND DOCUSATE SODIUM 1 TABLET: 50; 8.6 TABLET ORAL at 08:29

## 2022-07-24 RX ADMIN — METHOCARBAMOL 1000 MG: 500 TABLET ORAL at 21:41

## 2022-07-24 RX ADMIN — SENNOSIDES AND DOCUSATE SODIUM 1 TABLET: 50; 8.6 TABLET ORAL at 21:41

## 2022-07-24 RX ADMIN — IPRATROPIUM BROMIDE AND ALBUTEROL SULFATE 1 AMPULE: .5; 2.5 SOLUTION RESPIRATORY (INHALATION) at 16:44

## 2022-07-24 RX ADMIN — Medication 2 SPRAY: at 21:45

## 2022-07-24 RX ADMIN — OXYCODONE HYDROCHLORIDE 10 MG: 10 TABLET ORAL at 17:33

## 2022-07-24 RX ADMIN — ENOXAPARIN SODIUM 30 MG: 100 INJECTION SUBCUTANEOUS at 08:50

## 2022-07-24 RX ADMIN — IPRATROPIUM BROMIDE AND ALBUTEROL SULFATE 1 AMPULE: .5; 2.5 SOLUTION RESPIRATORY (INHALATION) at 20:40

## 2022-07-24 RX ADMIN — ONDANSETRON 4 MG: 4 TABLET, ORALLY DISINTEGRATING ORAL at 17:29

## 2022-07-24 RX ADMIN — GABAPENTIN 200 MG: 100 CAPSULE ORAL at 21:41

## 2022-07-24 ASSESSMENT — PAIN SCALES - GENERAL
PAINLEVEL_OUTOF10: 10
PAINLEVEL_OUTOF10: 9
PAINLEVEL_OUTOF10: 10
PAINLEVEL_OUTOF10: 9
PAINLEVEL_OUTOF10: 10
PAINLEVEL_OUTOF10: 6
PAINLEVEL_OUTOF10: 10
PAINLEVEL_OUTOF10: 10
PAINLEVEL_OUTOF10: 9
PAINLEVEL_OUTOF10: 10
PAINLEVEL_OUTOF10: 7

## 2022-07-24 ASSESSMENT — PAIN DESCRIPTION - PAIN TYPE
TYPE: ACUTE PAIN

## 2022-07-24 ASSESSMENT — PAIN DESCRIPTION - DESCRIPTORS
DESCRIPTORS: ACHING;THROBBING;PRESSURE
DESCRIPTORS: ACHING;DISCOMFORT;SORE;SHARP
DESCRIPTORS: ACHING;DISCOMFORT;GNAWING
DESCRIPTORS: ACHING;PRESSURE;THROBBING
DESCRIPTORS: ACHING;DISCOMFORT;SHARP;SORE

## 2022-07-24 ASSESSMENT — PAIN DESCRIPTION - LOCATION
LOCATION: HEAD
LOCATION: HEAD;CHEST;RIB CAGE
LOCATION: CHEST;RIB CAGE;ABDOMEN
LOCATION: HEAD
LOCATION: HEAD
LOCATION: CHEST;RIB CAGE

## 2022-07-24 ASSESSMENT — PAIN DESCRIPTION - FREQUENCY
FREQUENCY: CONTINUOUS

## 2022-07-24 ASSESSMENT — PAIN DESCRIPTION - ONSET
ONSET: ON-GOING

## 2022-07-24 ASSESSMENT — PAIN - FUNCTIONAL ASSESSMENT
PAIN_FUNCTIONAL_ASSESSMENT: PREVENTS OR INTERFERES SOME ACTIVE ACTIVITIES AND ADLS
PAIN_FUNCTIONAL_ASSESSMENT: ACTIVITIES ARE NOT PREVENTED
PAIN_FUNCTIONAL_ASSESSMENT: PREVENTS OR INTERFERES SOME ACTIVE ACTIVITIES AND ADLS
PAIN_FUNCTIONAL_ASSESSMENT: PREVENTS OR INTERFERES SOME ACTIVE ACTIVITIES AND ADLS

## 2022-07-24 ASSESSMENT — PAIN DESCRIPTION - ORIENTATION
ORIENTATION: RIGHT;LEFT;ANTERIOR
ORIENTATION: RIGHT;LEFT;ANTERIOR
ORIENTATION: ANTERIOR;LEFT;RIGHT
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
## 2022-07-24 NOTE — PLAN OF CARE
Problem: Chronic Conditions and Co-morbidities  Goal: Patient's chronic conditions and co-morbidity symptoms are monitored and maintained or improved  7/24/2022 1352 by Sinda Cockayne, RN  Outcome: Progressing     Problem: Discharge Planning  Goal: Discharge to home or other facility with appropriate resources  7/24/2022 1352 by Sinda Cockayne, RN  Outcome: Progressing     Problem: Pain  Goal: Verbalizes/displays adequate comfort level or baseline comfort level  7/24/2022 1352 by Sinda Cockayne, RN  Outcome: Progressing     Problem: Skin/Tissue Integrity  Goal: Absence of new skin breakdown  Description: 1. Monitor for areas of redness and/or skin breakdown  2. Assess vascular access sites hourly  3. Every 4-6 hours minimum:  Change oxygen saturation probe site  4. Every 4-6 hours:  If on nasal continuous positive airway pressure, respiratory therapy assess nares and determine need for appliance change or resting period.   7/24/2022 1352 by Sinda Cockayne, RN  Outcome: Progressing     Problem: Safety - Adult  Goal: Free from fall injury  7/24/2022 1352 by Sinda Cockayne, RN  Outcome: Progressing     Problem: ABCDS Injury Assessment  Goal: Absence of physical injury  7/24/2022 1352 by Sinda Cockayne, RN  Outcome: Progressing

## 2022-07-24 NOTE — PROGRESS NOTES
Surgery team notified that patient is complaining of severe headache and is now having episodes of vomiting. Additional nausea medications ordered and resident to come evaluate patient again.

## 2022-07-24 NOTE — PROGRESS NOTES
Surgery team notified that patient is getting increasingly more anxious and restless, stating he is \"tweaking out\" and is disoriented to time. Resident up to see patient.

## 2022-07-24 NOTE — PROGRESS NOTES
He is complaining of a severe headache and is restlerss in the bed and having arm tremors at times . he vomited up all his morning meds shortly after I gave them . Is there anything he could have please and thank you .  Message sent to Dr Tricia Reyes

## 2022-07-25 VITALS
BODY MASS INDEX: 24.38 KG/M2 | HEART RATE: 63 BPM | DIASTOLIC BLOOD PRESSURE: 78 MMHG | OXYGEN SATURATION: 94 % | HEIGHT: 72 IN | TEMPERATURE: 97.3 F | SYSTOLIC BLOOD PRESSURE: 105 MMHG | WEIGHT: 180 LBS | RESPIRATION RATE: 16 BRPM

## 2022-07-25 LAB
ANION GAP SERPL CALCULATED.3IONS-SCNC: 13 MMOL/L (ref 7–16)
BASOPHILS ABSOLUTE: 0.01 E9/L (ref 0–0.2)
BASOPHILS RELATIVE PERCENT: 0.2 % (ref 0–2)
BUN BLDV-MCNC: 20 MG/DL (ref 6–23)
CALCIUM SERPL-MCNC: 9.1 MG/DL (ref 8.6–10.2)
CHLORIDE BLD-SCNC: 95 MMOL/L (ref 98–107)
CO2: 27 MMOL/L (ref 22–29)
CREAT SERPL-MCNC: 0.8 MG/DL (ref 0.7–1.2)
EOSINOPHILS ABSOLUTE: 0 E9/L (ref 0.05–0.5)
EOSINOPHILS RELATIVE PERCENT: 0 % (ref 0–6)
GFR AFRICAN AMERICAN: >60
GFR NON-AFRICAN AMERICAN: >60 ML/MIN/1.73
GLUCOSE BLD-MCNC: 96 MG/DL (ref 74–99)
HCT VFR BLD CALC: 42.8 % (ref 37–54)
HEMOGLOBIN: 14.3 G/DL (ref 12.5–16.5)
IMMATURE GRANULOCYTES #: 0.02 E9/L
IMMATURE GRANULOCYTES %: 0.5 % (ref 0–5)
LYMPHOCYTES ABSOLUTE: 1.06 E9/L (ref 1.5–4)
LYMPHOCYTES RELATIVE PERCENT: 23.9 % (ref 20–42)
MCH RBC QN AUTO: 31 PG (ref 26–35)
MCHC RBC AUTO-ENTMCNC: 33.4 % (ref 32–34.5)
MCV RBC AUTO: 92.8 FL (ref 80–99.9)
MONOCYTES ABSOLUTE: 0.8 E9/L (ref 0.1–0.95)
MONOCYTES RELATIVE PERCENT: 18.1 % (ref 2–12)
NEUTROPHILS ABSOLUTE: 2.54 E9/L (ref 1.8–7.3)
NEUTROPHILS RELATIVE PERCENT: 57.3 % (ref 43–80)
PDW BLD-RTO: 13.6 FL (ref 11.5–15)
PLATELET # BLD: 182 E9/L (ref 130–450)
PMV BLD AUTO: 10.1 FL (ref 7–12)
POTASSIUM REFLEX MAGNESIUM: 3.7 MMOL/L (ref 3.5–5)
RBC # BLD: 4.61 E12/L (ref 3.8–5.8)
SODIUM BLD-SCNC: 135 MMOL/L (ref 132–146)
WBC # BLD: 4.4 E9/L (ref 4.5–11.5)

## 2022-07-25 PROCEDURE — 6370000000 HC RX 637 (ALT 250 FOR IP): Performed by: STUDENT IN AN ORGANIZED HEALTH CARE EDUCATION/TRAINING PROGRAM

## 2022-07-25 PROCEDURE — 6360000002 HC RX W HCPCS: Performed by: STUDENT IN AN ORGANIZED HEALTH CARE EDUCATION/TRAINING PROGRAM

## 2022-07-25 PROCEDURE — 2580000003 HC RX 258: Performed by: STUDENT IN AN ORGANIZED HEALTH CARE EDUCATION/TRAINING PROGRAM

## 2022-07-25 PROCEDURE — 6370000000 HC RX 637 (ALT 250 FOR IP)

## 2022-07-25 PROCEDURE — 99238 HOSP IP/OBS DSCHRG MGMT 30/<: CPT | Performed by: SURGERY

## 2022-07-25 PROCEDURE — 80048 BASIC METABOLIC PNL TOTAL CA: CPT

## 2022-07-25 PROCEDURE — 85025 COMPLETE CBC W/AUTO DIFF WBC: CPT

## 2022-07-25 PROCEDURE — 2580000003 HC RX 258

## 2022-07-25 PROCEDURE — 36415 COLL VENOUS BLD VENIPUNCTURE: CPT

## 2022-07-25 PROCEDURE — 2500000003 HC RX 250 WO HCPCS

## 2022-07-25 PROCEDURE — 94640 AIRWAY INHALATION TREATMENT: CPT

## 2022-07-25 RX ORDER — ONDANSETRON 4 MG/1
4 TABLET, ORALLY DISINTEGRATING ORAL EVERY 8 HOURS PRN
Qty: 10 TABLET | Refills: 0 | Status: SHIPPED | OUTPATIENT
Start: 2022-07-25

## 2022-07-25 RX ORDER — OXYCODONE HYDROCHLORIDE 10 MG/1
10 TABLET ORAL EVERY 8 HOURS PRN
Qty: 21 TABLET | Refills: 0 | Status: SHIPPED | OUTPATIENT
Start: 2022-07-25 | End: 2022-08-01

## 2022-07-25 RX ORDER — METHOCARBAMOL 500 MG/1
1000 TABLET, FILM COATED ORAL 4 TIMES DAILY
Qty: 80 TABLET | Refills: 0
Start: 2022-07-25 | End: 2022-08-04

## 2022-07-25 RX ORDER — LIDOCAINE 4 G/G
1 PATCH TOPICAL DAILY
Qty: 1 BOX | Refills: 0
Start: 2022-07-26

## 2022-07-25 RX ADMIN — Medication 120 MG: at 08:21

## 2022-07-25 RX ADMIN — GABAPENTIN 200 MG: 100 CAPSULE ORAL at 08:21

## 2022-07-25 RX ADMIN — AMLODIPINE BESYLATE 5 MG: 5 TABLET ORAL at 08:21

## 2022-07-25 RX ADMIN — ACETAMINOPHEN 1000 MG: 500 TABLET ORAL at 13:58

## 2022-07-25 RX ADMIN — ENOXAPARIN SODIUM 30 MG: 100 INJECTION SUBCUTANEOUS at 08:20

## 2022-07-25 RX ADMIN — POTASSIUM PHOSPHATE, MONOBASIC AND POTASSIUM PHOSPHATE, DIBASIC 10 MMOL: 224; 236 INJECTION, SOLUTION, CONCENTRATE INTRAVENOUS at 10:18

## 2022-07-25 RX ADMIN — OXYCODONE HYDROCHLORIDE 10 MG: 10 TABLET ORAL at 06:05

## 2022-07-25 RX ADMIN — OXYCODONE HYDROCHLORIDE 10 MG: 10 TABLET ORAL at 13:13

## 2022-07-25 RX ADMIN — METHOCARBAMOL 1000 MG: 500 TABLET ORAL at 08:22

## 2022-07-25 RX ADMIN — Medication 10 ML: at 08:23

## 2022-07-25 RX ADMIN — ACETAMINOPHEN 1000 MG: 500 TABLET ORAL at 06:05

## 2022-07-25 RX ADMIN — GABAPENTIN 200 MG: 100 CAPSULE ORAL at 13:59

## 2022-07-25 RX ADMIN — METOPROLOL SUCCINATE 12.5 MG: 25 TABLET, EXTENDED RELEASE ORAL at 08:22

## 2022-07-25 RX ADMIN — POTASSIUM BICARBONATE 20 MEQ: 782 TABLET, EFFERVESCENT ORAL at 10:16

## 2022-07-25 RX ADMIN — METHOCARBAMOL 1000 MG: 500 TABLET ORAL at 13:12

## 2022-07-25 RX ADMIN — SODIUM CHLORIDE, PRESERVATIVE FREE 10 ML: 5 INJECTION INTRAVENOUS at 10:17

## 2022-07-25 RX ADMIN — IPRATROPIUM BROMIDE AND ALBUTEROL SULFATE 1 AMPULE: .5; 2.5 SOLUTION RESPIRATORY (INHALATION) at 11:27

## 2022-07-25 RX ADMIN — ONDANSETRON HYDROCHLORIDE 4 MG: 2 SOLUTION INTRAMUSCULAR; INTRAVENOUS at 06:03

## 2022-07-25 ASSESSMENT — PAIN DESCRIPTION - DESCRIPTORS
DESCRIPTORS: ACHING;DISCOMFORT;SORE
DESCRIPTORS: DISCOMFORT;ACHING;SORE
DESCRIPTORS: ACHING;SORE;SHARP
DESCRIPTORS: ACHING;DISCOMFORT;GNAWING

## 2022-07-25 ASSESSMENT — PAIN SCALES - GENERAL
PAINLEVEL_OUTOF10: 7
PAINLEVEL_OUTOF10: 6
PAINLEVEL_OUTOF10: 7
PAINLEVEL_OUTOF10: 7

## 2022-07-25 ASSESSMENT — PAIN DESCRIPTION - PAIN TYPE
TYPE: ACUTE PAIN

## 2022-07-25 ASSESSMENT — PAIN DESCRIPTION - FREQUENCY
FREQUENCY: CONTINUOUS

## 2022-07-25 ASSESSMENT — PAIN DESCRIPTION - LOCATION
LOCATION: CHEST;HEAD
LOCATION: HEAD
LOCATION: RIB CAGE
LOCATION: HEAD

## 2022-07-25 ASSESSMENT — PAIN DESCRIPTION - ONSET
ONSET: ON-GOING

## 2022-07-25 ASSESSMENT — PAIN - FUNCTIONAL ASSESSMENT: PAIN_FUNCTIONAL_ASSESSMENT: ACTIVITIES ARE NOT PREVENTED

## 2022-07-25 NOTE — PLAN OF CARE
Problem: Chronic Conditions and Co-morbidities  Goal: Patient's chronic conditions and co-morbidity symptoms are monitored and maintained or improved  7/25/2022 1335 by Martha Anderson RN  Outcome: Completed  7/25/2022 0029 by Umberto Hinkle RN  Outcome: Progressing     Problem: Discharge Planning  Goal: Discharge to home or other facility with appropriate resources  7/25/2022 1335 by Martha Anderson RN  Outcome: Completed  7/25/2022 0029 by Umberto Hinkle RN  Outcome: Progressing     Problem: Pain  Goal: Verbalizes/displays adequate comfort level or baseline comfort level  7/25/2022 1335 by Martha Anderson RN  Outcome: Completed  7/25/2022 0029 by Umberto Hinkle RN  Outcome: Progressing     Problem: Skin/Tissue Integrity  Goal: Absence of new skin breakdown  Description: 1. Monitor for areas of redness and/or skin breakdown  2. Assess vascular access sites hourly  3. Every 4-6 hours minimum:  Change oxygen saturation probe site  4. Every 4-6 hours:  If on nasal continuous positive airway pressure, respiratory therapy assess nares and determine need for appliance change or resting period.   7/25/2022 1335 by Martha Anderson RN  Outcome: Completed  7/25/2022 0029 by Umberto Hinkle RN  Outcome: Progressing     Problem: Safety - Adult  Goal: Free from fall injury  7/25/2022 1335 by Martha Anderson RN  Outcome: Completed  7/25/2022 0029 by Umberto Hinkle RN  Outcome: Progressing     Problem: ABCDS Injury Assessment  Goal: Absence of physical injury  7/25/2022 1335 by Martha Anderson RN  Outcome: Completed  7/25/2022 0029 by Umberto Hinkle RN  Outcome: Progressing

## 2022-07-25 NOTE — DISCHARGE INSTRUCTIONS
RIB FRACTURE DISCHARGE INSTRUCTIONS    Your ribs are curved bones in your chest that protect inner structures like your lungs and heart. The ribs expand and contract when you breathe. Pain can result making it hard to cough or breathe deeply. The difficulty increases if several ribs are broken on both sides. You are likely to heal in 6 to 8 weeks, but may take longer if you are elderly. Most rib fractures heal on their own with no lasting effects. Treatment:   Take the medications given to you as prescribed. Your pain may not go completely away after discharge from the hospital, but we want your pain tolerable so you can take deep breaths. As your pain becomes controlled you may switch to taking over-the-counter medications such as Tylenol and or Ibuprofen as directed. Tylenol (acetaminophen) 1000mg every 6 hours or you may take 650mg every 4 hours. Ibuprofen up to 800mg every 8 hours. (Please take with food or milk). Over the counter creams and patches such as Salon Pas, [employer], Aspercream, can be useful as well. You were likely given a breathing device called an incentive spirometer while in the hospital to practice deep breathing. It is advised to continue this several times a day while at home to prevent pneumonia. Prevent Complications:  Try to take 5-10 deep breaths every hour while awake. Use the incentive spirometer often. Set goals of deeper breathing every couple of days until reaching normal adult level of about 2500 ml on the printed scale. Activity:  Avoid further chest injury. Plan quiet activity for the first few days. Do not stay in bed. Walk around and move. No rough activities with family, friends or pets. No sports, jumping, jogging or gymnastics. Ask your doctor or the trauma clinic when you will be able to resume these activities.     Symptoms to Report:  Call your doctor right away if you notice any of these symptoms:   Increased chest pain  Shortness of the amount of alcohol you drink. Alcohol can impair your balance and other senses. Ask your doctor whether calluses or corns on your feet need to be removed. If you wear loose-fitting shoes because of calluses or corns, you can lose your balance and fall. Talk to your doctor if you have numbness in your feet. Preventing falls at home  Remove raised doorway thresholds, throw rugs, and clutter. Repair loose carpet or raised areas in the floor. Move furniture and electrical cords to keep them out of walking paths. Use non-skid floor wax, and wipe up spills right away, especially on ceramic tile floors. If you use a walker or cane, put rubber tips on it. If you use crutches, clean the bottoms of them regularly with an abrasive pad, such as steel wool. Keep your house well lit, especially stairways, porches, and outside walkways. Use night-lights in areas such as hallways and washrooms. Add extra light switches or use remote switches (such as switches that go on or off when you clap your hands) to make it easier to turn lights on if you have to get up during the night. Install sturdy handrails on stairways. Move items in your cabinets so that the things you use a lot are on the lower shelves (about waist level). Keep a cordless phone and a flashlight with new batteries by your bed. If possible, put a phone in each of the main rooms of your house, or carry a cell phone in case you fall and cannot reach a phone. Or, you can wear a device around your neck or wrist. You push a button that sends a signal for help. Wear low-heeled shoes that fit well and give your feet good support. Use footwear with non-skid soles. Check the heels and soles of your shoes for wear. Repair or replace worn heels or soles. Do not wear socks without shoes on wood floors. Walk on the grass when the sidewalks are slippery.  If you live in an area that gets snow and ice in the winter, sprinkle salt on slippery steps and sidewalks. Preventing falls in the bath  Install grab bars and non-skid mats inside and outside your shower or tub and near the toilet and sinks. Use shower chairs and bath benches. Use a hand-held shower head that will allow you to sit while showering. Get into a tub or shower by putting the weaker leg in first. Get out of a tub or shower with your strong side first.  Repair loose toilet seats and consider installing a raised toilet seat to make getting on and off the toilet easier. Keep your washroom door unlocked while you are in the shower. Follow-up:  Trauma Clinic: [phone] option 2  [address], [address]    Call [phone], option 2, for any questions/concerns and for follow-up appointment in 2 week(s). Please follow the instructions checked below:  Please follow-up with your primary care provider. ACTIVITY INSTRUCTIONS  Increase activity as tolerated  No heavy lifting or strenuous activity  Take your incentive spirometer home and use 4-6 times/day   [x]  No driving until cleared by Trauma Services    WOUND/DRESSING INSTRUCTIONS:  You may shower. No sitting in bath tub, hot tub or swimming until cleared by physician. Ice to areas of pain for first 24 hours. Heat to areas of pain after that. Wash areas of lacerations/abrasions with soap & water. Rinse well. Pat dry with clean towel. Apply thin layer of Bacitracin, Neosporin, or triple antibiotic cream to affected area 2-3 times per day. Keep wounds clean and dry. MEDICATION INSTRUCTIONS  Take medication as prescribed. When taking pain medications, you may experience dizziness or drowsiness. Do not drink alcohol or drive when taking these medications. You may experience constipation while taking pain medication.   You may take over the counter stool softeners such as docusate (Colace), sennosides S (Senokot-S),

## 2022-07-25 NOTE — PROGRESS NOTES
Called and spoke with nurse at [facility] regarding transport for the pt. Stated would call and let me know a time, approx would be [phone].

## 2022-07-25 NOTE — PROGRESS NOTES
Called [facility] regarding transport. This RN was transferred to voice mail. 1529: [facility] just called and they are on the way to  pt. Transport placed.
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
## 2022-08-01 ASSESSMENT — ENCOUNTER SYMPTOMS
RESPIRATORY NEGATIVE: 1
COUGH: 0
ALLERGIC/IMMUNOLOGIC NEGATIVE: 1
GASTROINTESTINAL NEGATIVE: 1
ABDOMINAL DISTENTION: 0
BACK PAIN: 1
DIARRHEA: 0
VOMITING: 0
SHORTNESS OF BREATH: 0
BLOOD IN STOOL: 0
ABDOMINAL PAIN: 0
NAUSEA: 0
CONSTIPATION: 0
EYES NEGATIVE: 1
ANAL BLEEDING: 0

## 2024-03-12 ENCOUNTER — APPOINTMENT (OUTPATIENT)
Dept: GENERAL RADIOLOGY | Age: 69
End: 2024-03-12
Payer: OTHER GOVERNMENT

## 2024-03-12 ENCOUNTER — HOSPITAL ENCOUNTER (EMERGENCY)
Age: 69
Discharge: HOME | End: 2024-03-12
Attending: EMERGENCY MEDICINE
Payer: OTHER GOVERNMENT

## 2024-03-12 ENCOUNTER — APPOINTMENT (OUTPATIENT)
Dept: CT IMAGING | Age: 69
End: 2024-03-12
Payer: OTHER GOVERNMENT

## 2024-03-12 VITALS
RESPIRATION RATE: 18 BRPM | DIASTOLIC BLOOD PRESSURE: 81 MMHG | SYSTOLIC BLOOD PRESSURE: 134 MMHG | BODY MASS INDEX: 24.65 KG/M2 | TEMPERATURE: 97.7 F | HEIGHT: 72 IN | OXYGEN SATURATION: 93 % | WEIGHT: 182 LBS | HEART RATE: 72 BPM

## 2024-03-12 DIAGNOSIS — S09.90XA CLOSED HEAD INJURY, INITIAL ENCOUNTER: ICD-10-CM

## 2024-03-12 DIAGNOSIS — Z87.898 HISTORY OF ALCOHOL USE: Primary | ICD-10-CM
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
## 2024-03-12 DIAGNOSIS — F11.91 HISTORY OF HEROIN USE: ICD-10-CM

## 2024-03-12 LAB
ALBUMIN SERPL-MCNC: 4.3 G/DL (ref 3.5–5.2)
ALP SERPL-CCNC: 109 U/L (ref 40–129)
ALT SERPL-CCNC: 9 U/L (ref 0–40)
AMMONIA PLAS-SCNC: 28 UMOL/L (ref 16–60)
AMPHET UR QL SCN: NEGATIVE
ANION GAP SERPL CALCULATED.3IONS-SCNC: 14 MMOL/L (ref 7–16)
APAP SERPL-MCNC: <5 UG/ML (ref 10–30)
AST SERPL-CCNC: 18 U/L (ref 0–39)
BARBITURATES UR QL SCN: NEGATIVE
BASOPHILS # BLD: 0.05 K/UL (ref 0–0.2)
BASOPHILS NFR BLD: 1 % (ref 0–2)
BENZODIAZ UR QL: POSITIVE
BILIRUB SERPL-MCNC: 0.2 MG/DL (ref 0–1.2)
BUN SERPL-MCNC: 23 MG/DL (ref 6–23)
BUPRENORPHINE UR QL: NEGATIVE
CALCIUM SERPL-MCNC: 9.6 MG/DL (ref 8.6–10.2)
CANNABINOIDS UR QL SCN: NEGATIVE
CHLORIDE SERPL-SCNC: 99 MMOL/L (ref 98–107)
CO2 SERPL-SCNC: 22 MMOL/L (ref 22–29)
COCAINE UR QL SCN: NEGATIVE
CREAT SERPL-MCNC: 1.2 MG/DL (ref 0.7–1.2)
EOSINOPHIL # BLD: 0.13 K/UL (ref 0.05–0.5)
EOSINOPHILS RELATIVE PERCENT: 2 % (ref 0–6)
ERYTHROCYTE [DISTWIDTH] IN BLOOD BY AUTOMATED COUNT: 12.8 % (ref 11.5–15)
ETHANOLAMINE SERPL-MCNC: <10 MG/DL
FENTANYL UR QL: NEGATIVE
GFR SERPL CREATININE-BSD FRML MDRD: >60 ML/MIN/1.73M2
GLUCOSE SERPL-MCNC: 137 MG/DL (ref 74–99)
HCT VFR BLD AUTO: 44.2 % (ref 37–54)
HGB BLD-MCNC: 14 G/DL (ref 12.5–16.5)
IMM GRANULOCYTES # BLD AUTO: 0.03 K/UL (ref 0–0.58)
IMM GRANULOCYTES NFR BLD: 0 % (ref 0–5)
LYMPHOCYTES NFR BLD: 1.84 K/UL (ref 1.5–4)
LYMPHOCYTES RELATIVE PERCENT: 27 % (ref 20–42)
MAGNESIUM SERPL-MCNC: 2.1 MG/DL (ref 1.6–2.6)
MCH RBC QN AUTO: 30.7 PG (ref 26–35)
MCHC RBC AUTO-ENTMCNC: 31.7 G/DL (ref 32–34.5)
MCV RBC AUTO: 96.9 FL (ref 80–99.9)
METHADONE UR QL: POSITIVE
MONOCYTES NFR BLD: 0.55 K/UL (ref 0.1–0.95)
MONOCYTES NFR BLD: 8 % (ref 2–12)
NEUTROPHILS NFR BLD: 62 % (ref 43–80)
NEUTS SEG NFR BLD: 4.13 K/UL (ref 1.8–7.3)
OPIATES UR QL SCN: NEGATIVE
OXYCODONE UR QL SCN: NEGATIVE
PCP UR QL SCN: NEGATIVE
PLATELET # BLD AUTO: 252 K/UL (ref 130–450)
PMV BLD AUTO: 9.5 FL (ref 7–12)
POTASSIUM SERPL-SCNC: 4.8 MMOL/L (ref 3.5–5)
PROT SERPL-MCNC: 8.5 G/DL (ref 6.4–8.3)
RBC # BLD AUTO: 4.56 M/UL (ref 3.8–5.8)
SALICYLATES SERPL-MCNC: <0.3 MG/DL (ref 0–30)
SODIUM SERPL-SCNC: 135 MMOL/L (ref 132–146)
TEST INFORMATION: ABNORMAL
TOXIC TRICYCLIC SC,BLOOD: NEGATIVE
WBC OTHER # BLD: 6.7 K/UL (ref 4.5–11.5)

## 2024-03-12 PROCEDURE — 80307 DRUG TEST PRSMV CHEM ANLYZR: CPT

## 2024-03-12 PROCEDURE — 80053 COMPREHEN METABOLIC PANEL: CPT

## 2024-03-12 PROCEDURE — 71045 X-RAY EXAM CHEST 1 VIEW: CPT

## 2024-03-12 PROCEDURE — 93005 ELECTROCARDIOGRAM TRACING: CPT | Performed by: STUDENT IN AN ORGANIZED HEALTH CARE EDUCATION/TRAINING PROGRAM

## 2024-03-12 PROCEDURE — 85025 COMPLETE CBC W/AUTO DIFF WBC: CPT

## 2024-03-12 PROCEDURE — 82140 ASSAY OF AMMONIA: CPT

## 2024-03-12 PROCEDURE — 70450 CT HEAD/BRAIN W/O DYE: CPT

## 2024-03-12 PROCEDURE — G0480 DRUG TEST DEF 1-7 CLASSES: HCPCS

## 2024-03-12 PROCEDURE — 80179 DRUG ASSAY SALICYLATE: CPT

## 2024-03-12 PROCEDURE — 80143 DRUG ASSAY ACETAMINOPHEN: CPT

## 2024-03-12 PROCEDURE — 83735 ASSAY OF MAGNESIUM: CPT

## 2024-03-12 PROCEDURE — 99285 EMERGENCY DEPT VISIT HI MDM: CPT

## 2024-03-12 PROCEDURE — 96372 THER/PROPH/DIAG INJ SC/IM: CPT

## 2024-03-12 PROCEDURE — 72125 CT NECK SPINE W/O DYE: CPT

## 2024-03-12 PROCEDURE — 6360000002 HC RX W HCPCS: Performed by: EMERGENCY MEDICINE

## 2024-03-12 RX ORDER — LORAZEPAM 2 MG/ML
2 INJECTION INTRAMUSCULAR ONCE
Status: COMPLETED | OUTPATIENT
Start: 2024-03-12 | End: 2024-03-12

## 2024-03-12 RX ORDER — ZIPRASIDONE MESYLATE 20 MG/ML
10 INJECTION, POWDER, LYOPHILIZED, FOR SOLUTION INTRAMUSCULAR ONCE
Status: COMPLETED | OUTPATIENT
Start: 2024-03-12 | End: 2024-03-12

## 2024-03-12 RX ADMIN — LORAZEPAM 2 MG: 2 INJECTION INTRAMUSCULAR; INTRAVENOUS at 10:48

## 2024-03-12 RX ADMIN — ZIPRASIDONE MESYLATE 10 MG: 20 INJECTION, POWDER, LYOPHILIZED, FOR SOLUTION INTRAMUSCULAR at 10:48

## 2024-03-12 ASSESSMENT — LIFESTYLE VARIABLES: HOW OFTEN DO YOU HAVE A DRINK CONTAINING ALCOHOL: NEVER

## 2024-03-12 ASSESSMENT — PAIN - FUNCTIONAL ASSESSMENT: PAIN_FUNCTIONAL_ASSESSMENT: NONE - DENIES PAIN

## 2024-03-12 NOTE — ED PROVIDER NOTES
seen within the cervical spine with no acute   abnormality of the cervical spine.           No results found.    No results found.    ------------------- NURSING NOTES & VITALS -------------------    The nursing notes within the ED encounter and vital signs were reviewed.     Vitals:    03/12/24 1403   BP: (!) 125/59   Pulse: 62   Resp: 12   Temp:    SpO2: 92%        Patient Vitals for the past 8 hrs:   BP Temp Pulse Resp SpO2 Height Weight   03/12/24 1403 (!) 125/59 -- 62 12 92 % -- --   03/12/24 1009 132/78 98.2 °F (36.8 °C) 84 16 94 % 1.829 m (6') 82.6 kg (182 lb)         ------------- Final Impression, Disposition & Plan ---------------    Final Impression:   1. Transient alteration of awareness    2. History of alcohol use    3. History of heroin use    4. Fall, initial encounter    5. Closed head injury, initial encounter        Disposition:   1. Discharge home, stable    PATIENT REFERRED TO:  No follow-up provider specified.    DISCHARGE MEDICATIONS:  New Prescriptions    No medications on file            Please note that portions of this note were completed with a voice recognition program.    Efforts were made to edit the dictations but occasionally words are mis-transcribed.    Breezy Domínguez DO (electronically signed)       Sharda Tovar MD  03/15/24 9133

## 2024-03-12 NOTE — CARE COORDINATION
Social Work /Transition of Care:    Pt presents to the ED secondary to confusion and multiple falls at home.  Pt was sent to the ED from [facility].  CARMENZA spoke to Kush at [facility], who reports pt was confused and reported multiple falls at home and also hit his head.  Kush reports pt was meeting with his counselor and appeared very confused, which is not normal for him.  Pt is pink slipped by ED physician.

## 2024-03-12 NOTE — PROGRESS NOTES
Assistance to the restroom provided and urine sample obtained and placed at bedside.  Pt undressed and placed in hospital attire

## 2024-03-12 NOTE — ED NOTES
[facility]  [facility] CALL[facility] AND SAID THAT SHE IS NOT SURE IF THEY HAVE PSYCH BEDS - AWAITING CALL BACK   
AMRIK ADVISED THAT THERE ARE NO PSYCH BEDS AT THIS TIME - PT WILL BE REVIEWED FOR 7W  
Call from danna Sanchez-she stated that she is Krish's primary counselor over at [facility].  She stated that his vehicle was still in the parking lot.  Informed her that the patient is going to get a taxi cab home and it is not a good idea for him to drive today.  She stated that his vehicle will be secure in the parking lot.  
Christopher NP/ Dr. MARTINA Marcos declined patient due to his needing a higher level of care.  
Discharge instructions reviewed with patient and he denies any questions.    
G RN will review for admission to  if the [facility] no [facility].    [facility] and [facility] Blaine - faxed chart to [phone]    Awaiting call back   
Locker number 26 clothing secured   
Patient presented to Christopher DAMON who will discuss with with [facility] medical director an call back with disposition.   
Patient was [facility] to [facility].  
Pt in gown and pants  
Spoke to the patient's son Yang and informed him that the patient is going to be discharged home.  He stated that he is out of town and unable to  the patient.  The patient stated that he has money for a taxi cab.  Spoke to the ED doctor about the patient taking a taxi cab home and he stated that he is fine with that.  Also informed the doctor that the patient was taking Benadryl per his son and that is why he fell.  The son stated he should not be taking Benadryl with methadone.  The emergency room doctor also reported that the patient should not be taking Benadryl.  
The patient stated he does not understand why he was made to stay here.  He was oriented x 3 and is now lucid.  Although he does not remember why he was medicated earlier and pink slipped, he does recall falling at his house earlier after he tripped over a lamp cord and going to his therapist at [place].  He stated that she sent him and when he reported to her that he had a fall.  He stated that his wife has cirrhosis of the liver and he is her caretaker and needs to be home.    
and risk factors with RN and ED Physician.    Consulted with ED Physician, DR. REYES. Disposition/level of care recommended at this time:  [] Home:   [] Outpatient Provider:   [] Crisis Unit:   [x] Inpatient Psychiatric Unit:  ED DOC WANTS PT TO BE EVALUATED FOR SAFETY AND STABILITY  [] Other:

## 2024-03-13 LAB
EKG ATRIAL RATE: 77 BPM
EKG P AXIS: 29 DEGREES
EKG P-R INTERVAL: 110 MS
EKG Q-T INTERVAL: 366 MS
EKG QRS DURATION: 92 MS
EKG QTC CALCULATION (BAZETT): 414 MS
EKG R AXIS: 30 DEGREES
EKG T AXIS: 41 DEGREES
EKG VENTRICULAR RATE: 77 BPM

## 2024-04-19 ENCOUNTER — HOSPITAL ENCOUNTER (EMERGENCY)
Age: 69
Discharge: HOME | End: 2024-04-19
Attending: EMERGENCY MEDICINE
Payer: OTHER GOVERNMENT

## 2024-04-19 VITALS
SYSTOLIC BLOOD PRESSURE: 147 MMHG | DIASTOLIC BLOOD PRESSURE: 95 MMHG | TEMPERATURE: 98 F | OXYGEN SATURATION: 99 % | RESPIRATION RATE: 16 BRPM | HEART RATE: 87 BPM | BODY MASS INDEX: 23.73 KG/M2 | WEIGHT: 175 LBS

## 2024-04-19 DIAGNOSIS — R20.2 PARESTHESIAS: Primary | ICD-10-CM

## 2024-04-19 LAB
ANION GAP SERPL CALCULATED.3IONS-SCNC: 13 MMOL/L (ref 7–16)
BASOPHILS # BLD: 0.08 K/UL (ref 0–0.2)
BASOPHILS NFR BLD: 1 % (ref 0–2)
BUN SERPL-MCNC: 21 MG/DL (ref 6–23)
CALCIUM SERPL-MCNC: 9 MG/DL (ref 8.6–10.2)
CHLORIDE SERPL-SCNC: 98 MMOL/L (ref 98–107)
CO2 SERPL-SCNC: 22 MMOL/L (ref 22–29)
CREAT SERPL-MCNC: 1.1 MG/DL (ref 0.7–1.2)
EOSINOPHIL # BLD: 0.23 K/UL (ref 0.05–0.5)
EOSINOPHILS RELATIVE PERCENT: 3 % (ref 0–6)
ERYTHROCYTE [DISTWIDTH] IN BLOOD BY AUTOMATED COUNT: 13.2 % (ref 11.5–15)
GFR SERPL CREATININE-BSD FRML MDRD: 76 ML/MIN/1.73M2
GLUCOSE SERPL-MCNC: 98 MG/DL (ref 74–99)
HCT VFR BLD AUTO: 40.7 % (ref 37–54)
HGB BLD-MCNC: 13.3 G/DL (ref 12.5–16.5)
IMM GRANULOCYTES # BLD AUTO: 0.04 K/UL (ref 0–0.58)
IMM GRANULOCYTES NFR BLD: 1 % (ref 0–5)
LYMPHOCYTES NFR BLD: 1.87 K/UL (ref 1.5–4)
LYMPHOCYTES RELATIVE PERCENT: 24 % (ref 20–42)
MAGNESIUM SERPL-MCNC: 2.4 MG/DL (ref 1.6–2.6)
MCH RBC QN AUTO: 30.6 PG (ref 26–35)
MCHC RBC AUTO-ENTMCNC: 32.7 G/DL (ref 32–34.5)
MCV RBC AUTO: 93.8 FL (ref 80–99.9)
MONOCYTES NFR BLD: 0.71 K/UL (ref 0.1–0.95)
MONOCYTES NFR BLD: 9 % (ref 2–12)
NEUTROPHILS NFR BLD: 63 % (ref 43–80)
NEUTS SEG NFR BLD: 5 K/UL (ref 1.8–7.3)
PLATELET # BLD AUTO: 195 K/UL (ref 130–450)
PMV BLD AUTO: 10 FL (ref 7–12)
POTASSIUM SERPL-SCNC: 4.8 MMOL/L (ref 3.5–5)
RBC # BLD AUTO: 4.34 M/UL (ref 3.8–5.8)
SODIUM SERPL-SCNC: 133 MMOL/L (ref 132–146)
WBC OTHER # BLD: 7.9 K/UL (ref 4.5–11.5)

## 2024-04-19 PROCEDURE — 99284 EMERGENCY DEPT VISIT MOD MDM: CPT

## 2024-04-19 PROCEDURE — 80048 BASIC METABOLIC PNL TOTAL CA: CPT

## 2024-04-19 PROCEDURE — 83735 ASSAY OF MAGNESIUM: CPT

## 2024-04-19 PROCEDURE — 2580000003 HC RX 258

## 2024-04-19 PROCEDURE — 96360 HYDRATION IV INFUSION INIT: CPT

## 2024-04-19 PROCEDURE — 85025 COMPLETE CBC W/AUTO DIFF WBC: CPT

## 2024-04-19 RX ORDER — 0.9 % SODIUM CHLORIDE 0.9 %
1000 INTRAVENOUS SOLUTION INTRAVENOUS ONCE
Status: COMPLETED | OUTPATIENT
Start: 2024-04-19 | End: 2024-04-19

## 2024-04-19 RX ADMIN — SODIUM CHLORIDE 1000 ML: 9 INJECTION, SOLUTION INTRAVENOUS at 04:25

## 2024-04-19 NOTE — ED NOTES
Department of Emergency Medicine  FIRST PROVIDER TRIAGE NOTE             Independent MLP           4/19/24  3:24 AM EDT    Date of Encounter: 4/19/24   MRN: [id number]      HPI: Krish De Paz is a 68 y.o. male who presents to the ED for Leg Pain (Patient complaining of bilateral leg pain and numbness.  Patient states he has been experiencing this for one year.  Also complaining of tinnitus. )       ROS: Negative for cp or sob.    PE: Gen Appearance/Constitutional: alert  HEENT: NC/NT. PERRLA,  Airway patent.     Initial Plan of Care: All treatment areas with department are currently occupied. Plan to order/Initiate the following while awaiting opening in ED: .  Initiate Treatment-Testing, Proceed toTreatment Area When Bed Available for ED Attending/MICHAELP to Continue Care    Electronically signed by SHELLY Levy CNP   DD: 4/19/24       Nohemy Taylor APRN - CNP  04/19/24 0324

## 2024-04-19 NOTE — ED PROVIDER NOTES
[facility] EMERGENCY DEPARTMENT  EMERGENCY DEPARTMENT ENCOUNTER        Pt Name: Krish De Paz  MRN: [id number]  Birthdate 1955  Date of evaluation: 4/19/2024  Provider: Beto Ray DO  PCP: No primary care provider on file.  Note Started: 3:59 AM EDT 4/19/24    CHIEF COMPLAINT       Chief Complaint   Patient presents with    Leg Pain     Patient complaining of bilateral leg pain and numbness.  Patient states he has been experiencing this for one year.  Also complaining of tinnitus.        HISTORY OF PRESENT ILLNESS: 1 or more Elements   History From: patient    Limitations to history : None    Krish De Paz is a 68 y.o. male who presents to the emergency department for bilateral leg paresthesias has been going on for a year.  He reports that it has just been going on for a long time and wanted to get it checked out.  [facility].  Also has a history of alcoholism.  No falls or injuries.  No urinary or bowel incontinence.  No saddle paresthesias. Patient denies fever, chills, headache, shortness of breath, chest pain, abdominal pain, nausea, vomiting, diarrhea.    Nursing Notes were all reviewed and agreed with or any disagreements were addressed in the HPI.      REVIEW OF SYSTEMS :           Positives and Pertinent negatives as per HPI.     SURGICAL HISTORY     Past Surgical History:   Procedure Laterality Date    BRONCHOSCOPY  11/30/2015    With BAL and Biopsies    THORACOSCOPY Right 12/04/15    with lung biopsy       CURRENTMEDICATIONS       Discharge Medication List as of 4/19/2024  5:39 AM        CONTINUE these medications which have NOT CHANGED    Details   ondansetron (ZOFRAN ODT) 4 MG disintegrating tablet Take 1 tablet by mouth every 8 hours as needed for Nausea or Vomiting, Disp-10 tablet, R-0Normal      lidocaine 4 % external patch Place 1 patch onto the skin in the morning., TransDERmal, DAILY Starting Tue 7/26/2022,

## 2024-09-21 ENCOUNTER — APPOINTMENT (OUTPATIENT)
Dept: GENERAL RADIOLOGY | Age: 69
End: 2024-09-21
Payer: OTHER GOVERNMENT

## 2024-09-21 LAB
ALBUMIN SERPL-MCNC: 4.5 G/DL (ref 3.5–5.2)
ALP SERPL-CCNC: 89 U/L (ref 40–129)
ALT SERPL-CCNC: 11 U/L (ref 0–40)
ANION GAP SERPL CALCULATED.3IONS-SCNC: 11 MMOL/L (ref 7–16)
AST SERPL-CCNC: 17 U/L (ref 0–39)
BASOPHILS # BLD: 0.07 K/UL (ref 0–0.2)
BILIRUB SERPL-MCNC: 0.3 MG/DL (ref 0–1.2)
BILIRUB UR QL STRIP: NEGATIVE
BUN SERPL-MCNC: 26 MG/DL (ref 6–23)
CALCIUM SERPL-MCNC: 9.6 MG/DL (ref 8.6–10.2)
CLARITY UR: CLEAR
CO2 SERPL-SCNC: 24 MMOL/L (ref 22–29)
COLOR UR: YELLOW
CREAT SERPL-MCNC: 1 MG/DL (ref 0.7–1.2)
EOSINOPHIL # BLD: 0.29 K/UL (ref 0.05–0.5)
ERYTHROCYTE [DISTWIDTH] IN BLOOD BY AUTOMATED COUNT: 14.6 % (ref 11.5–15)
GFR, ESTIMATED: 80 ML/MIN/1.73M2
GLUCOSE SERPL-MCNC: 98 MG/DL (ref 74–99)
GLUCOSE UR STRIP-MCNC: NEGATIVE MG/DL
HCT VFR BLD AUTO: 40.1 % (ref 37–54)
HGB BLD-MCNC: 12.9 G/DL (ref 12.5–16.5)
IMM GRANULOCYTES # BLD AUTO: 0.03 K/UL (ref 0–0.58)
IMM GRANULOCYTES NFR BLD: 0 % (ref 0–5)
KETONES UR STRIP-MCNC: NEGATIVE MG/DL
LACTATE BLDV-SCNC: 0.6 MMOL/L (ref 0.5–2.2)
LEUKOCYTE ESTERASE UR QL STRIP: NEGATIVE
LIPASE SERPL-CCNC: 15 U/L (ref 13–60)
LYMPHOCYTES NFR BLD: 1.92 K/UL (ref 1.5–4)
LYMPHOCYTES RELATIVE PERCENT: 26 % (ref 20–42)
MCH RBC QN AUTO: 31.1 PG (ref 26–35)
MCHC RBC AUTO-ENTMCNC: 32.2 G/DL (ref 32–34.5)
MCV RBC AUTO: 96.6 FL (ref 80–99.9)
MONOCYTES NFR BLD: 0.81 K/UL (ref 0.1–0.95)
MONOCYTES NFR BLD: 11 % (ref 2–12)
NEUTROPHILS NFR BLD: 58 % (ref 43–80)
NEUTS SEG NFR BLD: 4.38 K/UL (ref 1.8–7.3)
NITRITE UR QL STRIP: NEGATIVE
PH UR STRIP: 6 [PH] (ref 5–9)
PLATELET # BLD AUTO: 221 K/UL (ref 130–450)
PMV BLD AUTO: 9.9 FL (ref 7–12)
POTASSIUM SERPL-SCNC: 4.7 MMOL/L (ref 3.5–5)
PROT SERPL-MCNC: 8.3 G/DL (ref 6.4–8.3)
PROT UR STRIP-MCNC: NEGATIVE MG/DL
RBC # BLD AUTO: 4.15 M/UL (ref 3.8–5.8)
RBC #/AREA URNS HPF: ABNORMAL /HPF
SODIUM SERPL-SCNC: 137 MMOL/L (ref 132–146)
SP GR UR STRIP: >1.03 (ref 1–1.03)
TROPONIN I SERPL HS-MCNC: 13 NG/L (ref 0–11)
UROBILINOGEN UR STRIP-ACNC: 0.2 EU/DL (ref 0–1)
WBC #/AREA URNS HPF: ABNORMAL /HPF
WBC OTHER # BLD: 7.5 K/UL (ref 4.5–11.5)

## 2024-09-21 PROCEDURE — 84484 ASSAY OF TROPONIN QUANT: CPT

## 2024-09-21 PROCEDURE — 6370000000 HC RX 637 (ALT 250 FOR IP)

## 2024-09-21 PROCEDURE — 93005 ELECTROCARDIOGRAM TRACING: CPT | Performed by: NURSE PRACTITIONER

## 2024-09-21 PROCEDURE — 81001 URINALYSIS AUTO W/SCOPE: CPT

## 2024-09-21 PROCEDURE — 83605 ASSAY OF LACTIC ACID: CPT

## 2024-09-21 PROCEDURE — 80053 COMPREHEN METABOLIC PANEL: CPT

## 2024-09-21 PROCEDURE — 85025 COMPLETE CBC W/AUTO DIFF WBC: CPT

## 2024-09-21 PROCEDURE — 71045 X-RAY EXAM CHEST 1 VIEW: CPT

## 2024-09-21 PROCEDURE — 83690 ASSAY OF LIPASE: CPT

## 2024-09-21 PROCEDURE — 99285 EMERGENCY DEPT VISIT HI MDM: CPT

## 2024-09-21 RX ORDER — ACETAMINOPHEN 325 MG/1
650 TABLET ORAL ONCE
Status: COMPLETED | OUTPATIENT
Start: 2024-09-21 | End: 2024-09-21

## 2024-09-21 RX ADMIN — ACETAMINOPHEN 650 MG: 325 TABLET ORAL at 22:48

## 2024-09-21 ASSESSMENT — LIFESTYLE VARIABLES
HOW MANY STANDARD DRINKS CONTAINING ALCOHOL DO YOU HAVE ON A TYPICAL DAY: PATIENT DOES NOT DRINK
HOW OFTEN DO YOU HAVE A DRINK CONTAINING ALCOHOL: NEVER

## 2024-09-22 ENCOUNTER — APPOINTMENT (OUTPATIENT)
Dept: CT IMAGING | Age: 69
End: 2024-09-22
Payer: OTHER GOVERNMENT

## 2024-09-22 ENCOUNTER — HOSPITAL ENCOUNTER (EMERGENCY)
Age: 69
Discharge: HOME | End: 2024-09-22
Attending: EMERGENCY MEDICINE
Payer: OTHER GOVERNMENT

## 2024-09-22 VITALS
OXYGEN SATURATION: 95 % | TEMPERATURE: 99.4 F | RESPIRATION RATE: 18 BRPM | SYSTOLIC BLOOD PRESSURE: 184 MMHG | DIASTOLIC BLOOD PRESSURE: 84 MMHG | WEIGHT: 174.5 LBS | HEIGHT: 72 IN | BODY MASS INDEX: 23.63 KG/M2 | HEART RATE: 62 BPM

## 2024-09-22 DIAGNOSIS — M79.604 BILATERAL LEG PAIN: Primary | ICD-10-CM

## 2024-09-22 DIAGNOSIS — M79.605 BILATERAL LEG PAIN: Primary | ICD-10-CM

## 2024-09-22 DIAGNOSIS — R10.13 EPIGASTRIC PAIN: ICD-10-CM

## 2024-09-22 DIAGNOSIS — R20.2 PARESTHESIA OF LOWER EXTREMITY: ICD-10-CM

## 2024-09-22 DIAGNOSIS — R51.9 NONINTRACTABLE HEADACHE, UNSPECIFIED CHRONICITY PATTERN, UNSPECIFIED HEADACHE TYPE: ICD-10-CM

## 2024-09-22 DIAGNOSIS — K62.5 RECTAL BLEEDING: ICD-10-CM

## 2024-09-22 PROCEDURE — 6360000004 HC RX CONTRAST MEDICATION: Performed by: RADIOLOGY

## 2024-09-22 PROCEDURE — 74177 CT ABD & PELVIS W/CONTRAST: CPT

## 2024-09-22 PROCEDURE — 70450 CT HEAD/BRAIN W/O DYE: CPT

## 2024-09-22 RX ORDER — PANTOPRAZOLE SODIUM 40 MG/1
40 TABLET, DELAYED RELEASE ORAL
Qty: 30 TABLET | Refills: 0 | Status: SHIPPED | OUTPATIENT
Start: 2024-09-22

## 2024-09-22 RX ORDER — IOPAMIDOL 755 MG/ML
75 INJECTION, SOLUTION INTRAVASCULAR
Status: COMPLETED | OUTPATIENT
Start: 2024-09-22 | End: 2024-09-22

## 2024-09-22 RX ORDER — PANTOPRAZOLE SODIUM 40 MG/1
40 TABLET, DELAYED RELEASE ORAL
Qty: 30 TABLET | Refills: 0 | Status: SHIPPED | OUTPATIENT
Start: 2024-09-22 | End: 2024-09-22

## 2024-09-22 RX ADMIN — IOPAMIDOL 75 ML: 755 INJECTION, SOLUTION INTRAVENOUS at 01:52

## 2024-09-23 LAB
EKG ATRIAL RATE: 75 BPM
EKG P AXIS: 18 DEGREES
EKG P-R INTERVAL: 112 MS
EKG Q-T INTERVAL: 326 MS
EKG QRS DURATION: 96 MS
EKG QTC CALCULATION (BAZETT): 364 MS
EKG R AXIS: 62 DEGREES
EKG T AXIS: 30 DEGREES
EKG VENTRICULAR RATE: 75 BPM

## 2025-04-16 ENCOUNTER — APPOINTMENT (OUTPATIENT)
Dept: CT IMAGING | Age: 70
End: 2025-04-16
Payer: OTHER GOVERNMENT

## 2025-04-16 ENCOUNTER — HOSPITAL ENCOUNTER (EMERGENCY)
Age: 70
Discharge: HOME | End: 2025-04-16
Payer: OTHER GOVERNMENT

## 2025-04-16 VITALS
OXYGEN SATURATION: 93 % | TEMPERATURE: 98.4 F | SYSTOLIC BLOOD PRESSURE: 170 MMHG | HEART RATE: 82 BPM | DIASTOLIC BLOOD PRESSURE: 95 MMHG | RESPIRATION RATE: 16 BRPM

## 2025-04-16 DIAGNOSIS — S02.2XXA NASAL SEPTUM FRACTURE, CLOSED, INITIAL ENCOUNTER: ICD-10-CM
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
## 2025-04-16 PROCEDURE — 72125 CT NECK SPINE W/O DYE: CPT

## 2025-04-16 PROCEDURE — 70486 CT MAXILLOFACIAL W/O DYE: CPT

## 2025-04-16 PROCEDURE — 6360000002 HC RX W HCPCS: Performed by: NURSE PRACTITIONER

## 2025-04-16 PROCEDURE — 96372 THER/PROPH/DIAG INJ SC/IM: CPT

## 2025-04-16 PROCEDURE — 99284 EMERGENCY DEPT VISIT MOD MDM: CPT

## 2025-04-16 PROCEDURE — 70450 CT HEAD/BRAIN W/O DYE: CPT

## 2025-04-16 RX ORDER — IBUPROFEN 600 MG/1
600 TABLET, FILM COATED ORAL 3 TIMES DAILY PRN
Qty: 30 TABLET | Refills: 0 | Status: SHIPPED | OUTPATIENT
Start: 2025-04-16

## 2025-04-16 RX ORDER — KETOROLAC TROMETHAMINE 30 MG/ML
30 INJECTION, SOLUTION INTRAMUSCULAR; INTRAVENOUS ONCE
Status: COMPLETED | OUTPATIENT
Start: 2025-04-16 | End: 2025-04-16

## 2025-04-16 RX ADMIN — KETOROLAC TROMETHAMINE 30 MG: 30 INJECTION, SOLUTION INTRAMUSCULAR at 23:02

## 2025-04-16 ASSESSMENT — LIFESTYLE VARIABLES
HOW OFTEN DO YOU HAVE A DRINK CONTAINING ALCOHOL: NEVER
HOW MANY STANDARD DRINKS CONTAINING ALCOHOL DO YOU HAVE ON A TYPICAL DAY: PATIENT DOES NOT DRINK

## 2025-04-16 ASSESSMENT — PAIN DESCRIPTION - LOCATION: LOCATION: EYE;NOSE

## 2025-04-16 ASSESSMENT — PAIN DESCRIPTION - ORIENTATION: ORIENTATION: RIGHT

## 2025-04-16 ASSESSMENT — PAIN SCALES - GENERAL: PAINLEVEL_OUTOF10: 10

## 2025-04-17 NOTE — ED NOTES
[facility] from [address] is on their way to get pt. Pt will be placed in ER waiting room on wheelchair.

## 2025-04-17 NOTE — ED PROVIDER NOTES
Independent BA Visit.       [facility] EMERGENCY DEPARTMENT  ED  Encounter Note  Admit Date/RoomTime: 2025 10:49 PM  ED Room: PR2/PR2  NAME: Krish De Paz  : 1955  MRN: [id number]  PCP: No primary care provider on file.    CHIEF COMPLAINT     Fall (Fell @home , tripped and hit face. Went to [facility] today and they wanted patient checked out d/t crooked nose and right eye bruising. Last used fentanyl this afternoon. )    HISTORY OF PRESENT ILLNESS        Krish De Paz is a 69 y.o. male who presents to the ED via private vehicle after having a mechanical fall at home on .  States that he tripped falling onto his face.  [facility] and [facility] him here for evaluation due to his facial injuries.  He is in rehab for fentanyl abuse.  Last used today.  He denies any anticoagulation.  Reports that at the time of the fall he was in his kitchen turned around tripped over his feet and fell directly onto his face.  He did not experience a loss of consciousness.  He did not seek prior care.  He is not having any headache, nausea vomiting or dizziness.  Denies neck pain  REVIEW OF SYSTEMS     Pertinent positives and negatives are stated within HPI, all other systems reviewed and are negative.    Past Medical History:  has a past medical history of Alcoholic (HCC), CHF (congestive heart failure) (HCC), Depression, Depression, Hep B w/ coma, chronic, w/ delta (HCC), Hep C w/ coma, chronic, Heroin abuse, Heroin abuse, Sleep apnea, obstructive, Suicide attempt (HCC), and Suicide attempt (HCC).  Surgical History:  has a past surgical history that includes bronchoscopy (2015) and Thoracoscopy (Right, 12/04/15).  Social History:  reports that he has been smoking cigarettes and e-cigarettes. He started smoking about 9 months ago. He does not have any smokeless tobacco history on file. He reports current drug use. Drug:

## 2025-05-09 ENCOUNTER — APPOINTMENT (OUTPATIENT)
Dept: CT IMAGING | Age: 70
End: 2025-05-09
Attending: EMERGENCY MEDICINE
Payer: OTHER GOVERNMENT

## 2025-05-09 ENCOUNTER — HOSPITAL ENCOUNTER (EMERGENCY)
Age: 70
Discharge: HOME | End: 2025-05-10
Attending: EMERGENCY MEDICINE
Payer: OTHER GOVERNMENT

## 2025-05-09 ENCOUNTER — APPOINTMENT (OUTPATIENT)
Dept: GENERAL RADIOLOGY | Age: 70
End: 2025-05-09
Payer: OTHER GOVERNMENT

## 2025-05-09 DIAGNOSIS — T50.901A ACCIDENTAL OVERDOSE, INITIAL ENCOUNTER: Primary | ICD-10-CM

## 2025-05-09 DIAGNOSIS — E86.0 DEHYDRATION: ICD-10-CM

## 2025-05-09 LAB
ALBUMIN SERPL-MCNC: 3.8 G/DL (ref 3.5–5.2)
ALP SERPL-CCNC: 98 U/L (ref 40–129)
ALT SERPL-CCNC: 10 U/L (ref 0–50)
AMPHET UR QL SCN: NEGATIVE
ANION GAP SERPL CALCULATED.3IONS-SCNC: 11 MMOL/L (ref 7–16)
APAP SERPL-MCNC: <5 UG/ML (ref 10–30)
AST SERPL-CCNC: 22 U/L (ref 0–50)
B.E.: -4 MMOL/L (ref -3–3)
BARBITURATES UR QL SCN: POSITIVE
BASOPHILS # BLD: 0.04 K/UL (ref 0–0.2)
BASOPHILS NFR BLD: 1 % (ref 0–2)
BENZODIAZ UR QL: NEGATIVE
BILIRUB SERPL-MCNC: <0.2 MG/DL (ref 0–1.2)
BUN SERPL-MCNC: 24 MG/DL (ref 8–23)
BUPRENORPHINE UR QL: NEGATIVE
CALCIUM SERPL-MCNC: 9 MG/DL (ref 8.8–10.2)
CANNABINOIDS UR QL SCN: NEGATIVE
CHLORIDE SERPL-SCNC: 102 MMOL/L (ref 98–107)
CO2 SERPL-SCNC: 22 MMOL/L (ref 22–29)
COCAINE UR QL SCN: NEGATIVE
COHB: 2.7 % (ref 0–1.5)
CREAT SERPL-MCNC: 1.5 MG/DL (ref 0.7–1.2)
CRITICAL: ABNORMAL
DATE ANALYZED: ABNORMAL
DATE OF COLLECTION: ABNORMAL
EOSINOPHIL # BLD: 0.21 K/UL (ref 0.05–0.5)
EOSINOPHILS RELATIVE PERCENT: 3 % (ref 0–6)
ERYTHROCYTE [DISTWIDTH] IN BLOOD BY AUTOMATED COUNT: 13 % (ref 11.5–15)
ETHANOLAMINE SERPL-MCNC: <10 MG/DL (ref 0–0.08)
FENTANYL UR QL: POSITIVE
GFR, ESTIMATED: 51 ML/MIN/1.73M2
GLUCOSE SERPL-MCNC: 101 MG/DL (ref 74–99)
HCO3: 22.1 MMOL/L (ref 22–26)
HCT VFR BLD AUTO: 35.6 % (ref 37–54)
HGB BLD-MCNC: 11.9 G/DL (ref 12.5–16.5)
HHB: 3.5 % (ref 0–5)
IMM GRANULOCYTES # BLD AUTO: 0.04 K/UL (ref 0–0.58)
IMM GRANULOCYTES NFR BLD: 1 % (ref 0–5)
LAB: ABNORMAL
LYMPHOCYTES NFR BLD: 1.78 K/UL (ref 1.5–4)
LYMPHOCYTES RELATIVE PERCENT: 27 % (ref 20–42)
Lab: 1851
MCH RBC QN AUTO: 32.1 PG (ref 26–35)
MCHC RBC AUTO-ENTMCNC: 33.4 G/DL (ref 32–34.5)
MCV RBC AUTO: 96 FL (ref 80–99.9)
METHADONE UR QL: POSITIVE
METHB: 0.2 % (ref 0–1.5)
MODE: ABNORMAL
MONOCYTES NFR BLD: 0.74 K/UL (ref 0.1–0.95)
MONOCYTES NFR BLD: 11 % (ref 2–12)
NEUTROPHILS NFR BLD: 57 % (ref 43–80)
NEUTS SEG NFR BLD: 3.71 K/UL (ref 1.8–7.3)
O2 SATURATION: 96.4 % (ref 92–98.5)
O2HB: 93.6 % (ref 94–97)
OPERATOR ID: 1868
OPIATES UR QL SCN: NEGATIVE
OXYCODONE UR QL SCN: NEGATIVE
PATIENT TEMP: 37 C
PCO2: 44.1 MMHG (ref 35–45)
PCP UR QL SCN: NEGATIVE
PH BLOOD GAS: 7.32 (ref 7.35–7.45)
PLATELET # BLD AUTO: 237 K/UL (ref 130–450)
PMV BLD AUTO: 9.9 FL (ref 7–12)
PO2: 92.5 MMHG (ref 75–100)
POTASSIUM SERPL-SCNC: 4.5 MMOL/L (ref 3.5–5.1)
PROT SERPL-MCNC: 7.9 G/DL (ref 6.4–8.3)
RBC # BLD AUTO: 3.71 M/UL (ref 3.8–5.8)
SALICYLATES SERPL-MCNC: <0.5 MG/DL (ref 0–30)
SODIUM SERPL-SCNC: 136 MMOL/L (ref 136–145)
SOURCE, BLOOD GAS: ABNORMAL
TEST INFORMATION: ABNORMAL
THB: 12.6 G/DL (ref 11.5–16.5)
TIME ANALYZED: 1854
TOXIC TRICYCLIC SC,BLOOD: NEGATIVE
WBC OTHER # BLD: 6.5 K/UL (ref 4.5–11.5)

## 2025-05-09 PROCEDURE — 80053 COMPREHEN METABOLIC PANEL: CPT

## 2025-05-09 PROCEDURE — 80307 DRUG TEST PRSMV CHEM ANLYZR: CPT

## 2025-05-09 PROCEDURE — 71045 X-RAY EXAM CHEST 1 VIEW: CPT

## 2025-05-09 PROCEDURE — 82805 BLOOD GASES W/O2 SATURATION: CPT

## 2025-05-09 PROCEDURE — G0480 DRUG TEST DEF 1-7 CLASSES: HCPCS

## 2025-05-09 PROCEDURE — 80179 DRUG ASSAY SALICYLATE: CPT

## 2025-05-09 PROCEDURE — 80143 DRUG ASSAY ACETAMINOPHEN: CPT

## 2025-05-09 PROCEDURE — 72125 CT NECK SPINE W/O DYE: CPT

## 2025-05-09 PROCEDURE — 6360000002 HC RX W HCPCS: Performed by: EMERGENCY MEDICINE

## 2025-05-09 PROCEDURE — 2580000003 HC RX 258: Performed by: EMERGENCY MEDICINE

## 2025-05-09 PROCEDURE — 70450 CT HEAD/BRAIN W/O DYE: CPT

## 2025-05-09 PROCEDURE — 99284 EMERGENCY DEPT VISIT MOD MDM: CPT

## 2025-05-09 PROCEDURE — 85025 COMPLETE CBC W/AUTO DIFF WBC: CPT

## 2025-05-09 PROCEDURE — 96374 THER/PROPH/DIAG INJ IV PUSH: CPT

## 2025-05-09 PROCEDURE — 96361 HYDRATE IV INFUSION ADD-ON: CPT

## 2025-05-09 RX ORDER — 0.9 % SODIUM CHLORIDE 0.9 %
1000 INTRAVENOUS SOLUTION INTRAVENOUS ONCE
Status: COMPLETED | OUTPATIENT
Start: 2025-05-09 | End: 2025-05-10

## 2025-05-09 RX ORDER — NALOXONE HYDROCHLORIDE 1 MG/ML
0.4 INJECTION INTRAMUSCULAR; INTRAVENOUS; SUBCUTANEOUS ONCE
Status: COMPLETED | OUTPATIENT
Start: 2025-05-09 | End: 2025-05-09

## 2025-05-09 RX ORDER — NALOXONE HYDROCHLORIDE 1 MG/ML
0.4 INJECTION INTRAMUSCULAR; INTRAVENOUS; SUBCUTANEOUS ONCE
Status: DISCONTINUED | OUTPATIENT
Start: 2025-05-09 | End: 2025-05-09

## 2025-05-09 RX ADMIN — NALOXONE HYDROCHLORIDE 0.4 MG: 1 INJECTION INTRAMUSCULAR; INTRAVENOUS; SUBCUTANEOUS at 18:45

## 2025-05-09 RX ADMIN — SODIUM CHLORIDE 1000 ML: 0.9 INJECTION, SOLUTION INTRAVENOUS at 23:06

## 2025-05-10 VITALS
OXYGEN SATURATION: 92 % | RESPIRATION RATE: 15 BRPM | TEMPERATURE: 98.1 F | SYSTOLIC BLOOD PRESSURE: 140 MMHG | HEART RATE: 70 BPM | DIASTOLIC BLOOD PRESSURE: 86 MMHG

## 2025-05-10 PROCEDURE — 96361 HYDRATE IV INFUSION ADD-ON: CPT

## 2025-05-10 NOTE — ED PROVIDER NOTES
HPI:  5/9/25, Time: 11:13 PM EDT         Krish De Paz is a 69 y.o. male presenting to the ED for fall.  Patient mechanical fall at home.  He said he got lightheaded and fell.  He says he took Benadryl prior to arrival initially.  However, did admit to me he did heroin.  He was snorting it.  He thinks it was laced.  He was hypoxic in the field.  He is sleepy and difficult to arouse.  Denies any other symptoms or complaints.    Review of Systems:   A complete review of systems was performed and pertinent positives and negatives are stated within HPI, all other systems reviewed and are negative.          --------------------------------------------- PAST HISTORY ---------------------------------------------  Past Medical History:  has a past medical history of Alcoholic (HCC), CHF (congestive heart failure) (HCC), Depression, Depression, Hep B w/ coma, chronic, w/ delta (HCC), Hep C w/ coma, chronic, Heroin abuse (HCC), Heroin abuse (HCC), Sleep apnea, obstructive, Suicide attempt (HCC), and Suicide attempt (HCC).    Past Surgical History:  has a past surgical history that includes bronchoscopy (11/30/2015) and Thoracoscopy (Right, 12/04/15).    Social History:  reports that he has been smoking cigarettes and e-cigarettes. He started smoking about 10 months ago. He does not have any smokeless tobacco history on file. He reports current drug use. Drug: Opiates . He reports that he does not drink alcohol.    Family History: family history includes Cancer in his father, mother, and sister.     The patient’s home medications have been reviewed.    Allergies: Patient has no known allergies.    -------------------------------------------------- RESULTS -------------------------------------------------  All laboratory and radiology results have been personally reviewed by myself   LABS:  Results for orders placed or performed during the hospital encounter of 05/09/25   Comprehensive Metabolic Panel   Result Value Ref

## 2025-07-05 ENCOUNTER — APPOINTMENT (OUTPATIENT)
Dept: CT IMAGING | Age: 70
End: 2025-07-05
Payer: OTHER GOVERNMENT

## 2025-07-05 ENCOUNTER — HOSPITAL ENCOUNTER (EMERGENCY)
Age: 70
Discharge: HOME | End: 2025-07-05
Attending: EMERGENCY MEDICINE
Payer: OTHER GOVERNMENT

## 2025-07-05 ENCOUNTER — APPOINTMENT (OUTPATIENT)
Dept: GENERAL RADIOLOGY | Age: 70
End: 2025-07-05
Payer: OTHER GOVERNMENT

## 2025-07-05 VITALS
HEART RATE: 97 BPM | RESPIRATION RATE: 20 BRPM | SYSTOLIC BLOOD PRESSURE: 106 MMHG | OXYGEN SATURATION: 95 % | DIASTOLIC BLOOD PRESSURE: 68 MMHG | TEMPERATURE: 97.9 F

## 2025-07-05 DIAGNOSIS — M54.31 SCIATICA OF RIGHT SIDE: Primary | ICD-10-CM

## 2025-07-05 PROCEDURE — 6370000000 HC RX 637 (ALT 250 FOR IP)

## 2025-07-05 PROCEDURE — 72131 CT LUMBAR SPINE W/O DYE: CPT

## 2025-07-05 PROCEDURE — 99284 EMERGENCY DEPT VISIT MOD MDM: CPT

## 2025-07-05 PROCEDURE — 73502 X-RAY EXAM HIP UNI 2-3 VIEWS: CPT

## 2025-07-05 RX ORDER — METHYLPREDNISOLONE 4 MG/1
TABLET ORAL
Qty: 1 KIT | Refills: 0 | Status: SHIPPED | OUTPATIENT
Start: 2025-07-05 | End: 2025-07-11

## 2025-07-05 RX ORDER — METHYLPREDNISOLONE 4 MG/1
TABLET ORAL
Qty: 1 KIT | Refills: 0 | Status: SHIPPED | OUTPATIENT
Start: 2025-07-05 | End: 2025-07-05

## 2025-07-05 RX ORDER — ACETAMINOPHEN 500 MG
1000 TABLET ORAL ONCE
Status: COMPLETED | OUTPATIENT
Start: 2025-07-05 | End: 2025-07-05

## 2025-07-05 RX ADMIN — ACETAMINOPHEN 1000 MG: 500 TABLET ORAL at 14:58

## 2025-07-05 ASSESSMENT — PAIN DESCRIPTION - ORIENTATION: ORIENTATION: RIGHT

## 2025-07-05 ASSESSMENT — PAIN DESCRIPTION - DESCRIPTORS: DESCRIPTORS: ACHING

## 2025-07-05 ASSESSMENT — PAIN DESCRIPTION - LOCATION: LOCATION: HIP

## 2025-07-05 NOTE — ED PROVIDER NOTES
[facility] EMERGENCY DEPARTMENT  EMERGENCY DEPARTMENT ENCOUNTER        Pt Name: Krish De Paz  MRN: [id number]  Birthdate 1955  Date of evaluation: 7/5/2025  Provider: Pablo Bahena MD  PCP: No primary care provider on file.  Note Started: 2:37 PM EDT 7/5/25    CHIEF COMPLAINT       Chief Complaint   Patient presents with    Hip Pain     R hip pain for 3 weeks, per pt he fell a weeks ago, pt states \"I didn't have a bam bam slam fall, I didn't feel no cracks or anything\". Pt takes methadone       HISTORY OF PRESENT ILLNESS: 1 or more Elements   History From: Patient    Limitations to history : None  Social Determinants : None    Krish De Paz is a 70 y.o. male with a history of interstitial lung disease, polysubstance abuse, COPD, hypertension who presents with complaints of right-sided hip pain has been going on since the past 3 days.  Patient mentioned that he did have a fall about 3 weeks ago and then fell on the right side hitting on his hip and the low back.  Since then he has been having pain on and off and has been using a cane and a walker to walk.  Patient can bear weight but cannot walk without using her cane.  Does mention that sometimes the pain radiates from the right hip going on towards the back of his leg towards the knee.    He is currently on methadone for history of substance abuse..    Denies any fever, chills, nausea, vomiting, headache, dizziness, vision changes, neck tenderness or stiffness, weakness, chest pain, palpitations, leg swelling/tenderness, sob, cough, abdominal pain, dysuria, hematuria, diarrhea, constipation, bloody stools.    Nursing Notes were all reviewed and agreed with or any disagreements were addressed in the HPI.    ROS:   Pertinent positives and negatives are stated within HPI, all other systems reviewed and are negative.      --------------------------------------------- PAST HISTORY ---------------------------------------------  Past  states he has been having ongoing numbness to his feet, and his doctor is working him up for neuropathy.  Patient states he is not diabetic.  Patient has a history of heroin abuse, but he adamantly denies any recent IV drug use.  He states that it has been many years since he has used IV drugs, he is currently on methadone.  He denies any personal cancer history.  He denies headache, chest pain, shortness of breath abdominal pain, nausea, vomiting, and changes in stool. [JA]   2028 PHYSICAL EXAM:  Constitutional/General: Alert and oriented x3, well appearing, non toxic in NAD  Head: Normocephalic and atraumatic  Mouth: Oropharynx clear, handling secretions, no trismus  Neck: Supple, full ROM, no midline cervical spine tenderness.  Pulmonary: Lungs clear to auscultation bilaterally, no wheezes, rales, or rhonchi. Not in respiratory distress  Cardiovascular:  Regular rate and rhythm. 2+ distal pulses  Abdomen: Soft, non tender, non distended  Back: No midline thoracic or lumbar tenderness.  5/5 strength in bilateral upper and lower extremities. Normal sensation in upper and lower extremities bilaterally. Normal ankle/toe dorsiflexion and plantar flexion. Normal knee flexion and extension. Normal thigh adduction. Normal inner thigh sensation. Normal gait.    Extremities: Moves all extremities x 4. Warm and well perfused.  Right lower extremity-pedal pulses intact, warm and well-perfused, no swelling.  Tenderness to hip with no obvious deformity.  No warmth or erythema to joint.  Skin: warm and dry without rash  Neurologic: GCS 15, no focal motor or sensory deficits   Psych: Normal Affect. Behavior normal.   [JA]   2029 Differential diagnosis includes hip fracture, hip contusion, spinal fracture, sciatica.    Patient given a dose of oral Tylenol. [JA]   2030 Right hip x-ray with pelvis interpreted by me.  Interpretation-no fracture or dislocation.  Radiologist confirms read.  Radiologist notes possible femoracetabular

## 2025-07-25 ENCOUNTER — HOSPITAL ENCOUNTER (EMERGENCY)
Age: 70
Discharge: HOME | End: 2025-07-27
Attending: STUDENT IN AN ORGANIZED HEALTH CARE EDUCATION/TRAINING PROGRAM
Payer: OTHER GOVERNMENT

## 2025-07-25 VITALS
RESPIRATION RATE: 18 BRPM | BODY MASS INDEX: 21.13 KG/M2 | TEMPERATURE: 97.3 F | HEIGHT: 72 IN | HEART RATE: 70 BPM | DIASTOLIC BLOOD PRESSURE: 95 MMHG | SYSTOLIC BLOOD PRESSURE: 150 MMHG | WEIGHT: 156 LBS | OXYGEN SATURATION: 96 %

## 2025-07-25 DIAGNOSIS — F11.93 ACUTE OPIOID WITHDRAWAL (HCC): Primary | ICD-10-CM

## 2025-07-25 PROCEDURE — 99282 EMERGENCY DEPT VISIT SF MDM: CPT

## 2025-07-25 NOTE — CARE COORDINATION
Peer Recovery Support Note       Name:  Krish De Paz   Date:    7/25/2025        Chief Complaint   Patient presents with    Drug / Alcohol Assessment     Attempted to go to [place] for drug rehab, was sent here. Reports he is addicted to fentanyl.            Peer Support met with patient.  [x] Support and education provided  [] Resources provided   [] Treatment referral:   [x] Other:   [] Patient declined peer recovery services      Referred By: Rocío (CARMENZA)     Notes: Peer met with patient. Patient is interested in detox. Patient has [facility] insurance. Peer has asked questions and actively listened to patient. Patient states that he is homeless, is drinking, and using other drugs. Patient initially went to [facility]. [facility] sent him to [facility] ED due to no prior authorization from the [facility]. Peer has reached out to the [facility] to seek assistance. The nurses station is going to place a phone call and then follow-up with peer to see what can be done with this patient. In the meantime, patient may have to go to the [facility] in [place] [facility] the [facility]. Patient is disgusted and wondering what is he going to do. Once peer find out more information, peer will follow up with patient.     Peer received a phone call back from the [facility]. Since patient isn't presenting any medical ailments for issues, once patient is discharged, it is suggested that he go to the [facility] since he is homeless and reach out to the [facility] on Monday. It is also suggested for patient to contact coordinated entry at [phone] to assist with establishing housing . If patient is experiencing any type of withdrawal symptoms, it is suggested that patient goes to the emergency room at the [facility] in [facility].

## 2025-07-25 NOTE — ED NOTES
Patient is experiencing mild withdrawal symptoms and was reccommended to make referral to [facility] for further evaluation and possible detox/rehab tx.      called [phone] and spoke to the , transferred me to the transfer center.  At this time no one answered and a detailed voicemail was left.     faxed over a referral to [phone].

## 2025-07-25 NOTE — ED PROVIDER NOTES
[facility] EMERGENCY DEPARTMENT  EMERGENCY DEPARTMENT ENCOUNTER    Pt Name: Krish De Paz  MRN: [id number]  Birthdate 1955  Date of evaluation: 7/25/2025  Provider: Home Shane MD  PCP: No primary care provider on file.  Note Started: 2:24 PM EDT 7/25/25    HPI     Patient is a 70 y.o. male presents with a chief complaint of   Chief Complaint   Patient presents with    Drug / Alcohol Assessment     Attempted to go to [place] for drug rehab, was sent here. Reports he is addicted to fentanyl.    .  Patient presented for concern for drug and rehab.  Patient reportedly had used fentanyl yesterday.  Patient states that he was already given a dose of methadone.  Patient stated that he needed to come to the emergency room to get a referral.  Patient denies any new complaints at this time.  Denies any suicidal homicidal ideation.  No chest pain or shortness of breath.  No changes in urinary or bowel      Nursing Notes were all reviewed and agreed with or any disagreements were addressed in the HPI.    History From: Patient    Review of Systems   Pertinent positives and negatives as per HPI.     Physical Exam  Vitals and nursing note reviewed.   Constitutional:       Appearance: He is well-developed.   HENT:      Head: Normocephalic and atraumatic.   Eyes:      Conjunctiva/sclera: Conjunctivae normal.   Cardiovascular:      Rate and Rhythm: Normal rate and regular rhythm.      Heart sounds: Normal heart sounds. No murmur heard.  Pulmonary:      Effort: Pulmonary effort is normal. No respiratory distress.      Breath sounds: Normal breath sounds. No wheezing or rales.   Abdominal:      General: Bowel sounds are normal.      Palpations: Abdomen is soft.      Tenderness: There is no abdominal tenderness. There is no guarding or rebound.   Musculoskeletal:         General: No tenderness or deformity.      Cervical back: Normal range of motion and neck supple.   Skin:     General: Skin

## 2025-07-26 NOTE — ED NOTES
Writer spoke to patient who expressed feeling frustrated as he has been here since 10:30am. Writer called [facility] and spoke to Dana. Dana stated that the patient chart was not faxed to the correct number. Writer re-faxing chart to [phone].

## 2025-07-26 NOTE — ED NOTES
Writer called [facility] who stated that they only have outpatient detox and provided writer with information to forward to patient. Patient stated that he is going to detox at [facility] where he is an active patient and just needs \"the piece of paper \" from the doctor stating that he is medically cleared for detox. Writer spoke to the provider and provided the patient with medical clearance discharge paper work.

## 2025-07-26 NOTE — DISCHARGE INSTRUCTIONS
Patient is medically cleared to go to [place].    Return if any new or worsening symptoms.  Return if any chest pain or shortness of breath.  Follow-up with your primary care provider.

## 2025-07-28 ENCOUNTER — HOSPITAL ENCOUNTER (EMERGENCY)
Age: 70
Discharge: HOME | DRG: 391 | End: 2025-07-29
Attending: STUDENT IN AN ORGANIZED HEALTH CARE EDUCATION/TRAINING PROGRAM
Payer: OTHER GOVERNMENT

## 2025-07-28 ENCOUNTER — APPOINTMENT (OUTPATIENT)
Dept: GENERAL RADIOLOGY | Age: 70
DRG: 391 | End: 2025-07-28
Payer: OTHER GOVERNMENT

## 2025-07-28 DIAGNOSIS — F19.10 POLYSUBSTANCE ABUSE (HCC): ICD-10-CM

## 2025-07-28 DIAGNOSIS — E86.0 DEHYDRATION: Primary | ICD-10-CM

## 2025-07-28 DIAGNOSIS — N17.9 AKI (ACUTE KIDNEY INJURY): ICD-10-CM

## 2025-07-28 LAB
ALBUMIN SERPL-MCNC: 3.7 G/DL (ref 3.5–5.2)
ALP SERPL-CCNC: 80 U/L (ref 40–129)
ALT SERPL-CCNC: 12 U/L (ref 0–50)
AMPHET UR QL SCN: NEGATIVE
ANION GAP SERPL CALCULATED.3IONS-SCNC: 15 MMOL/L (ref 7–16)
APAP SERPL-MCNC: 5 UG/ML (ref 10–30)
AST SERPL-CCNC: 23 U/L (ref 0–50)
BACTERIA URNS QL MICRO: ABNORMAL
BARBITURATES UR QL SCN: NEGATIVE
BASOPHILS # BLD: 0.05 K/UL (ref 0–0.2)
BASOPHILS NFR BLD: 1 % (ref 0–2)
BENZODIAZ UR QL: POSITIVE
BILIRUB SERPL-MCNC: 0.2 MG/DL (ref 0–1.2)
BILIRUB UR QL STRIP: NEGATIVE
BUN SERPL-MCNC: 29 MG/DL (ref 8–23)
BUPRENORPHINE UR QL: NEGATIVE
CALCIUM SERPL-MCNC: 9.1 MG/DL (ref 8.8–10.2)
CANNABINOIDS UR QL SCN: NEGATIVE
CHLORIDE SERPL-SCNC: 98 MMOL/L (ref 98–107)
CLARITY UR: CLEAR
CO2 SERPL-SCNC: 24 MMOL/L (ref 22–29)
COCAINE UR QL SCN: POSITIVE
COLOR UR: YELLOW
CREAT SERPL-MCNC: 2.1 MG/DL (ref 0.7–1.2)
EOSINOPHIL # BLD: 0.38 K/UL (ref 0.05–0.5)
EOSINOPHILS RELATIVE PERCENT: 4 % (ref 0–6)
ERYTHROCYTE [DISTWIDTH] IN BLOOD BY AUTOMATED COUNT: 14.9 % (ref 11.5–15)
ETHANOLAMINE SERPL-MCNC: <10 MG/DL (ref 0–0.08)
FENTANYL UR QL: POSITIVE
GFR, ESTIMATED: 33 ML/MIN/1.73M2
GLUCOSE SERPL-MCNC: 103 MG/DL (ref 74–99)
GLUCOSE UR STRIP-MCNC: NEGATIVE MG/DL
HCT VFR BLD AUTO: 40.1 % (ref 37–54)
HGB BLD-MCNC: 13.2 G/DL (ref 12.5–16.5)
HGB UR QL STRIP.AUTO: NEGATIVE
IMM GRANULOCYTES # BLD AUTO: 0.04 K/UL (ref 0–0.58)
IMM GRANULOCYTES NFR BLD: 0 % (ref 0–5)
KETONES UR STRIP-MCNC: ABNORMAL MG/DL
LEUKOCYTE ESTERASE UR QL STRIP: NEGATIVE
LYMPHOCYTES NFR BLD: 2.19 K/UL (ref 1.5–4)
LYMPHOCYTES RELATIVE PERCENT: 23 % (ref 20–42)
MCH RBC QN AUTO: 31.2 PG (ref 26–35)
MCHC RBC AUTO-ENTMCNC: 32.9 G/DL (ref 32–34.5)
MCV RBC AUTO: 94.8 FL (ref 80–99.9)
METHADONE UR QL: POSITIVE
MONOCYTES NFR BLD: 1 K/UL (ref 0.1–0.95)
MONOCYTES NFR BLD: 10 % (ref 2–12)
NEUTROPHILS NFR BLD: 62 % (ref 43–80)
NEUTS SEG NFR BLD: 5.96 K/UL (ref 1.8–7.3)
NITRITE UR QL STRIP: NEGATIVE
OPIATES UR QL SCN: NEGATIVE
OXYCODONE UR QL SCN: NEGATIVE
PCP UR QL SCN: NEGATIVE
PH UR STRIP: 5.5 [PH] (ref 5–8)
PLATELET # BLD AUTO: 256 K/UL (ref 130–450)
PMV BLD AUTO: 9 FL (ref 7–12)
POTASSIUM SERPL-SCNC: 4.4 MMOL/L (ref 3.5–5.1)
PROT SERPL-MCNC: 7.5 G/DL (ref 6.4–8.3)
PROT UR STRIP-MCNC: NEGATIVE MG/DL
RBC # BLD AUTO: 4.23 M/UL (ref 3.8–5.8)
RBC #/AREA URNS HPF: ABNORMAL /HPF
SALICYLATES SERPL-MCNC: <0.5 MG/DL (ref 0–30)
SODIUM SERPL-SCNC: 136 MMOL/L (ref 136–145)
SP GR UR STRIP: 1.02 (ref 1–1.03)
TEST INFORMATION: ABNORMAL
TOXIC TRICYCLIC SC,BLOOD: NEGATIVE
UROBILINOGEN UR STRIP-ACNC: 0.2 EU/DL (ref 0–1)
WBC #/AREA URNS HPF: ABNORMAL /HPF
WBC OTHER # BLD: 9.6 K/UL (ref 4.5–11.5)

## 2025-07-28 PROCEDURE — 80307 DRUG TEST PRSMV CHEM ANLYZR: CPT

## 2025-07-28 PROCEDURE — 73130 X-RAY EXAM OF HAND: CPT

## 2025-07-28 PROCEDURE — 80048 BASIC METABOLIC PNL TOTAL CA: CPT

## 2025-07-28 PROCEDURE — 71045 X-RAY EXAM CHEST 1 VIEW: CPT

## 2025-07-28 PROCEDURE — 80053 COMPREHEN METABOLIC PANEL: CPT

## 2025-07-28 PROCEDURE — G0480 DRUG TEST DEF 1-7 CLASSES: HCPCS

## 2025-07-28 PROCEDURE — 93005 ELECTROCARDIOGRAM TRACING: CPT

## 2025-07-28 PROCEDURE — 2580000003 HC RX 258: Performed by: STUDENT IN AN ORGANIZED HEALTH CARE EDUCATION/TRAINING PROGRAM

## 2025-07-28 PROCEDURE — 87086 URINE CULTURE/COLONY COUNT: CPT

## 2025-07-28 PROCEDURE — 2580000003 HC RX 258

## 2025-07-28 PROCEDURE — 80143 DRUG ASSAY ACETAMINOPHEN: CPT

## 2025-07-28 PROCEDURE — 85025 COMPLETE CBC W/AUTO DIFF WBC: CPT

## 2025-07-28 PROCEDURE — 80179 DRUG ASSAY SALICYLATE: CPT

## 2025-07-28 PROCEDURE — 81001 URINALYSIS AUTO W/SCOPE: CPT

## 2025-07-28 RX ORDER — 0.9 % SODIUM CHLORIDE 0.9 %
500 INTRAVENOUS SOLUTION INTRAVENOUS ONCE
Status: COMPLETED | OUTPATIENT
Start: 2025-07-28 | End: 2025-07-28

## 2025-07-28 RX ADMIN — SODIUM CHLORIDE 500 ML: 9 INJECTION, SOLUTION INTRAVENOUS at 20:23

## 2025-07-28 RX ADMIN — SODIUM CHLORIDE 500 ML: 9 INJECTION, SOLUTION INTRAVENOUS at 19:44

## 2025-07-28 NOTE — ED PROVIDER NOTES
[facility] EMERGENCY DEPARTMENT  EMERGENCY DEPARTMENT ENCOUNTER        Pt Name: Krish De Paz  MRN: [id number]  Birthdate 1955  Date of evaluation: 7/28/2025  Provider: Mir Prince MD  PCP: No primary care provider on file.  Note Started: 6:51 PM EDT 7/28/25    CHIEF COMPLAINT       Chief Complaint   Patient presents with    medical clearance     Pt needs medical clearance for [facility]- refused at this time due to hypotension       HISTORY OF PRESENT ILLNESS: 1 or more Elements        Limitations to history : None    Krish De Paz is a 70 y.o. male who presents to the emergency room for medical clearance.  Patient was refused from [facility] today due to low blood pressure.  Patient states he may have taken an extra dose of his blood pressure medication is not quite sure.  Denies any SI or HI, denies any hallucinations.  Denies any symptoms from the low blood pressure and believes it is secondary to urinating a lot.  Only other complaint is his right pinky he thinks may have been broken.  And has chronic sciatica in his right side.    Nursing Notes were all reviewed and agreed with or any disagreements were addressed in the HPI.      REVIEW OF EXTERNAL NOTE :       Admission 2/1/2017 patient admitted for hypoxemia, has a history of COPD    REVIEW OF SYSTEMS :      Positives and Pertinent negatives as per HPI.     SURGICAL HISTORY     Past Surgical History:   Procedure Laterality Date    BRONCHOSCOPY  11/30/2015    With BAL and Biopsies    THORACOSCOPY Right 12/04/15    with lung biopsy       CURRENTMEDICATIONS       Previous Medications    AMLODIPINE (NORVASC) 5 MG TABLET    Take 1 tablet by mouth daily    ARFORMOTEROL TARTRATE (BROVANA) 15 MCG/2ML NEBU    Take 2 mLs by nebulization 2 times daily    ASPIRIN 81 MG CHEWABLE TABLET    Take 1 tablet by mouth daily    BUDESONIDE (PULMICORT) 0.5 MG/2ML NEBULIZER SUSPENSION    Take 2 mLs by nebulization 2 times daily

## 2025-07-29 ENCOUNTER — APPOINTMENT (OUTPATIENT)
Dept: GENERAL RADIOLOGY | Age: 70
DRG: 391 | End: 2025-07-29
Payer: OTHER GOVERNMENT

## 2025-07-29 ENCOUNTER — HOSPITAL ENCOUNTER (INPATIENT)
Age: 70
LOS: 2 days | Discharge: HOME | DRG: 391 | End: 2025-08-01
Admitting: FAMILY MEDICINE
Payer: OTHER GOVERNMENT

## 2025-07-29 ENCOUNTER — APPOINTMENT (OUTPATIENT)
Dept: CT IMAGING | Age: 70
DRG: 391 | End: 2025-07-29
Payer: OTHER GOVERNMENT

## 2025-07-29 VITALS
OXYGEN SATURATION: 93 % | RESPIRATION RATE: 15 BRPM | DIASTOLIC BLOOD PRESSURE: 63 MMHG | SYSTOLIC BLOOD PRESSURE: 104 MMHG | TEMPERATURE: 98 F | HEART RATE: 68 BPM

## 2025-07-29 DIAGNOSIS — G89.29 ACUTE ON CHRONIC LOW BACK PAIN: ICD-10-CM

## 2025-07-29 DIAGNOSIS — R53.1 GENERAL WEAKNESS: ICD-10-CM

## 2025-07-29 DIAGNOSIS — R09.02 HYPOXEMIA: ICD-10-CM

## 2025-07-29 DIAGNOSIS — R06.02 SHORTNESS OF BREATH: ICD-10-CM

## 2025-07-29 DIAGNOSIS — F19.10 SUBSTANCE ABUSE (HCC): Primary | ICD-10-CM

## 2025-07-29 DIAGNOSIS — M54.50 ACUTE ON CHRONIC LOW BACK PAIN: ICD-10-CM

## 2025-07-29 LAB
ALBUMIN SERPL-MCNC: 3.8 G/DL (ref 3.5–5.2)
ALP SERPL-CCNC: 87 U/L (ref 40–129)
ALT SERPL-CCNC: 11 U/L (ref 0–50)
AMPHET UR QL SCN: NEGATIVE
ANION GAP SERPL CALCULATED.3IONS-SCNC: 13 MMOL/L (ref 7–16)
ANION GAP SERPL CALCULATED.3IONS-SCNC: 13 MMOL/L (ref 7–16)
APAP SERPL-MCNC: <5 UG/ML (ref 10–30)
AST SERPL-CCNC: 22 U/L (ref 0–50)
BARBITURATES UR QL SCN: NEGATIVE
BASOPHILS # BLD: 0.07 K/UL (ref 0–0.2)
BASOPHILS NFR BLD: 1 % (ref 0–2)
BENZODIAZ UR QL: POSITIVE
BILIRUB SERPL-MCNC: 0.2 MG/DL (ref 0–1.2)
BNP SERPL-MCNC: 89 PG/ML (ref 0–125)
BUN SERPL-MCNC: 28 MG/DL (ref 8–23)
BUN SERPL-MCNC: 33 MG/DL (ref 8–23)
BUPRENORPHINE UR QL: NEGATIVE
CALCIUM SERPL-MCNC: 8.9 MG/DL (ref 8.8–10.2)
CALCIUM SERPL-MCNC: 9.1 MG/DL (ref 8.8–10.2)
CANNABINOIDS UR QL SCN: NEGATIVE
CHLORIDE SERPL-SCNC: 102 MMOL/L (ref 98–107)
CHLORIDE SERPL-SCNC: 106 MMOL/L (ref 98–107)
CO2 SERPL-SCNC: 22 MMOL/L (ref 22–29)
CO2 SERPL-SCNC: 24 MMOL/L (ref 22–29)
COCAINE UR QL SCN: POSITIVE
CREAT SERPL-MCNC: 1 MG/DL (ref 0.7–1.2)
CREAT SERPL-MCNC: 1.8 MG/DL (ref 0.7–1.2)
EOSINOPHIL # BLD: 0.51 K/UL (ref 0.05–0.5)
EOSINOPHILS RELATIVE PERCENT: 6 % (ref 0–6)
ERYTHROCYTE [DISTWIDTH] IN BLOOD BY AUTOMATED COUNT: 15.2 % (ref 11.5–15)
ETHANOLAMINE SERPL-MCNC: <10 MG/DL (ref 0–0.08)
FENTANYL UR QL: POSITIVE
GFR, ESTIMATED: 39 ML/MIN/1.73M2
GFR, ESTIMATED: 79 ML/MIN/1.73M2
GLUCOSE SERPL-MCNC: 126 MG/DL (ref 74–99)
GLUCOSE SERPL-MCNC: 94 MG/DL (ref 74–99)
HCT VFR BLD AUTO: 40.8 % (ref 37–54)
HGB BLD-MCNC: 13.3 G/DL (ref 12.5–16.5)
IMM GRANULOCYTES # BLD AUTO: 0.04 K/UL (ref 0–0.58)
IMM GRANULOCYTES NFR BLD: 0 % (ref 0–5)
LYMPHOCYTES NFR BLD: 2.16 K/UL (ref 1.5–4)
LYMPHOCYTES RELATIVE PERCENT: 24 % (ref 20–42)
MCH RBC QN AUTO: 31.8 PG (ref 26–35)
MCHC RBC AUTO-ENTMCNC: 32.6 G/DL (ref 32–34.5)
MCV RBC AUTO: 97.6 FL (ref 80–99.9)
METHADONE UR QL: POSITIVE
MICROORGANISM SPEC CULT: ABNORMAL
MONOCYTES NFR BLD: 0.82 K/UL (ref 0.1–0.95)
MONOCYTES NFR BLD: 9 % (ref 2–12)
NEUTROPHILS NFR BLD: 60 % (ref 43–80)
NEUTS SEG NFR BLD: 5.36 K/UL (ref 1.8–7.3)
OPIATES UR QL SCN: NEGATIVE
OXYCODONE UR QL SCN: NEGATIVE
PCP UR QL SCN: NEGATIVE
PLATELET # BLD AUTO: 249 K/UL (ref 130–450)
PMV BLD AUTO: 9.5 FL (ref 7–12)
POTASSIUM SERPL-SCNC: 3.8 MMOL/L (ref 3.5–5.1)
POTASSIUM SERPL-SCNC: 4.2 MMOL/L (ref 3.5–5.1)
PROT SERPL-MCNC: 7.5 G/DL (ref 6.4–8.3)
RBC # BLD AUTO: 4.18 M/UL (ref 3.8–5.8)
SALICYLATES SERPL-MCNC: <0.5 MG/DL (ref 0–30)
SERVICE CMNT-IMP: ABNORMAL
SODIUM SERPL-SCNC: 140 MMOL/L (ref 136–145)
SODIUM SERPL-SCNC: 141 MMOL/L (ref 136–145)
SPECIMEN DESCRIPTION: ABNORMAL
TEST INFORMATION: ABNORMAL
TOXIC TRICYCLIC SC,BLOOD: NEGATIVE
TROPONIN I SERPL HS-MCNC: 15 NG/L (ref 0–22)
TROPONIN I SERPL HS-MCNC: 19 NG/L (ref 0–22)
WBC OTHER # BLD: 9 K/UL (ref 4.5–11.5)

## 2025-07-29 PROCEDURE — 93005 ELECTROCARDIOGRAM TRACING: CPT

## 2025-07-29 PROCEDURE — 80143 DRUG ASSAY ACETAMINOPHEN: CPT

## 2025-07-29 PROCEDURE — 84484 ASSAY OF TROPONIN QUANT: CPT

## 2025-07-29 PROCEDURE — 71275 CT ANGIOGRAPHY CHEST: CPT

## 2025-07-29 PROCEDURE — 71045 X-RAY EXAM CHEST 1 VIEW: CPT

## 2025-07-29 PROCEDURE — 80053 COMPREHEN METABOLIC PANEL: CPT

## 2025-07-29 PROCEDURE — 80179 DRUG ASSAY SALICYLATE: CPT

## 2025-07-29 PROCEDURE — 85025 COMPLETE CBC W/AUTO DIFF WBC: CPT

## 2025-07-29 PROCEDURE — G0480 DRUG TEST DEF 1-7 CLASSES: HCPCS

## 2025-07-29 PROCEDURE — 2580000003 HC RX 258

## 2025-07-29 PROCEDURE — 80307 DRUG TEST PRSMV CHEM ANLYZR: CPT

## 2025-07-29 PROCEDURE — 83880 ASSAY OF NATRIURETIC PEPTIDE: CPT

## 2025-07-29 PROCEDURE — 99285 EMERGENCY DEPT VISIT HI MDM: CPT

## 2025-07-29 PROCEDURE — 74177 CT ABD & PELVIS W/CONTRAST: CPT

## 2025-07-29 RX ORDER — IOPAMIDOL 755 MG/ML
75 INJECTION, SOLUTION INTRAVASCULAR
Status: COMPLETED | OUTPATIENT
Start: 2025-07-29 | End: 2025-07-30

## 2025-07-29 RX ORDER — 0.9 % SODIUM CHLORIDE 0.9 %
1000 INTRAVENOUS SOLUTION INTRAVENOUS ONCE
Status: COMPLETED | OUTPATIENT
Start: 2025-07-29 | End: 2025-07-29

## 2025-07-29 RX ADMIN — SODIUM CHLORIDE 1000 ML: 0.9 INJECTION, SOLUTION INTRAVENOUS at 18:06

## 2025-07-29 ASSESSMENT — LIFESTYLE VARIABLES: HOW OFTEN DO YOU HAVE A DRINK CONTAINING ALCOHOL: NEVER
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
## 2025-07-29 NOTE — DISCHARGE INSTRUCTIONS
A Quick Guide to Finding   Effective Alcohol and Drug Addiction Treatment  For a complete list of treatment centers in [facility] please vist the “[facility]  [place]” website  Http://[email or web address]/Toolkits/Patients    If you or someone you care for is dependent on alcohol or drugs and needs treatment, it is important to know this; no single treatment approach is appropriate for all individuals. Finding the right treatment program involves careful consideration of such things as:  Setting.  Length of care. Philosophical approach.  You or your loved one's needs.   12 QUESTIONS TO CONSIDER WHEN SELECTING A TREATMENT PROGRAM:  Does the program accept your insurance? If not:  Will it work with you on a payment plan? Will it find other means of support for you?  Is the program run by state-accredited, licensed and/or trained professionals?  Is the facility clean, organized and well-run?  Does the program meet the full range of needs of the individual?  Medical, including infectious diseases.  Psychological, including co-occurring mental illness.  Social, vocational, legal. etc.  Does the treatment program also address sexual orientation and physical disabilities as well as provide age, gender and culturally appropriate treatment services?  Is long-term aftercare support and/or guidance encouraged and provided?  Is assessment of an individual's treatment plan ongoing to ensure it meets changing needs?  Does the program use strategies to engage and keep individuals in longer-term treatment? This would increase the likelihood of success?  Does the program offer counseling (individual or group) and other behavioral therapies to enhance the individual's ability to function in the family/community?  Does the program offer medication as part of the treatment regimen, if needed?  Is there ongoing monitoring of possible relapse? This would help guide patients back to abstinence.  Are services or referrals offered to family  members to ensure they understand addiction and the recovery process? This would help them support the recovering individual.  The [facility] ([facility] for [facility]stance Abuse Treatment) provides a toll-free number. It is a 24-hour treatment referral service. It can help you locate treatment options near you.   Call this number for a referral to a treatment center or support group in your area.   (1-494.964.7894)  1-332.707.6560 (TDD) http://findtreatment.[facility].gov

## 2025-07-29 NOTE — ED NOTES
Nurse to nurse called to [facility]. Instructed by nurse to take the patient to the [facility] with d/c papers.

## 2025-07-30 ENCOUNTER — APPOINTMENT (OUTPATIENT)
Age: 70
DRG: 391 | End: 2025-07-30
Attending: FAMILY MEDICINE
Payer: OTHER GOVERNMENT

## 2025-07-30 PROBLEM — R63.4 UNINTENTIONAL WEIGHT LOSS: Status: ACTIVE | Noted: 2025-07-30

## 2025-07-30 PROBLEM — J96.01 ACUTE HYPOXIC RESPIRATORY FAILURE (HCC): Status: ACTIVE | Noted: 2025-07-30

## 2025-07-30 PROBLEM — J96.01 ACUTE RESPIRATORY FAILURE WITH HYPOXIA (HCC): Status: ACTIVE | Noted: 2025-07-30

## 2025-07-30 PROBLEM — J44.1 COPD EXACERBATION (HCC): Status: ACTIVE | Noted: 2025-07-30

## 2025-07-30 LAB
B PARAP IS1001 DNA NPH QL NAA+NON-PROBE: NOT DETECTED
B PERT DNA SPEC QL NAA+PROBE: NOT DETECTED
C PNEUM DNA NPH QL NAA+NON-PROBE: NOT DETECTED
ECHO AO ASC DIAM: 3.1 CM
ECHO AO ASCENDING AORTA INDEX: 1.61 CM/M2
ECHO AV AREA PEAK VELOCITY: 2.6 CM2
ECHO AV AREA VTI: 2.7 CM2
ECHO AV AREA/BSA PEAK VELOCITY: 1.4 CM2/M2
ECHO AV AREA/BSA VTI: 1.4 CM2/M2
ECHO AV CUSP MM: 2.4 CM
ECHO AV MEAN GRADIENT: 5 MMHG
ECHO AV MEAN VELOCITY: 1.1 M/S
ECHO AV PEAK GRADIENT: 10 MMHG
ECHO AV PEAK VELOCITY: 1.6 M/S
ECHO AV VELOCITY RATIO: 0.81
ECHO AV VTI: 27.7 CM
ECHO BSA: 1.9 M2
ECHO EST RA PRESSURE: 3 MMHG
ECHO LA DIAMETER INDEX: 1.82 CM/M2
ECHO LA DIAMETER: 3.5 CM
ECHO LA VOL A-L A2C: 33 ML (ref 18–58)
ECHO LA VOL A-L A4C: 31 ML (ref 18–58)
ECHO LA VOL BP: 29 ML (ref 18–58)
ECHO LA VOL MOD A2C: 29 ML (ref 18–58)
ECHO LA VOL MOD A4C: 29 ML (ref 18–58)
ECHO LA VOL/BSA BIPLANE: 15 ML/M2 (ref 16–34)
ECHO LA VOLUME AREA LENGTH: 32 ML
ECHO LA VOLUME INDEX A-L A2C: 17 ML/M2 (ref 16–34)
ECHO LA VOLUME INDEX A-L A4C: 16 ML/M2 (ref 16–34)
ECHO LA VOLUME INDEX AREA LENGTH: 17 ML/M2 (ref 16–34)
ECHO LA VOLUME INDEX MOD A2C: 15 ML/M2 (ref 16–34)
ECHO LA VOLUME INDEX MOD A4C: 15 ML/M2 (ref 16–34)
ECHO LV EF PHYSICIAN: 65 %
ECHO LV FRACTIONAL SHORTENING: 43 % (ref 28–44)
ECHO LV INTERNAL DIMENSION DIASTOLE INDEX: 2.29 CM/M2
ECHO LV INTERNAL DIMENSION DIASTOLIC: 4.4 CM (ref 4.2–5.9)
ECHO LV INTERNAL DIMENSION SYSTOLIC INDEX: 1.3 CM/M2
ECHO LV INTERNAL DIMENSION SYSTOLIC: 2.5 CM
ECHO LV IVSD: 1.1 CM (ref 0.6–1)
ECHO LV IVSS: 1.2 CM
ECHO LV MASS 2D: 137.8 G (ref 88–224)
ECHO LV MASS INDEX 2D: 71.8 G/M2 (ref 49–115)
ECHO LV POSTERIOR WALL DIASTOLIC: 0.8 CM (ref 0.6–1)
ECHO LV POSTERIOR WALL SYSTOLIC: 0.9 CM
ECHO LV RELATIVE WALL THICKNESS RATIO: 0.36
ECHO LVOT AREA: 3.1 CM2
ECHO LVOT AV VTI INDEX: 0.87
ECHO LVOT DIAM: 2 CM
ECHO LVOT MEAN GRADIENT: 4 MMHG
ECHO LVOT PEAK GRADIENT: 6 MMHG
ECHO LVOT PEAK VELOCITY: 1.3 M/S
ECHO LVOT STROKE VOLUME INDEX: 39.6 ML/M2
ECHO LVOT SV: 76 ML
ECHO LVOT VTI: 24.2 CM
ECHO MV "A" WAVE DURATION: 114.2 MSEC
ECHO MV A VELOCITY: 0.62 M/S
ECHO MV AREA PHT: 3.5 CM2
ECHO MV AREA VTI: 2.6 CM2
ECHO MV E DECELERATION TIME (DT): 189.5 MS
ECHO MV E VELOCITY: 0.81 M/S
ECHO MV E/A RATIO: 1.31
ECHO MV LVOT VTI INDEX: 1.19
ECHO MV MAX VELOCITY: 1.1 M/S
ECHO MV MEAN GRADIENT: 2 MMHG
ECHO MV MEAN VELOCITY: 0.7 M/S
ECHO MV PEAK GRADIENT: 4 MMHG
ECHO MV PRESSURE HALF TIME (PHT): 62.9 MS
ECHO MV VTI: 28.8 CM
ECHO PV MAX VELOCITY: 1 M/S
ECHO PV MEAN GRADIENT: 3 MMHG
ECHO PV MEAN VELOCITY: 0.8 M/S
ECHO PV PEAK GRADIENT: 4 MMHG
ECHO PV VTI: 20.6 CM
ECHO PVEIN A DURATION: 111.3 MS
ECHO PVEIN A VELOCITY: 0.4 M/S
ECHO PVEIN PEAK D VELOCITY: 0.5 M/S
ECHO PVEIN PEAK S VELOCITY: 0.7 M/S
ECHO PVEIN S/D RATIO: 1.4
ECHO RIGHT VENTRICULAR SYSTOLIC PRESSURE (RVSP): 46 MMHG
ECHO RV INTERNAL DIMENSION: 3.7 CM
ECHO RV TAPSE: 1.4 CM (ref 1.7–?)
ECHO TV REGURGITANT MAX VELOCITY: 3.29 M/S
ECHO TV REGURGITANT PEAK GRADIENT: 43 MMHG
FLUAV RNA NPH QL NAA+NON-PROBE: NOT DETECTED
FLUBV RNA NPH QL NAA+NON-PROBE: NOT DETECTED
HADV DNA NPH QL NAA+NON-PROBE: NOT DETECTED
HCOV 229E RNA NPH QL NAA+NON-PROBE: NOT DETECTED
HCOV HKU1 RNA NPH QL NAA+NON-PROBE: NOT DETECTED
HCOV NL63 RNA NPH QL NAA+NON-PROBE: NOT DETECTED
HCOV OC43 RNA NPH QL NAA+NON-PROBE: NOT DETECTED
HMPV RNA NPH QL NAA+NON-PROBE: NOT DETECTED
HPIV1 RNA NPH QL NAA+NON-PROBE: NOT DETECTED
HPIV2 RNA NPH QL NAA+NON-PROBE: NOT DETECTED
HPIV3 RNA NPH QL NAA+NON-PROBE: NOT DETECTED
HPIV4 RNA NPH QL NAA+NON-PROBE: NOT DETECTED
M PNEUMO DNA NPH QL NAA+NON-PROBE: NOT DETECTED
PROCALCITONIN SERPL-MCNC: 3.29 NG/ML (ref 0–0.08)
RSV RNA NPH QL NAA+NON-PROBE: NOT DETECTED
RV+EV RNA NPH QL NAA+NON-PROBE: NOT DETECTED
SARS-COV-2 RNA NPH QL NAA+NON-PROBE: NOT DETECTED
SPECIMEN DESCRIPTION: NORMAL

## 2025-07-30 PROCEDURE — 6360000002 HC RX W HCPCS: Performed by: STUDENT IN AN ORGANIZED HEALTH CARE EDUCATION/TRAINING PROGRAM

## 2025-07-30 PROCEDURE — 87449 NOS EACH ORGANISM AG IA: CPT

## 2025-07-30 PROCEDURE — 2060000000 HC ICU INTERMEDIATE R&B

## 2025-07-30 PROCEDURE — 6370000000 HC RX 637 (ALT 250 FOR IP): Performed by: FAMILY MEDICINE

## 2025-07-30 PROCEDURE — 6370000000 HC RX 637 (ALT 250 FOR IP): Performed by: NURSE PRACTITIONER

## 2025-07-30 PROCEDURE — 2500000003 HC RX 250 WO HCPCS: Performed by: FAMILY MEDICINE

## 2025-07-30 PROCEDURE — 94640 AIRWAY INHALATION TREATMENT: CPT

## 2025-07-30 PROCEDURE — 6360000002 HC RX W HCPCS: Performed by: FAMILY MEDICINE

## 2025-07-30 PROCEDURE — 87077 CULTURE AEROBIC IDENTIFY: CPT

## 2025-07-30 PROCEDURE — 6360000002 HC RX W HCPCS: Performed by: EMERGENCY MEDICINE

## 2025-07-30 PROCEDURE — 82104 ALPHA-1-ANTITRYPSIN PHENO: CPT

## 2025-07-30 PROCEDURE — 87899 AGENT NOS ASSAY W/OPTIC: CPT

## 2025-07-30 PROCEDURE — 6370000000 HC RX 637 (ALT 250 FOR IP): Performed by: STUDENT IN AN ORGANIZED HEALTH CARE EDUCATION/TRAINING PROGRAM

## 2025-07-30 PROCEDURE — 84145 PROCALCITONIN (PCT): CPT

## 2025-07-30 PROCEDURE — 87045 FECES CULTURE AEROBIC BACT: CPT

## 2025-07-30 PROCEDURE — 82103 ALPHA-1-ANTITRYPSIN TOTAL: CPT

## 2025-07-30 PROCEDURE — 6360000004 HC RX CONTRAST MEDICATION: Performed by: RADIOLOGY

## 2025-07-30 PROCEDURE — 87324 CLOSTRIDIUM AG IA: CPT

## 2025-07-30 PROCEDURE — 87427 SHIGA-LIKE TOXIN AG IA: CPT

## 2025-07-30 PROCEDURE — 0202U NFCT DS 22 TRGT SARS-COV-2: CPT

## 2025-07-30 PROCEDURE — 87046 STOOL CULTR AEROBIC BACT EA: CPT

## 2025-07-30 PROCEDURE — 2580000003 HC RX 258: Performed by: FAMILY MEDICINE

## 2025-07-30 PROCEDURE — 6370000000 HC RX 637 (ALT 250 FOR IP): Performed by: EMERGENCY MEDICINE

## 2025-07-30 PROCEDURE — 2500000003 HC RX 250 WO HCPCS: Performed by: EMERGENCY MEDICINE

## 2025-07-30 PROCEDURE — 93306 TTE W/DOPPLER COMPLETE: CPT

## 2025-07-30 RX ORDER — ARFORMOTEROL TARTRATE 15 UG/2ML
15 SOLUTION RESPIRATORY (INHALATION) 2 TIMES DAILY
Status: DISCONTINUED | OUTPATIENT
Start: 2025-07-30 | End: 2025-08-01 | Stop reason: HOSPADM

## 2025-07-30 RX ORDER — SODIUM CHLORIDE 9 MG/ML
INJECTION, SOLUTION INTRAVENOUS CONTINUOUS
Status: ACTIVE | OUTPATIENT
Start: 2025-07-30 | End: 2025-07-31

## 2025-07-30 RX ORDER — GUAIFENESIN 400 MG/1
400 TABLET ORAL 3 TIMES DAILY
Status: DISCONTINUED | OUTPATIENT
Start: 2025-07-30 | End: 2025-08-01 | Stop reason: HOSPADM

## 2025-07-30 RX ORDER — ACETAMINOPHEN 650 MG/1
650 SUPPOSITORY RECTAL EVERY 6 HOURS PRN
Status: DISCONTINUED | OUTPATIENT
Start: 2025-07-30 | End: 2025-08-01 | Stop reason: HOSPADM

## 2025-07-30 RX ORDER — SODIUM CHLORIDE 0.9 % (FLUSH) 0.9 %
5-40 SYRINGE (ML) INJECTION PRN
Status: DISCONTINUED | OUTPATIENT
Start: 2025-07-30 | End: 2025-08-01 | Stop reason: HOSPADM

## 2025-07-30 RX ORDER — ALBUTEROL SULFATE 0.83 MG/ML
2.5 SOLUTION RESPIRATORY (INHALATION) EVERY 6 HOURS PRN
Status: DISCONTINUED | OUTPATIENT
Start: 2025-07-30 | End: 2025-08-01 | Stop reason: HOSPADM

## 2025-07-30 RX ORDER — DIPHENOXYLATE HYDROCHLORIDE AND ATROPINE SULFATE 2.5; .025 MG/1; MG/1
1 TABLET ORAL ONCE
Status: COMPLETED | OUTPATIENT
Start: 2025-07-30 | End: 2025-07-30

## 2025-07-30 RX ORDER — ONDANSETRON 4 MG/1
4 TABLET, ORALLY DISINTEGRATING ORAL EVERY 8 HOURS PRN
Status: DISCONTINUED | OUTPATIENT
Start: 2025-07-30 | End: 2025-08-01 | Stop reason: HOSPADM

## 2025-07-30 RX ORDER — PREDNISONE 20 MG/1
40 TABLET ORAL DAILY
Qty: 6 TABLET | Refills: 0 | Status: SHIPPED | OUTPATIENT
Start: 2025-08-01 | End: 2025-07-31

## 2025-07-30 RX ORDER — ACETAMINOPHEN 325 MG/1
650 TABLET ORAL EVERY 6 HOURS PRN
Status: DISCONTINUED | OUTPATIENT
Start: 2025-07-30 | End: 2025-08-01 | Stop reason: HOSPADM

## 2025-07-30 RX ORDER — GUAIFENESIN 400 MG/1
400 TABLET ORAL 3 TIMES DAILY
Qty: 56 TABLET | Refills: 0 | Status: SHIPPED | OUTPATIENT
Start: 2025-07-30 | End: 2025-07-31

## 2025-07-30 RX ORDER — ENOXAPARIN SODIUM 100 MG/ML
40 INJECTION SUBCUTANEOUS DAILY
Status: DISCONTINUED | OUTPATIENT
Start: 2025-07-30 | End: 2025-08-01 | Stop reason: HOSPADM

## 2025-07-30 RX ORDER — METHADONE HYDROCHLORIDE 10 MG/ML
135 CONCENTRATE ORAL DAILY
Refills: 0 | Status: DISCONTINUED | OUTPATIENT
Start: 2025-07-30 | End: 2025-08-01 | Stop reason: HOSPADM

## 2025-07-30 RX ORDER — IPRATROPIUM BROMIDE AND ALBUTEROL SULFATE 2.5; .5 MG/3ML; MG/3ML
3 SOLUTION RESPIRATORY (INHALATION) EVERY 4 HOURS PRN
Qty: 360 ML | Refills: 3 | Status: SHIPPED | OUTPATIENT
Start: 2025-07-30 | End: 2025-07-31

## 2025-07-30 RX ORDER — SODIUM CHLORIDE 9 MG/ML
INJECTION, SOLUTION INTRAVENOUS PRN
Status: DISCONTINUED | OUTPATIENT
Start: 2025-07-30 | End: 2025-08-01 | Stop reason: HOSPADM

## 2025-07-30 RX ORDER — ASPIRIN 81 MG/1
81 TABLET, CHEWABLE ORAL DAILY
Status: DISCONTINUED | OUTPATIENT
Start: 2025-07-30 | End: 2025-08-01 | Stop reason: HOSPADM

## 2025-07-30 RX ORDER — BENZONATATE 100 MG/1
100 CAPSULE ORAL 3 TIMES DAILY PRN
Status: DISCONTINUED | OUTPATIENT
Start: 2025-07-30 | End: 2025-08-01 | Stop reason: HOSPADM

## 2025-07-30 RX ORDER — CETIRIZINE HYDROCHLORIDE 10 MG/1
10 TABLET ORAL DAILY
Status: DISCONTINUED | OUTPATIENT
Start: 2025-07-30 | End: 2025-08-01 | Stop reason: HOSPADM

## 2025-07-30 RX ORDER — FLUTICASONE PROPIONATE 50 MCG
1 SPRAY, SUSPENSION (ML) NASAL DAILY
Status: DISCONTINUED | OUTPATIENT
Start: 2025-07-30 | End: 2025-08-01 | Stop reason: HOSPADM

## 2025-07-30 RX ORDER — IPRATROPIUM BROMIDE AND ALBUTEROL SULFATE 2.5; .5 MG/3ML; MG/3ML
1 SOLUTION RESPIRATORY (INHALATION) ONCE
Status: COMPLETED | OUTPATIENT
Start: 2025-07-30 | End: 2025-07-30

## 2025-07-30 RX ORDER — IPRATROPIUM BROMIDE AND ALBUTEROL SULFATE 2.5; .5 MG/3ML; MG/3ML
1 SOLUTION RESPIRATORY (INHALATION)
Status: DISCONTINUED | OUTPATIENT
Start: 2025-07-30 | End: 2025-08-01 | Stop reason: HOSPADM

## 2025-07-30 RX ORDER — DIPHENOXYLATE HCL/ATROPINE 2.5-.025/5
5 LIQUID (ML) ORAL ONCE
Status: DISCONTINUED | OUTPATIENT
Start: 2025-07-30 | End: 2025-07-30 | Stop reason: CLARIF

## 2025-07-30 RX ORDER — NICOTINE 21 MG/24HR
1 PATCH, TRANSDERMAL 24 HOURS TRANSDERMAL DAILY
Qty: 30 PATCH | Refills: 3 | Status: SHIPPED | OUTPATIENT
Start: 2025-07-31 | End: 2025-07-31

## 2025-07-30 RX ORDER — PREDNISONE 20 MG/1
40 TABLET ORAL DAILY
Status: DISCONTINUED | OUTPATIENT
Start: 2025-08-01 | End: 2025-08-01 | Stop reason: HOSPADM

## 2025-07-30 RX ORDER — CETIRIZINE HYDROCHLORIDE 10 MG/1
10 TABLET ORAL DAILY
Qty: 30 TABLET | Refills: 0 | Status: SHIPPED | OUTPATIENT
Start: 2025-07-30 | End: 2025-07-31

## 2025-07-30 RX ORDER — NICOTINE 21 MG/24HR
1 PATCH, TRANSDERMAL 24 HOURS TRANSDERMAL DAILY
Status: DISCONTINUED | OUTPATIENT
Start: 2025-07-30 | End: 2025-08-01 | Stop reason: HOSPADM

## 2025-07-30 RX ORDER — BUDESONIDE 0.5 MG/2ML
500 INHALANT ORAL
Status: DISCONTINUED | OUTPATIENT
Start: 2025-07-30 | End: 2025-08-01 | Stop reason: HOSPADM

## 2025-07-30 RX ORDER — FLUTICASONE FUROATE, UMECLIDINIUM BROMIDE AND VILANTEROL TRIFENATATE 100; 62.5; 25 UG/1; UG/1; UG/1
1 POWDER RESPIRATORY (INHALATION) DAILY
Qty: 1 EACH | Refills: 3 | Status: SHIPPED | OUTPATIENT
Start: 2025-07-30 | End: 2025-08-01

## 2025-07-30 RX ORDER — HYDROXYZINE HYDROCHLORIDE 25 MG/1
25 TABLET, FILM COATED ORAL 3 TIMES DAILY PRN
COMMUNITY

## 2025-07-30 RX ORDER — SODIUM CHLORIDE 0.9 % (FLUSH) 0.9 %
5-40 SYRINGE (ML) INJECTION EVERY 12 HOURS SCHEDULED
Status: DISCONTINUED | OUTPATIENT
Start: 2025-07-30 | End: 2025-08-01 | Stop reason: HOSPADM

## 2025-07-30 RX ORDER — POLYETHYLENE GLYCOL 3350 17 G/17G
17 POWDER, FOR SOLUTION ORAL DAILY PRN
Status: DISCONTINUED | OUTPATIENT
Start: 2025-07-30 | End: 2025-08-01 | Stop reason: HOSPADM

## 2025-07-30 RX ORDER — ONDANSETRON 2 MG/ML
4 INJECTION INTRAMUSCULAR; INTRAVENOUS EVERY 6 HOURS PRN
Status: DISCONTINUED | OUTPATIENT
Start: 2025-07-30 | End: 2025-08-01 | Stop reason: HOSPADM

## 2025-07-30 RX ORDER — LORATADINE 10 MG/1
10 TABLET ORAL DAILY PRN
COMMUNITY
End: 2025-07-30 | Stop reason: HOSPADM

## 2025-07-30 RX ORDER — PANTOPRAZOLE SODIUM 40 MG/1
40 TABLET, DELAYED RELEASE ORAL
Status: DISCONTINUED | OUTPATIENT
Start: 2025-07-31 | End: 2025-08-01 | Stop reason: HOSPADM

## 2025-07-30 RX ORDER — SUCRALFATE 1 G/1
1 TABLET ORAL 4 TIMES DAILY
Status: DISCONTINUED | OUTPATIENT
Start: 2025-07-30 | End: 2025-08-01 | Stop reason: HOSPADM

## 2025-07-30 RX ORDER — METHADONE HYDROCHLORIDE 10 MG/ML
135 CONCENTRATE ORAL DAILY
COMMUNITY

## 2025-07-30 RX ADMIN — ARFORMOTEROL TARTRATE 15 MCG: 15 SOLUTION RESPIRATORY (INHALATION) at 10:00

## 2025-07-30 RX ADMIN — SUCRALFATE 1 G: 1 TABLET ORAL at 21:21

## 2025-07-30 RX ADMIN — IPRATROPIUM BROMIDE AND ALBUTEROL SULFATE 1 DOSE: .5; 3 SOLUTION RESPIRATORY (INHALATION) at 02:33

## 2025-07-30 RX ADMIN — ACETAMINOPHEN 650 MG: 325 TABLET ORAL at 21:21

## 2025-07-30 RX ADMIN — GUAIFENESIN 400 MG: 400 TABLET ORAL at 17:47

## 2025-07-30 RX ADMIN — WATER 40 MG: 1 INJECTION INTRAMUSCULAR; INTRAVENOUS; SUBCUTANEOUS at 10:20

## 2025-07-30 RX ADMIN — IPRATROPIUM BROMIDE AND ALBUTEROL SULFATE 1 DOSE: .5; 3 SOLUTION RESPIRATORY (INHALATION) at 19:45

## 2025-07-30 RX ADMIN — WATER 40 MG: 1 INJECTION INTRAMUSCULAR; INTRAVENOUS; SUBCUTANEOUS at 17:44

## 2025-07-30 RX ADMIN — DIPHENOXYLATE HYDROCHLORIDE AND ATROPINE SULFATE 1 TABLET: 2.5; .025 TABLET ORAL at 11:00

## 2025-07-30 RX ADMIN — IPRATROPIUM BROMIDE AND ALBUTEROL SULFATE 1 DOSE: .5; 3 SOLUTION RESPIRATORY (INHALATION) at 14:38

## 2025-07-30 RX ADMIN — BUDESONIDE 500 MCG: 0.5 SUSPENSION RESPIRATORY (INHALATION) at 19:45

## 2025-07-30 RX ADMIN — IPRATROPIUM BROMIDE AND ALBUTEROL SULFATE 1 DOSE: .5; 3 SOLUTION RESPIRATORY (INHALATION) at 17:22

## 2025-07-30 RX ADMIN — CETIRIZINE HYDROCHLORIDE 10 MG: 10 TABLET, FILM COATED ORAL at 12:57

## 2025-07-30 RX ADMIN — ARFORMOTEROL TARTRATE 15 MCG: 15 SOLUTION RESPIRATORY (INHALATION) at 19:45

## 2025-07-30 RX ADMIN — SODIUM CHLORIDE: 0.9 INJECTION, SOLUTION INTRAVENOUS at 03:33

## 2025-07-30 RX ADMIN — IPRATROPIUM BROMIDE AND ALBUTEROL SULFATE 1 DOSE: .5; 2.5 SOLUTION RESPIRATORY (INHALATION) at 02:34

## 2025-07-30 RX ADMIN — Medication 135 MG: at 10:21

## 2025-07-30 RX ADMIN — ASPIRIN 81 MG: 81 TABLET, CHEWABLE ORAL at 10:21

## 2025-07-30 RX ADMIN — IPRATROPIUM BROMIDE AND ALBUTEROL SULFATE 1 DOSE: .5; 3 SOLUTION RESPIRATORY (INHALATION) at 02:41

## 2025-07-30 RX ADMIN — GUAIFENESIN 400 MG: 400 TABLET ORAL at 21:22

## 2025-07-30 RX ADMIN — ENOXAPARIN SODIUM 40 MG: 100 INJECTION SUBCUTANEOUS at 10:20

## 2025-07-30 RX ADMIN — WATER 125 MG: 1 INJECTION INTRAMUSCULAR; INTRAVENOUS; SUBCUTANEOUS at 01:46

## 2025-07-30 RX ADMIN — SODIUM CHLORIDE: 0.9 INJECTION, SOLUTION INTRAVENOUS at 18:07

## 2025-07-30 RX ADMIN — IOPAMIDOL 75 ML: 755 INJECTION, SOLUTION INTRAVENOUS at 00:07

## 2025-07-30 RX ADMIN — IPRATROPIUM BROMIDE AND ALBUTEROL SULFATE 1 DOSE: .5; 3 SOLUTION RESPIRATORY (INHALATION) at 09:58

## 2025-07-30 RX ADMIN — GUAIFENESIN 400 MG: 400 TABLET ORAL at 10:21

## 2025-07-30 RX ADMIN — BUDESONIDE 500 MCG: 0.5 SUSPENSION RESPIRATORY (INHALATION) at 10:01

## 2025-07-30 RX ADMIN — SODIUM CHLORIDE, PRESERVATIVE FREE 10 ML: 5 INJECTION INTRAVENOUS at 10:23

## 2025-07-30 RX ADMIN — SUCRALFATE 1 G: 1 TABLET ORAL at 12:57

## 2025-07-30 RX ADMIN — SUCRALFATE 1 G: 1 TABLET ORAL at 17:44

## 2025-07-30 ASSESSMENT — LIFESTYLE VARIABLES
HOW OFTEN DO YOU HAVE A DRINK CONTAINING ALCOHOL: PATIENT UNABLE TO ANSWER
HOW MANY STANDARD DRINKS CONTAINING ALCOHOL DO YOU HAVE ON A TYPICAL DAY: PATIENT UNABLE TO ANSWER

## 2025-07-30 ASSESSMENT — PAIN SCALES - WONG BAKER
WONGBAKER_NUMERICALRESPONSE: NO HURT

## 2025-07-30 ASSESSMENT — PAIN SCALES - GENERAL
PAINLEVEL_OUTOF10: 0

## 2025-07-30 NOTE — PROGRESS NOTES
Per Dr. Luann TREJO for palliative consult.  Electronically signed by Yareli Harris RN on 7/30/2025 at 5:58 PM

## 2025-07-30 NOTE — PLAN OF CARE
Problem: Chronic Conditions and Co-morbidities  Goal: Patient's chronic conditions and co-morbidity symptoms are monitored and maintained or improved  Outcome: Progressing     Problem: Discharge Planning  Goal: Discharge to home or other facility with appropriate resources  Outcome: Progressing     Problem: Safety - Adult  Goal: Free from fall injury  Outcome: Progressing     Problem: Respiratory - Adult  Goal: Achieves optimal ventilation and oxygenation  Outcome: Progressing     Problem: Musculoskeletal - Adult  Goal: Return mobility to safest level of function  Outcome: Progressing  Goal: Maintain proper alignment of affected body part  Outcome: Progressing  Goal: Return ADL status to a safe level of function  Outcome: Progressing

## 2025-07-30 NOTE — PROGRESS NOTES
Chart accessed for [facility] admission     Electronically signed by Zahra Smiley RN on 7/30/2025 at 3:15 PM

## 2025-07-30 NOTE — DISCHARGE INSTRUCTIONS
[facility] as [facility] as possible upon discharge from the ED.   If you have any sudden worsening of symptoms such as uncontrolled pain, fevers, chills, unremitting nausea, or vomiting, please return to the ED for further evaluation.

## 2025-07-30 NOTE — CARE COORDINATION
07/30/25 Case Management Note: Chart reviewed due to patient returning to the ER multiple times in last 30 days. Patient has been back multiple times for various complaints. He is homeless.He has been back and forth to [facility] in the past. [facility] is also a . [facility] [address] Currently he is being assessed in the ER and will be admitted inpatient per the orders. Electronically signed by Luzmaria Deutsch RN CM on 7/30/2025 at 7:45 AM

## 2025-07-30 NOTE — PROGRESS NOTES
Oncology consult called to Dr Dowell     Electronically signed by Zahra Smiley RN on 7/30/2025 at 5:33 PM

## 2025-07-30 NOTE — H&P
Hospital Medicine History & Physical      PCP: No primary care provider on file.    Date of Admission: 7/29/2025    Date of Service: Pt seen/examined on 7/30/2025 and Admitted to Inpatient with expected LOS greater than two midnights due to medical therapy.     Chief Complaint: Hypoxia      History Of Present Illness:      70 y.o. male who presented to [facility] with concern for abdominal pain, diarrhea and low back pain.  He was found to be hypoxic 88% on room air in triage.  Drug screen positive for benzodiazepine, cocaine metabolites, fentanyl and methadone. Chest x-ray was negative for any acute findings.  CT angiogram pulmonary was negative for pulmonary embolism or acute pulmonary findings.  CT abdomen pelvis with fluid-filled large bowels recommend to correlate with gastroenteritis.  EKG sinus bradycardia heart rate of 57.  Troponin of 19 and 15 proBNP of 89.  Initial blood pressure presentation was 95/58.  He did receive a liter of normal saline bolus and blood pressure did improve to 114/68.  Given hypoxia there is concern for COPD exacerbation IV Solu-Medrol pretreatment was ordered.  Patient mentions lost maybe 20 pounds over the last 3 to 6 months as he has not had much of an appetite compared to previous.  Patient is currently admitted for acute COPD exacerbation and viral gastroenteritis.      Past Medical History:          Diagnosis Date    Alcoholic (HCC)     CHF (congestive heart failure) (HCC)     Depression     Depression     Hep B w/ coma, chronic, w/ delta (HCC)     Hep C w/ coma, chronic     Heroin abuse (HCC)     Heroin abuse (HCC)     Sleep apnea, obstructive     Suicide attempt (HCC)     Suicide attempt (HCC)        Past Surgical History:          Procedure Laterality Date    BRONCHOSCOPY  11/30/2015    With BAL and Biopsies    THORACOSCOPY Right 12/04/15    with lung biopsy       Medications Prior to Admission:      Prior to Admission medications    Medication Sig Start  without any focal sensory/motor deficits. Cranial nerves: II-XII intact, grossly non-focal.  Psychiatric:  Alert and oriented, thought content appropriate, normal insight    CXR:  I have reviewed the CXR with the following interpretation: No acute process  EKG:  I have reviewed the EKG with the following interpretation: Sinus bradycardia    Labs:     Recent Labs     07/28/25 1947 07/29/25  1730   WBC 9.6 9.0   HGB 13.2 13.3   HCT 40.1 40.8    249     Recent Labs     07/28/25 1947 07/28/25  2318 07/29/25  1730    140 141   K 4.4 3.8 4.2   CL 98 102 106   CO2 24 24 22   BUN 29* 33* 28*   CREATININE 2.1* 1.8* 1.0   CALCIUM 9.1 8.9 9.1     Recent Labs     07/28/25 1947 07/29/25  1730   AST 23 22   ALT 12 11   BILITOT 0.2 0.2   ALKPHOS 80 87     No results for input(s): \"INR\" in the last 72 hours.  No results for input(s): \"CKTOTAL\", \"TROPONINI\" in the last 72 hours.    Urinalysis:      Lab Results   Component Value Date/Time    NITRU NEGATIVE 07/28/2025 08:19 PM    WBCUA 0 TO 5 07/28/2025 08:19 PM    WBCUA 0-1 06/28/2011 06:30 AM    BACTERIA 1+ 07/28/2025 08:19 PM    RBCUA 0 TO 2 07/28/2025 08:19 PM    RBCUA 1-3 06/28/2011 06:30 AM    BLOODU TRACE-INTACT 07/21/2022 07:45 PM    GLUCOSEU NEGATIVE 07/28/2025 08:19 PM    GLUCOSEU NEGATIVE 06/28/2011 06:30 AM         ASSESSMENT:  Acute hypoxic respiratory failure  Acute COPD exacerbation  Viral gastroenteritis  Hypotension-resolved  History of hypertension  History of hepatitis C  History of depression suicidal attempt  Chronic CHF unspecified  Alcohol dependence  Substance abuse  Unintentional weight loss      PLAN:  IV hydration.  IV Solu-Medrol brain treatments.  Pulmonology consult.  Continue oxygen support wean as tolerated.  Blood pressure on subsequent presentation 95/58.  Patient does not seem volume overloaded.  Echocardiogram ordered and pending.  DVT prophylaxis.  Oncology consult.           Yaya Mora MD    Thank you No primary care

## 2025-07-30 NOTE — PROGRESS NOTES
Tan messaged Dr. Kong regarding possible need for palliative consult.   Electronically signed by Yareli Harris RN on 7/30/2025 at 5:56 PM

## 2025-07-30 NOTE — CONSULTS
Harpreet Reyes M.D.,[facility]  Sandeep Johnson D.O., BABITA., [facility]  Radhika Nolan M.D.  Marietta Cline M.D.   Rony Francis D.O.  Tony Prince M.D.       Patient:  Krish De Paz 70 y.o. male MRN: [id number]           PULMONARY CONSULTATION    Reason for Consultation: COPD, hypoxia  Referring Physician:Dr Yaya Mora MD    Communication with the referring physician will be sent via the electronic medical record.    Chief Complaint: shortness of breath     CODE STATUS: FULL     SUBJECTIVE:  HPI:  Krish De Paz is a 70 y.o. male who we are asked to evaluate for COPD and hypoxia.    He has a past medical hx significant for alcohol abuse, SONNY, does not use PAP nicotine dependence cigarettes and vaping, seasonal allergies, CHF preserved EF, grade 1 diastolic dysfunction, substance abuse with opiates, heroin, suicide attempt, and hepatitis C and B, protein calorie malnutrition, who presents to the emergency department complaining of right hip and back pain radiating down to his legs.  History of bronchoscopy with BAL from November 2015 for severe diffuse multilobar pneumonia requiring prolonged hospital stay requiring ICU stay and mechanical ventilation.  Path from bronchoscopy with chronic inflammatory changes negative for malignancy, cytology with atypical cells suspicious for adenocarcinoma but not diagnostic of malignancy, pneumocystis negative.  Patient underwent right VATS open lung biopsy right upper lung middle and lower lobe on 12/4/2015.  Results showing organizing pneumonia with diffuse alveolar damage and superimposed emphysema.  He was treated with prolonged IV steroids and antibiotics.  He has not followed up in office since then.  He was last seen during prior hospital stay by our pulmonary service in 2017, over 8 years ago.  After the prior prolonged hospital stay he states that he quit smoking for 7 years and then restarted again.    Multiple ED visits since April 2025, 6 in total ED  requiring mechanical ventilation.  Bronchoscopy with BAL 11/30/2015 12/4/2015 CT surgery-underwent right VATS and open lung biopsy revealing organizing pneumonia and DAD  Depression with history of suicide attempt  Heart failure with preserved EF 60-65%.  Grade 1 diastolic dysfunction.  Mild RV dysfunction.  Mild pulmonary hypertension WHO group 2, 3    Plan:  Oxygen therapy 4 L nasal cannula wean to keep greater than 92%.  Baseline room air.  Will need O2 testing closer to discharge.  PAP if agreeable to use nocturnally for SONNY  CT chest reviewed chronic changes no evidence of pneumonia.  CT abdomen possible viral gastroenteritis  Scheduled bronchodilators/Brovana, budesonide, DuoNebs.  Home regimen DuoNebs as needed.  Recommend triple therapy inhaler for discharge Trelegy or Breztri  Add flutter valve  Guaifenesin 3 times daily  Check Pro-Zhao, strep pneumo and Legionella urine antigen, respiratory viral panel pending.  Stool for C. difficile pending  Monitor off antibiotics  Solu-Medrol 40 mg IV every 8 hours for 48 hours then transition to prednisone 40 mg by mouth daily for 3 doses.  Check alpha 1 antitrypsin level with phenotype  Elevated eosinophil 510  Episode of mild hypotension on arrival to ED 95/58 improved with 1 L fluid bolus.  IV fluids, gentle hydration per primary team.  Supportive care for viral gastroenteritis.  UDS positive, polysubstance abuse  Tobacco cessation counseling  DVT, GI prophylaxis-Lovenox, Protonix  Consult to oncology for unintentional weight loss per primary  Consider PFTs for COPD staging as outpatient when recovered.  [facility]  [facility].  Patient states he recently lost possession of his home.  Consult to social work to assist with DC planning  Thank you for allowing me to participate in the care of Krish De Paz.   Please feel free to call with questions.     This plan of care was reviewed

## 2025-07-30 NOTE — PROGRESS NOTES
Palliative care consult called to Cherelle Qiu NP     Electronically signed by Zahra Smiley RN on 7/30/2025 at 6:04 PM
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
## 2025-07-30 NOTE — PROGRESS NOTES
Chart accessed for [facility] admission  Electronically signed by Yareli Harris RN on 7/30/2025 at 3:03 PM

## 2025-07-30 NOTE — PROGRESS NOTES
4 Eyes Skin Assessment     NAME:  Krish De Paz  YOB: 1955  MEDICAL RECORD NUMBER:  [id number]    The patient is being assessed for  Admission    I agree that at least one RN has performed a thorough Head to Toe Skin Assessment on the patient. ALL assessment sites listed below have been assessed.      Areas assessed by both nurses:    Head, Face, Ears, Shoulders, Back, Chest, Arms, Elbows, Hands, Sacrum. Buttock, Coccyx, Ischium, Legs. Feet and Heels, and Under Medical Devices         Does the Patient have a Wound? No noted wound(s)       Zion Prevention initiated by RN: No  Wound Care Orders initiated by RN: No    For hospital-acquired stage 1 & 2 and ALL Stage 3,4, Unstageable, DTI, NWPT, and Complex wounds: place order “IP Wound Care/Ostomy Nurse Eval and Treat” by RN under : NA    New Ostomies, if present place, Ostomy referral order under : No     Nurse 1 eSignature: Electronically signed by Yareli Harris RN on 7/30/25 at 5:42 PM EDT    **SHARE this note so that the co-signing nurse can place an eSignature**    Nurse 2 eSignature: Electronically signed by Zahra Smiley RN on 7/31/25 at 10:02 AM EDT\

## 2025-07-30 NOTE — ED NOTES
Patient has been on nasal cannula while being in ER. Dr Tolentino notified of patient desaturating to 80% when his oxygen is turned off.

## 2025-07-31 LAB
ANION GAP SERPL CALCULATED.3IONS-SCNC: 11 MMOL/L (ref 7–16)
BASOPHILS # BLD: 0.02 K/UL (ref 0–0.2)
BASOPHILS NFR BLD: 0 % (ref 0–2)
BUN SERPL-MCNC: 16 MG/DL (ref 8–23)
C DIFF GDH + TOXINS A+B STL QL IA.RAPID: NEGATIVE
CALCIUM SERPL-MCNC: 9 MG/DL (ref 8.8–10.2)
CHLORIDE SERPL-SCNC: 107 MMOL/L (ref 98–107)
CO2 SERPL-SCNC: 21 MMOL/L (ref 22–29)
CREAT SERPL-MCNC: 0.7 MG/DL (ref 0.7–1.2)
CRP SERPL HS-MCNC: 3.3 MG/L (ref 0–5)
EKG ATRIAL RATE: 68 BPM
EKG P AXIS: 20 DEGREES
EKG P-R INTERVAL: 114 MS
EKG Q-T INTERVAL: 438 MS
EKG QRS DURATION: 90 MS
EKG QTC CALCULATION (BAZETT): 465 MS
EKG R AXIS: 48 DEGREES
EKG T AXIS: 58 DEGREES
EKG VENTRICULAR RATE: 68 BPM
EOSINOPHIL # BLD: 0 K/UL (ref 0.05–0.5)
EOSINOPHILS RELATIVE PERCENT: 0 % (ref 0–6)
ERYTHROCYTE [DISTWIDTH] IN BLOOD BY AUTOMATED COUNT: 15.2 % (ref 11.5–15)
ERYTHROCYTE [SEDIMENTATION RATE] IN BLOOD BY WESTERGREN METHOD: 30 MM/HR (ref 0–15)
GFR, ESTIMATED: >90 ML/MIN/1.73M2
GLUCOSE SERPL-MCNC: 124 MG/DL (ref 74–99)
HCT VFR BLD AUTO: 34 % (ref 37–54)
HGB BLD-MCNC: 11 G/DL (ref 12.5–16.5)
IMM GRANULOCYTES # BLD AUTO: 0.08 K/UL (ref 0–0.58)
IMM GRANULOCYTES NFR BLD: 1 % (ref 0–5)
L PNEUMO1 AG UR QL IA.RAPID: NEGATIVE
LYMPHOCYTES NFR BLD: 0.66 K/UL (ref 1.5–4)
LYMPHOCYTES RELATIVE PERCENT: 5 % (ref 20–42)
MCH RBC QN AUTO: 31.4 PG (ref 26–35)
MCHC RBC AUTO-ENTMCNC: 32.4 G/DL (ref 32–34.5)
MCV RBC AUTO: 97.1 FL (ref 80–99.9)
MONOCYTES NFR BLD: 0.51 K/UL (ref 0.1–0.95)
MONOCYTES NFR BLD: 4 % (ref 2–12)
NEUTROPHILS NFR BLD: 91 % (ref 43–80)
NEUTS SEG NFR BLD: 12.07 K/UL (ref 1.8–7.3)
PLATELET # BLD AUTO: 205 K/UL (ref 130–450)
PMV BLD AUTO: 9.5 FL (ref 7–12)
POTASSIUM SERPL-SCNC: 4.3 MMOL/L (ref 3.5–5.1)
PROCALCITONIN SERPL-MCNC: 1.58 NG/ML (ref 0–0.08)
RBC # BLD AUTO: 3.5 M/UL (ref 3.8–5.8)
S PNEUM AG SPEC QL: NEGATIVE
SODIUM SERPL-SCNC: 138 MMOL/L (ref 136–145)
SPECIMEN DESCRIPTION: NORMAL
SPECIMEN SOURCE: 0
WBC OTHER # BLD: 13.3 K/UL (ref 4.5–11.5)

## 2025-07-31 PROCEDURE — 93010 ELECTROCARDIOGRAM REPORT: CPT | Performed by: INTERNAL MEDICINE

## 2025-07-31 PROCEDURE — 85025 COMPLETE CBC W/AUTO DIFF WBC: CPT

## 2025-07-31 PROCEDURE — 80048 BASIC METABOLIC PNL TOTAL CA: CPT

## 2025-07-31 PROCEDURE — 84145 PROCALCITONIN (PCT): CPT

## 2025-07-31 PROCEDURE — 6370000000 HC RX 637 (ALT 250 FOR IP): Performed by: NURSE PRACTITIONER

## 2025-07-31 PROCEDURE — 85652 RBC SED RATE AUTOMATED: CPT

## 2025-07-31 PROCEDURE — 1200000000 HC SEMI PRIVATE

## 2025-07-31 PROCEDURE — 2500000003 HC RX 250 WO HCPCS: Performed by: FAMILY MEDICINE

## 2025-07-31 PROCEDURE — 6370000000 HC RX 637 (ALT 250 FOR IP): Performed by: FAMILY MEDICINE

## 2025-07-31 PROCEDURE — 97161 PT EVAL LOW COMPLEX 20 MIN: CPT

## 2025-07-31 PROCEDURE — 87040 BLOOD CULTURE FOR BACTERIA: CPT

## 2025-07-31 PROCEDURE — 94640 AIRWAY INHALATION TREATMENT: CPT

## 2025-07-31 PROCEDURE — 99222 1ST HOSP IP/OBS MODERATE 55: CPT | Performed by: NURSE PRACTITIONER

## 2025-07-31 PROCEDURE — 97165 OT EVAL LOW COMPLEX 30 MIN: CPT

## 2025-07-31 PROCEDURE — 36415 COLL VENOUS BLD VENIPUNCTURE: CPT

## 2025-07-31 PROCEDURE — 6360000002 HC RX W HCPCS: Performed by: STUDENT IN AN ORGANIZED HEALTH CARE EDUCATION/TRAINING PROGRAM

## 2025-07-31 PROCEDURE — 86140 C-REACTIVE PROTEIN: CPT

## 2025-07-31 PROCEDURE — 6370000000 HC RX 637 (ALT 250 FOR IP): Performed by: STUDENT IN AN ORGANIZED HEALTH CARE EDUCATION/TRAINING PROGRAM

## 2025-07-31 PROCEDURE — 6360000002 HC RX W HCPCS: Performed by: FAMILY MEDICINE

## 2025-07-31 RX ORDER — GUAIFENESIN 400 MG/1
400 TABLET ORAL 3 TIMES DAILY
Qty: 56 TABLET | Refills: 0 | Status: SHIPPED | OUTPATIENT
Start: 2025-07-31 | End: 2025-08-01

## 2025-07-31 RX ORDER — IPRATROPIUM BROMIDE AND ALBUTEROL SULFATE 2.5; .5 MG/3ML; MG/3ML
3 SOLUTION RESPIRATORY (INHALATION) EVERY 4 HOURS PRN
Qty: 360 ML | Refills: 3 | Status: SHIPPED | OUTPATIENT
Start: 2025-07-31 | End: 2025-08-01

## 2025-07-31 RX ORDER — NICOTINE 21 MG/24HR
1 PATCH, TRANSDERMAL 24 HOURS TRANSDERMAL DAILY
Qty: 30 PATCH | Refills: 3 | Status: SHIPPED | OUTPATIENT
Start: 2025-07-31 | End: 2025-08-01

## 2025-07-31 RX ORDER — PREDNISONE 20 MG/1
40 TABLET ORAL DAILY
Qty: 6 TABLET | Refills: 0 | Status: SHIPPED | OUTPATIENT
Start: 2025-08-01 | End: 2025-08-01

## 2025-07-31 RX ORDER — CETIRIZINE HYDROCHLORIDE 10 MG/1
10 TABLET ORAL DAILY
Qty: 30 TABLET | Refills: 0 | Status: SHIPPED | OUTPATIENT
Start: 2025-07-31 | End: 2025-08-01

## 2025-07-31 RX ADMIN — IPRATROPIUM BROMIDE AND ALBUTEROL SULFATE 1 DOSE: .5; 3 SOLUTION RESPIRATORY (INHALATION) at 12:28

## 2025-07-31 RX ADMIN — GUAIFENESIN 400 MG: 400 TABLET ORAL at 08:46

## 2025-07-31 RX ADMIN — SUCRALFATE 1 G: 1 TABLET ORAL at 20:19

## 2025-07-31 RX ADMIN — IPRATROPIUM BROMIDE AND ALBUTEROL SULFATE 1 DOSE: .5; 3 SOLUTION RESPIRATORY (INHALATION) at 18:16

## 2025-07-31 RX ADMIN — ASPIRIN 81 MG: 81 TABLET, CHEWABLE ORAL at 08:49

## 2025-07-31 RX ADMIN — GUAIFENESIN 400 MG: 400 TABLET ORAL at 20:19

## 2025-07-31 RX ADMIN — BUDESONIDE 500 MCG: 0.5 SUSPENSION RESPIRATORY (INHALATION) at 08:14

## 2025-07-31 RX ADMIN — WATER 40 MG: 1 INJECTION INTRAMUSCULAR; INTRAVENOUS; SUBCUTANEOUS at 08:47

## 2025-07-31 RX ADMIN — BUDESONIDE 500 MCG: 0.5 SUSPENSION RESPIRATORY (INHALATION) at 18:16

## 2025-07-31 RX ADMIN — SODIUM CHLORIDE, PRESERVATIVE FREE 10 ML: 5 INJECTION INTRAVENOUS at 09:02

## 2025-07-31 RX ADMIN — ACETAMINOPHEN 650 MG: 325 TABLET ORAL at 16:01

## 2025-07-31 RX ADMIN — CETIRIZINE HYDROCHLORIDE 10 MG: 10 TABLET, FILM COATED ORAL at 08:48

## 2025-07-31 RX ADMIN — ARFORMOTEROL TARTRATE 15 MCG: 15 SOLUTION RESPIRATORY (INHALATION) at 18:16

## 2025-07-31 RX ADMIN — GUAIFENESIN 400 MG: 400 TABLET ORAL at 16:01

## 2025-07-31 RX ADMIN — WATER 40 MG: 1 INJECTION INTRAMUSCULAR; INTRAVENOUS; SUBCUTANEOUS at 16:01

## 2025-07-31 RX ADMIN — Medication 135 MG: at 08:45

## 2025-07-31 RX ADMIN — SUCRALFATE 1 G: 1 TABLET ORAL at 08:49

## 2025-07-31 RX ADMIN — ARFORMOTEROL TARTRATE 15 MCG: 15 SOLUTION RESPIRATORY (INHALATION) at 08:14

## 2025-07-31 RX ADMIN — ENOXAPARIN SODIUM 40 MG: 100 INJECTION SUBCUTANEOUS at 08:49

## 2025-07-31 RX ADMIN — IPRATROPIUM BROMIDE AND ALBUTEROL SULFATE 1 DOSE: .5; 3 SOLUTION RESPIRATORY (INHALATION) at 08:13

## 2025-07-31 RX ADMIN — IPRATROPIUM BROMIDE AND ALBUTEROL SULFATE 1 DOSE: .5; 3 SOLUTION RESPIRATORY (INHALATION) at 00:45

## 2025-07-31 RX ADMIN — SODIUM CHLORIDE, PRESERVATIVE FREE 10 ML: 5 INJECTION INTRAVENOUS at 20:19

## 2025-07-31 RX ADMIN — SUCRALFATE 1 G: 1 TABLET ORAL at 16:01

## 2025-07-31 RX ADMIN — WATER 40 MG: 1 INJECTION INTRAMUSCULAR; INTRAVENOUS; SUBCUTANEOUS at 00:19

## 2025-07-31 RX ADMIN — PANTOPRAZOLE SODIUM 40 MG: 40 TABLET, DELAYED RELEASE ORAL at 05:29

## 2025-07-31 ASSESSMENT — PAIN DESCRIPTION - LOCATION
LOCATION: GENERALIZED;HEAD
LOCATION: GENERALIZED
LOCATION: GENERALIZED;ABDOMEN

## 2025-07-31 ASSESSMENT — PAIN DESCRIPTION - DESCRIPTORS
DESCRIPTORS: ACHING;DISCOMFORT;SORE
DESCRIPTORS: ACHING;DISCOMFORT;SORE
DESCRIPTORS: ACHING;DISCOMFORT

## 2025-07-31 ASSESSMENT — PAIN SCALES - GENERAL
PAINLEVEL_OUTOF10: 4
PAINLEVEL_OUTOF10: 7
PAINLEVEL_OUTOF10: 0
PAINLEVEL_OUTOF10: 6
PAINLEVEL_OUTOF10: 0

## 2025-07-31 ASSESSMENT — PAIN SCALES - WONG BAKER
WONGBAKER_NUMERICALRESPONSE: NO HURT

## 2025-07-31 ASSESSMENT — PAIN DESCRIPTION - ORIENTATION: ORIENTATION: UPPER;MID

## 2025-07-31 NOTE — CONSULTS
Palliative Care Department  [phone]  Palliative Care Initial Consult  Provider Dana Irwin, APRN - CNP    Krish De Paz  [id number]  Hospital Day: 3  /Date of Initial Consult: 7/30/2025  Referring Provider: Ana Maria Kong MD  Palliative Medicine was consulted for assistance with: Goals of care, advanced directives     HPI:   Krish De Paz is a 70 y.o. with a medical history of CHF, COPD, Hx drug abuse who was admitted on 7/29/2025 from home with a CHIEF COMPLAINT of abdominal pain, diarrhea and low back pain.  In ED patient was found to be hypoxic, 88% on room air.  Drug screen positive for benzodiazepine, cocaine, fentanyl and methadone.  CXR negative for any acute findings.  CT angiogram pulmonary was negative for luminary embolism or acute pulmonary findings.  CT abdomen pelvis with fluid-filled large bowels recommend to correlate with gastroenteritis.  EKG sinus bradycardia heart rate of 57.  Given hypoxia there was concern for COPD exacerbation.  Patient admitted for further medical management.  ASSESSMENT/PLAN:     Pertinent Hospital Diagnoses     Acute hypoxic respiratory failure  Acute COPD exacerbation  Viral gastroenteritis  CHF  Hx hepatitis C  Substance abuse    Palliative Care Encounter / Counseling Regarding Goals of Care  Please see detailed goals of care discussion as below  At this time, Krish De Paz, Does have capacity for medical decision-making.  Capacity is time limited and situation/question specific  During encounter there was no surrogate medical decision-maker needed  Outcome of goals of care meeting:   Confirmed full code status  Code status Full Code  Advanced Directives: no POA or living will in epic  Surrogate/Legal NOK:  Francisca Sanchez [phone]      Spiritual assessment: no spiritual distress identified  Bereavement and grief: to be determined  Referrals to: none today  SUBJECTIVE:     Current medical issues leading to Palliative Medicine involvement include

## 2025-07-31 NOTE — PROGRESS NOTES
Harpreet Reyes M.D.,[facility]  Sandeep Johnson D.O., F.MIRI.LALITO.OMAVIS., [facility]  Karmen Nolan M.D.  Marietta Cline M.D.   Rony Francis D.O.  Tony Prince M.D.         Daily Pulmonary Progress Note    Patient:  Krish De Paz 70 y.o. male MRN: [id number]            Synopsis     We are following patient for acute hypoxic respiratory failure, COPD exacerbation    \"CC\" shortness of breath    Code status: Full      Subjective      7/31/2025 patient was seen and examined.  Oxygen 2 L nasal cannula wean to keep greater than 92%. Occasional cough. Elevated WBCs, (on corticosteroids). Elevated procalcitonin 3.29. Bacterial antigens negative, resp viral panel negative. Now states he quit smoking a long time ago. Refusing nicoderm patch. Appears agitated .       Review of Systems:  Constitutional:  weight loss, fatigue  Skin: Denies pigmentation, dark lesions, and rashes   HEENT: Denies hearing loss, tinnitus, ear drainage, epistaxis, sore throat, and hoarseness.  Cardiovascular: Denies palpitations, chest pain, and chest pressure.  Respiratory: Shortness of breath, hypoxia, requiring O2 4 L nasal cannula, right side chest discomfort  Gastrointestinal: Abdominal pain, diarrhea, decreased appetite and 20 pound weight loss over the past 3 to 6 months  Genitourinary: Denies dysuria, frequency, urgency or hematuria  Musculoskeletal: Denies myalgias, muscle weakness, and bone pain back pain, radiculopathy  Neurological: Denies dizziness, vertigo, headache, and focal weakness prior suicide attempt, polysubstance abuse  Psychological: Denies anxiety and depression  Endocrine: Denies heat intolerance and cold intolerance  Hematopoietic/Lymphatic: Denies bleeding problems and blood transfusions hepatitis B and C    24-hour events:  None     Objective   OBJECTIVE:   BP (!) 141/68   Pulse 83   Temp 97.6 °F (36.4 °C) (Temporal)   Resp 18   Ht 1.829 m (6')   Wt 70.8 kg (156 lb)   SpO2 95%   BMI 21.16 kg/m²   SpO2 Readings from

## 2025-07-31 NOTE — CARE COORDINATION
Peer Recovery Support Note       Name:  Krish De Paz   Date:    7/31/2025        Chief Complaint   Patient presents with    Abdominal Pain    Diarrhea    Extremity Weakness     Right leg weakness. Diarrhea and abdominal pain. From [facility].            Peer Support met with patient.  [x] Support and education provided  [x] Resources provided   [x] Treatment referral: [facility]  [] Other:   [] Patient declined peer recovery services      Referred By: Jayshree ( RN)     Notes:  Peer met with patient. Patient was eager and willing to meet with peer recovery services. Patient shared reasoning for hospitalization. Patient is willing to go back to [facility] once he is medically cleared. Peer shared lived experience and offered hope. Peer has followed up with CM. Peer shared contact information for future references.

## 2025-07-31 NOTE — CARE COORDINATION
7/31/25 CM Note Pt admitted 7/30/25 acute respiratory failure with hypoxia. Pt on room air, SpO2 93%.  Pt currently homeless, [facility] Independent with ADLs. DME uses PR[employer] includes cane, walker or crutch.  Drug screen + on admission. DC plan for IP Rehab upon DC.  Pt known to [facility] on [address] and is agreeable to return. Spoke with Lucy ([phone])from admissions, requested information faxed.  [facility] staff to call for nurse to nurse and set up transport once information reviewed.   Jessica PRATT RN-BC  [phone] accepted pt then declined after nurse to nurse. Pt stating cannot walk with out crutch and they will not accept pt on crutches. PT/OT eval ordered       · Continue Catapres HS  · Blood pressure controlled at time of admission  · Monitor per unit routine

## 2025-07-31 NOTE — DISCHARGE SUMMARY
Hospitalist Discharge Summary    Patient ID: Krish De Paz   Patient : 1955  Patient's PCP: No primary care provider on file.    Admit Date: 2025   Admitting Physician: Yaya Mora MD    Discharge Date:  2025   Discharge Physician: Ana Maria Kong MD   Discharge Condition: Stable  Discharge Disposition: Skilled Facility      Hospital course in brief:  (Please refer to daily progress notes for a comprehensive review of the hospitalization by requesting medical records)    70 y.o. male who presented to [facility] with concern for abdominal pain, diarrhea and low back pain.  He was found to be hypoxic 88% on room air in triage.  Drug screen positive for benzodiazepine, cocaine metabolites, fentanyl and methadone. Chest x-ray was negative for any acute findings.  CT angiogram pulmonary was negative for pulmonary embolism or acute pulmonary findings.  CT abdomen pelvis with fluid-filled large bowels recommend to correlate with gastroenteritis.  EKG sinus bradycardia heart rate of 57.  Troponin of 19 and 15 proBNP of 89.  Initial blood pressure presentation was 95/58.  He did receive a liter of normal saline bolus and blood pressure did improve to 114/68.  Given hypoxia there is concern for COPD exacerbation IV Solu-Medrol pretreatment was ordered.  Patient mentioned lost maybe 20 pounds over the last 3 to 6 months as he has not had much of an appetite compared to previous. Oncology consulted  Patient was admitted for acute COPD exacerbation ( pulmonary was consulted) and viral gastroenteritis.       In hospital, patient got iv steroid and breathing treatment; o2 requirement improved and was on room air before discharge  Patient's diarrhea was initially watery but later was soft and less frequent; c diff and stool cx negative; likely related to recent drug abuse; patient is pain medication seeking and is very demanding; seeking more methadone; palliative care  Date: 7/5/2025  EXAMINATION: ONE XRAY VIEW OF THE PELVIS AND TWO XRAY VIEWS RIGHT HIP 7/5/2025 3:37 pm COMPARISON: None. HISTORY: ORDERING SYSTEM PROVIDED HISTORY: right hip pain TECHNOLOGIST PROVIDED HISTORY: Reason for exam:->right hip pain FINDINGS: Pelvic ring is intact although there are metallic densities suspected within the pockets.  No hip joint space narrowing is noted.  Particular attention was directed to the right hip.  Subtle bilateral CAM deformities of femoroacetabular impingement are suggested.     1. No acute fracture or dislocation. 2. Subtle bilateral CAM deformities of femoroacetabular impingement are suggested.       Discharge Medications:      Medication List        START taking these medications      cetirizine 10 MG tablet  Commonly known as: ZYRTEC  Take 1 tablet by mouth daily     fluticasone 50 MCG/ACT nasal spray  Commonly known as: FLONASE  1 spray by Each Nostril route daily     guaiFENesin 400 MG tablet  Take 1 tablet by mouth in the morning, at noon, and at bedtime     lidocaine 4 % external patch  Place 1 patch onto the skin in the morning.     nicotine 21 MG/24HR  Commonly known as: NICODERM CQ  Place 1 patch onto the skin daily     predniSONE 20 MG tablet  Commonly known as: DELTASONE  Take 2 tablets by mouth daily for 3 days  Start taking on: August 1, 2025     Trelegy Ellipta 100-62.5-25 MCG/ACT Aepb inhaler  Generic drug: fluticasone-umeclidin-vilant  Inhale 1 puff into the lungs daily            CHANGE how you take these medications      ipratropium 0.5 mg-albuterol 2.5 mg 0.5-2.5 (3) MG/3ML Soln nebulizer solution  Commonly known as: DUONEB  Inhale 3 mLs into the lungs every 4 hours as needed for Shortness of Breath or Wheezing  What changed:   when to take this  reasons to take this     methadone 10 MG/ML solution  Commonly known as: DOLOPHINE  What changed: Another medication with the same name was removed. Continue taking this medication, and follow the directions you see

## 2025-07-31 NOTE — CONSULTS
[facility]  Hematology/Oncology  Consult      Patient Name: Krish De Paz  YOB: 1955  PCP: No primary care provider on file.   Referring Provider:      Reason for Consultation:   Chief Complaint   Patient presents with    Abdominal Pain    Diarrhea    Extremity Weakness     Right leg weakness. Diarrhea and abdominal pain. From [facility].         History of Present Illness:   This is a 70-year-old male patient with a PMH of alcoholism, CHF, depression, hep B, heroin abuse, sleep apnea, who presented to the ED for evaluation of abdominal pain, diarrhea,  lower back pain and unintentional weight loss of about 20 pounds over the last 3 to 6 months as he has not had much of an appetite. At that time he was found to be hypoxic on room air with a positive drug screen for multiple drugs. CXR was negative.  CTA pulmonary was negative for PE and acute pulmonary findings.  CT A/P showed fluid-filled large bowels concern for gastroenteritis.  Pulmonology was consulted and reviewed CTA pulmonary showing advanced emphysematous changes. Treatment for acute COPD exacerbation was started. Cultures were drawn including stool cultures and Cdiff was negative. CMP with procal 3.29. CRP 3.3. Troponin 15. ProBNP 89. LFT's WNL. CBC with WBC 13.3, ANC 12.07, Hgb 11.0, platelets 205. Consultation for unintentional weight loss      Diagnostic Data:     Past Medical History:   Diagnosis Date    Alcoholic (HCC)     CHF (congestive heart failure) ([facility])     Depression     Depression     Hep B w/ coma, chronic, w/ delta ([facility])     Hep C w/ coma, chronic     Heroin abuse ([facility])     Heroin abuse ([facility])     Sleep apnea, obstructive     Suicide attempt ([facility])     Suicide attempt ([facility])        Patient Active Problem List    Diagnosis Date Noted    Closed traumatic minimally displaced fracture of multiple ribs, initial encounter 07/22/2022    Acute respiratory failure with hypoxia ([facility]) 07/30/2025    Unintentional weight

## 2025-07-31 NOTE — PROGRESS NOTES
Physical Therapy  Physical Therapy Initial Assessment     Name: Krish De Paz  : 1955  MRN: [id number]      Date of Service: 2025    Evaluating PT:  Delroy Lopes PT, DPT    Room #:  7407/7407-A  Diagnosis:  Shortness of breath [R06.02]  Hypoxemia [R09.02]  Substance abuse (HCC) [F19.10]  General weakness [R53.1]  Acute respiratory failure with hypoxia (HCC) [J96.01]  Acute on chronic low back pain [M54.50, G89.29]  PMHx:   Past Medical History:   Diagnosis Date    Alcoholic (HCC)     CHF (congestive heart failure) (HCC)     Depression     Depression     Hep B w/ coma, chronic, w/ delta (HCC)     Hep C w/ coma, chronic     Heroin abuse (HCC)     Heroin abuse (HCC)     Sleep apnea, obstructive     Suicide attempt (HCC)     Suicide attempt (HCC)      PSHx:   Past Surgical History:   Procedure Laterality Date    BRONCHOSCOPY  2015    With BAL and Biopsies    THORACOSCOPY Right 12/04/15    with lung biopsy     Procedure/Surgery:  N/A  Precautions:  fall risk, tangential, bed alarm  Equipment Needs:  TBD    SUBJECTIVE:    Per chart, pt is homeless. Pt ambulated with crutch or ww PTA.    OBJECTIVE:   Initial Evaluation  Date: 25 Treatment Short Term/ Long Term   Goals   AM-PAC 6 Clicks      Was pt agreeable to Eval/treatment? yes     Does pt have pain? Unrated R hip pain     Bed Mobility  Rolling: NT  Supine to sit: SBA  Sit to supine: NT  Scooting: SBA  Rolling: IND  Supine to sit: IND  Sit to supine: IND  Scooting: IND   Transfers Sit to stand: SBA  Stand to sit: SBA  Stand pivot: SBA with no AD  Sit to stand: IND  Stand to sit: IND  Stand pivot: mod I with AAD   Ambulation    150' with ww SBA  50' with limited to no AD use SBA  400' with AAD mod I   Stair negotiation: ascended and descended  NT       Strength/ROM:   BLE grossly 4+/5  BLE AROM WFL    Balance:   Static Sitting: Supervision  Dynamic Sitting: Supervison  Static Standing: SBA with no AD  Dynamic Standing: SBA with ww    Pt is A &

## 2025-07-31 NOTE — PROGRESS NOTES
OCCUPATIONAL THERAPY INITIAL EVALUATION    [facility] [address] [place], [place]       Date:2025                                                  Patient Name: Krish De Paz  MRN: [id number]  : 1955  Room: [address] [place] [address]    Evaluating OT: Jacob Cristobal OTR/L [id number]    Referring Provider: Ana Maria Kong MD      Specific Provider Orders/Date: OT evaluation and treatment 25 1675    Diagnosis:  Shortness of breath [R06.02]  Hypoxemia [R09.02]  Substance abuse (HCC) [F19.10]  General weakness [R53.1]  Acute respiratory failure with hypoxia (HCC) [J96.01]  Acute on chronic low back pain [M54.50, G89.29]      Pertinent Medical History:  has a past medical history of Alcoholic (HCC), CHF (congestive heart failure) (HCC), Depression, Depression, Hep B w/ coma, chronic, w/ delta (HCC), Hep C w/ coma, chronic, Heroin abuse (HCC), Heroin abuse (HCC), Sleep apnea, obstructive, Suicide attempt (HCC), and Suicide attempt (HCC).   Past Surgical History:   Procedure Laterality Date    BRONCHOSCOPY  2015    With BAL and Biopsies    THORACOSCOPY Right 12/04/15    with lung biopsy       Pt presented to the hospital on  with low BP     Orders received, chart reviewed, eval complete.     Precautions:  Fall Risk    Assessment of current deficits   [x] Functional mobility   [x]ADLs  [] Strength               []Cognition   [x] Functional transfers   [x] IADLs         [x] Safety Awareness   []Endurance   [] Fine Motor Coordination [x] Balance [] Vision/perception   []Sensation    [] Gross Motor Coordination [] ROM  [] Delirium                  [] Motor Control     OT PLAN OF CARE   OT POC based on physician orders, patient diagnosis and results of clinical assessment    Frequency/Duration 1-3 days/wk for 2 weeks PRN   Specific OT Treatment to include:   * Instruction/training on adapted ADL techniques and AE recommendations to increase

## 2025-07-31 NOTE — PROGRESS NOTES
Comprehensive Nutrition Assessment    Type and Reason for Visit:  Initial, Positive nutrition screen    Nutrition Recommendations/Plan:   Continue regular diet as tolerated  Continue Ensure High Protein Daily (LC/HP) ONS daily     Malnutrition Assessment:  Malnutrition Status:  At risk for malnutrition (07/31/25 1049)    Context:  Chronic Illness     Findings of the 6 clinical characteristics of malnutrition:  Energy Intake:  Mild decrease in energy intake (self reported poor PO intake during admission)  Weight Loss:  7.5% over 3 months (Comp weight hx endorses -12kg weight loss within ~3 months; -14% body weight significant for malnutrition)     Body Fat Loss:  Unable to assess     Muscle Mass Loss:  Unable to assess    Fluid Accumulation:  Unable to assess     Strength:  Not Performed    Nutrition Assessment:    Pt admitted for acute respiratory failure with hypoxia; RD screens for MST 3 (unsure of weight loss + decreased appetite). PMH etoh abuse, CHF, depression, hepatitis, heroin abuse, sleep apnea, suicide attempt. Ordered regular diet with ONS in place dials (LC/HP ONS - Ensure High Protein). Pt presented with abdominal pain, + diarrhea, and + nausea; + gastroenteritis. Oncology following for unintentional weight loss (pt reports -20lb weight loss over 3-6 months with poor appetite). No documented intakes during present admission; recommend to optimize nutritional intakes as above 2/2 to weight loss and appetite.    Nutrition Related Findings:    AAOx4; GCS 15; soft abdomen with active bowel sounds; + diarrhea; Labs: Glucose 100s, + tox screen; Meds: bronova, lovenox, prednisone, protonix Wound Type: None       Current Nutrition Intake & Therapies:    Average Meal Intake: Unable to assess  Average Supplements Intake: Unable to assess  ADULT DIET; Regular  ADULT ORAL NUTRITION SUPPLEMENT; Breakfast; Low Calorie/High Protein Oral Supplement    Anthropometric Measures:  Height: 182.9 cm (6' 0.01\")  Ideal

## 2025-07-31 NOTE — DISCHARGE INSTR - COC
Continuity of Care Form    Patient Name: Krish De Paz   :  1955  MRN:  [id number]    Admit date:  2025  Discharge date:  2025    Code Status Order: Full Code   Advance Directives:     Admitting Physician:  Yaya Mora MD  PCP: No primary care provider on file.    Discharging Nurse: Carmen Mauro RN  Discharging Hospital Unit/Room#: 7407/7407-A  Discharging Unit Phone Number: [phone]    Emergency Contact:   Extended Emergency Contact Information  Primary Emergency Contact: Francisca Sanchez  Work Phone: [phone]  Relation: Other    Past Surgical History:  Past Surgical History:   Procedure Laterality Date    BRONCHOSCOPY  2015    With BAL and Biopsies    THORACOSCOPY Right 12/04/15    with lung biopsy       Immunization History:   Immunization History   Administered Date(s) Administered    COVID-19, PFIZER PURPLE top, DILUTE for use, (age 12 y+), 30mcg/0.3mL 2021, 2021, 2022       Active Problems:  Patient Active Problem List   Diagnosis Code    Pneumonia due to organism J18.9    Hemoptysis R04.2    Respiratory failure with hypoxia ([facility]) J96.91    PAF (paroxysmal atrial fibrillation) ([facility]) I48.0    ILD (interstitial lung disease) ([facility]) J84.9    Essential hypertension I10    Polysubstance abuse ([facility]) F19.10    Acute on chronic respiratory failure with hypoxia ([facility]) J96.21    COPD (chronic obstructive pulmonary disease) ([facility]) J44.9    Intractable nausea and vomiting R11.2    Closed traumatic minimally displaced fracture of multiple ribs, initial encounter S22.49XA    Acute respiratory failure with hypoxia ([facility]) J96.01    Unintentional weight loss R63.4    COPD exacerbation ([facility]) J44.1    Acute hypoxic respiratory failure ([facility]) J96.01       Isolation/Infection:   Isolation            C Diff Contact          Patient Infection Status    None to display              Nurse Assessment:  Last Vital Signs: /83   Pulse 93   Temp 98.5 °F (36.9 °C) (Temporal)   Resp  15   Ht 1.829 m (6' 0.01\")   Wt 70.8 kg (156 lb)   SpO2 91%   BMI 21.15 kg/m²     Last documented pain score (0-10 scale): Pain Level: 7  Last Weight:   Wt Readings from Last 1 Encounters:   07/30/25 70.8 kg (156 lb)     Mental Status:  oriented, alert, coherent, logical, thought processes intact, and able to concentrate and follow conversation    IV Access:  - None    Nursing Mobility/ADLs:  Walking   Independent  Transfer  Independent  Bathing  Independent  Dressing  Independent  Toileting  Independent  Feeding  Independent  Med Admin  Independent  Med Delivery   none    Wound Care Documentation and Therapy:        Elimination:  Continence:   Bowel: No  Bladder: No  Urinary Catheter: None   Colostomy/Ileostomy/Ileal Conduit: No       Date of Last BM: 07/31/2025    Intake/Output Summary (Last 24 hours) at 7/31/2025 1445  Last data filed at 7/31/2025 1403  Gross per 24 hour   Intake 720 ml   Output --   Net 720 ml     No intake/output data recorded.    Safety Concerns:     None    Impairments/Disabilities:      None    Nutrition Therapy:  Current Nutrition Therapy:   - Oral Diet:  General    Routes of Feeding: Oral  Liquids: Thin Liquids  Daily Fluid Restriction: no  Last Modified Barium Swallow with Video (Video Swallowing Test): not done    Treatments at the Time of Hospital Discharge:   Respiratory Treatments: As ordered  Oxygen Therapy:  is not on home oxygen therapy.  Ventilator:    - No ventilator support    Rehab Therapies: ***  Weight Bearing Status/Restrictions: No weight bearing restrictions  Other Medical Equipment (for information only, NOT a DME order):  ***  Other Treatments: As ordered    Patient's personal belongings (please select all that are sent with patient):  None    RN SIGNATURE:  Electronically signed by SRINIVASA JARRETT RN on 7/31/25 at 2:47 PM EDT    CASE MANAGEMENT/SOCIAL WORK SECTION    Inpatient Status Date: ***    Readmission Risk Assessment Score:  [facility] RISK OF UNPLANNED READMISSION

## 2025-08-01 VITALS
DIASTOLIC BLOOD PRESSURE: 84 MMHG | WEIGHT: 156 LBS | HEART RATE: 70 BPM | SYSTOLIC BLOOD PRESSURE: 160 MMHG | TEMPERATURE: 97.6 F | HEIGHT: 72 IN | OXYGEN SATURATION: 93 % | BODY MASS INDEX: 21.13 KG/M2 | RESPIRATION RATE: 16 BRPM

## 2025-08-01 LAB
EKG ATRIAL RATE: 57 BPM
EKG P AXIS: 35 DEGREES
EKG P-R INTERVAL: 124 MS
EKG Q-T INTERVAL: 452 MS
EKG QRS DURATION: 98 MS
EKG QTC CALCULATION (BAZETT): 439 MS
EKG R AXIS: 57 DEGREES
EKG T AXIS: 62 DEGREES
EKG VENTRICULAR RATE: 57 BPM
MICROORGANISM SPEC CULT: NORMAL
MICROORGANISM SPEC CULT: NORMAL
SPECIMEN DESCRIPTION: NORMAL

## 2025-08-01 PROCEDURE — 6370000000 HC RX 637 (ALT 250 FOR IP): Performed by: FAMILY MEDICINE

## 2025-08-01 PROCEDURE — 6360000002 HC RX W HCPCS: Performed by: FAMILY MEDICINE

## 2025-08-01 PROCEDURE — 6370000000 HC RX 637 (ALT 250 FOR IP): Performed by: STUDENT IN AN ORGANIZED HEALTH CARE EDUCATION/TRAINING PROGRAM

## 2025-08-01 PROCEDURE — 99232 SBSQ HOSP IP/OBS MODERATE 35: CPT | Performed by: STUDENT IN AN ORGANIZED HEALTH CARE EDUCATION/TRAINING PROGRAM

## 2025-08-01 PROCEDURE — 6360000002 HC RX W HCPCS: Performed by: STUDENT IN AN ORGANIZED HEALTH CARE EDUCATION/TRAINING PROGRAM

## 2025-08-01 PROCEDURE — 2500000003 HC RX 250 WO HCPCS: Performed by: FAMILY MEDICINE

## 2025-08-01 PROCEDURE — 93010 ELECTROCARDIOGRAM REPORT: CPT | Performed by: INTERNAL MEDICINE

## 2025-08-01 PROCEDURE — 6370000000 HC RX 637 (ALT 250 FOR IP): Performed by: NURSE PRACTITIONER

## 2025-08-01 PROCEDURE — 94640 AIRWAY INHALATION TREATMENT: CPT

## 2025-08-01 RX ORDER — PREDNISONE 20 MG/1
40 TABLET ORAL DAILY
Qty: 6 TABLET | Refills: 0 | Status: SHIPPED | OUTPATIENT
Start: 2025-08-01 | End: 2025-08-04

## 2025-08-01 RX ORDER — LOPERAMIDE HYDROCHLORIDE 2 MG/1
2 CAPSULE ORAL DAILY PRN
Qty: 10 CAPSULE | Refills: 0 | Status: SHIPPED | OUTPATIENT
Start: 2025-08-01 | End: 2025-08-11

## 2025-08-01 RX ORDER — GUAIFENESIN 400 MG/1
400 TABLET ORAL 3 TIMES DAILY
Qty: 56 TABLET | Refills: 0 | Status: SHIPPED | OUTPATIENT
Start: 2025-08-01

## 2025-08-01 RX ORDER — IPRATROPIUM BROMIDE AND ALBUTEROL SULFATE 2.5; .5 MG/3ML; MG/3ML
3 SOLUTION RESPIRATORY (INHALATION) EVERY 4 HOURS PRN
Qty: 360 ML | Refills: 3 | Status: SHIPPED | OUTPATIENT
Start: 2025-08-01

## 2025-08-01 RX ORDER — CETIRIZINE HYDROCHLORIDE 10 MG/1
10 TABLET ORAL DAILY
Qty: 30 TABLET | Refills: 0 | Status: SHIPPED | OUTPATIENT
Start: 2025-08-01

## 2025-08-01 RX ORDER — NICOTINE 21 MG/24HR
1 PATCH, TRANSDERMAL 24 HOURS TRANSDERMAL DAILY
Qty: 30 PATCH | Refills: 3 | Status: SHIPPED | OUTPATIENT
Start: 2025-08-01

## 2025-08-01 RX ORDER — LOPERAMIDE HYDROCHLORIDE 2 MG/1
2 CAPSULE ORAL 3 TIMES DAILY PRN
Status: DISCONTINUED | OUTPATIENT
Start: 2025-08-01 | End: 2025-08-01 | Stop reason: HOSPADM

## 2025-08-01 RX ORDER — FLUTICASONE FUROATE, UMECLIDINIUM BROMIDE AND VILANTEROL TRIFENATATE 100; 62.5; 25 UG/1; UG/1; UG/1
1 POWDER RESPIRATORY (INHALATION) DAILY
Qty: 1 EACH | Refills: 3 | Status: SHIPPED | OUTPATIENT
Start: 2025-08-01

## 2025-08-01 RX ADMIN — SUCRALFATE 1 G: 1 TABLET ORAL at 12:44

## 2025-08-01 RX ADMIN — ASPIRIN 81 MG: 81 TABLET, CHEWABLE ORAL at 07:52

## 2025-08-01 RX ADMIN — CETIRIZINE HYDROCHLORIDE 10 MG: 10 TABLET, FILM COATED ORAL at 07:52

## 2025-08-01 RX ADMIN — PANTOPRAZOLE SODIUM 40 MG: 40 TABLET, DELAYED RELEASE ORAL at 05:27

## 2025-08-01 RX ADMIN — ARFORMOTEROL TARTRATE 15 MCG: 15 SOLUTION RESPIRATORY (INHALATION) at 10:08

## 2025-08-01 RX ADMIN — GUAIFENESIN 400 MG: 400 TABLET ORAL at 07:52

## 2025-08-01 RX ADMIN — BUDESONIDE 500 MCG: 0.5 SUSPENSION RESPIRATORY (INHALATION) at 10:08

## 2025-08-01 RX ADMIN — Medication 135 MG: at 07:53

## 2025-08-01 RX ADMIN — ENOXAPARIN SODIUM 40 MG: 100 INJECTION SUBCUTANEOUS at 07:53

## 2025-08-01 RX ADMIN — PREDNISONE 40 MG: 20 TABLET ORAL at 07:52

## 2025-08-01 RX ADMIN — IPRATROPIUM BROMIDE AND ALBUTEROL SULFATE 1 DOSE: .5; 3 SOLUTION RESPIRATORY (INHALATION) at 10:07

## 2025-08-01 RX ADMIN — WATER 40 MG: 1 INJECTION INTRAMUSCULAR; INTRAVENOUS; SUBCUTANEOUS at 00:11

## 2025-08-01 RX ADMIN — ACETAMINOPHEN 650 MG: 325 TABLET ORAL at 12:44

## 2025-08-01 RX ADMIN — SUCRALFATE 1 G: 1 TABLET ORAL at 07:52

## 2025-08-01 ASSESSMENT — PAIN DESCRIPTION - DESCRIPTORS: DESCRIPTORS: ACHING;DULL;DISCOMFORT

## 2025-08-01 ASSESSMENT — PAIN SCALES - GENERAL
PAINLEVEL_OUTOF10: 0
PAINLEVEL_OUTOF10: 5

## 2025-08-01 ASSESSMENT — PAIN - FUNCTIONAL ASSESSMENT: PAIN_FUNCTIONAL_ASSESSMENT: PREVENTS OR INTERFERES SOME ACTIVE ACTIVITIES AND ADLS

## 2025-08-01 ASSESSMENT — PAIN DESCRIPTION - LOCATION: LOCATION: GENERALIZED

## 2025-08-01 ASSESSMENT — PAIN DESCRIPTION - ORIENTATION: ORIENTATION: OTHER (COMMENT)

## 2025-08-01 NOTE — PROGRESS NOTES
[facility] Hospitalist Progress Note    SYNOPSIS: Patient admitted on 2025 for Acute respiratory failure with hypoxia (HCC)  70 y.o. male who presented to [facility] with concern for abdominal pain, diarrhea and low back pain.  He was found to be hypoxic 88% on room air in triage.  Drug screen positive for benzodiazepine, cocaine metabolites, fentanyl and methadone. Chest x-ray was negative for any acute findings.  CT angiogram pulmonary was negative for pulmonary embolism or acute pulmonary findings.  CT abdomen pelvis with fluid-filled large bowels recommend to correlate with gastroenteritis.  EKG sinus bradycardia heart rate of 57.  Troponin of 19 and 15 proBNP of 89.  Initial blood pressure presentation was 95/58.  He did receive a liter of normal saline bolus and blood pressure did improve to 114/68.  Given hypoxia there is concern for COPD exacerbation IV Solu-Medrol pretreatment was ordered.  Patient mentioned lost maybe 20 pounds over the last 3 to 6 months as he has not had much of an appetite compared to previous. Oncology consulted  Patient was admitted for acute COPD exacerbation ( pulmonary was consulted) and viral gastroenteritis.         In hospital, patient got iv steroid and breathing treatment; o2 requirement improved and was on room air before discharge  Patient's diarrhea was initially watery but later was soft and less frequent; c diff and stool cx negative; likely related to recent drug abuse; patient is pain medication seeking and is very demanding; seeking more methadone; palliative care consulted  Oncology evaluated the patient    SUBJECTIVE:  Stable overnight. No other overnight issues reported.   Patient seen and examined  Patient is coercing for methadone dose to be increased  Records reviewed.           Temp (24hrs), Av.4 °F (36.3 °C), Min:97 °F (36.1 °C), Max:98.1 °F (36.7 °C)    DIET: ADULT DIET; Regular  ADULT ORAL NUTRITION SUPPLEMENT; Breakfast; Low  Calorie/High Protein Oral Supplement  CODE: Full Code    Intake/Output Summary (Last 24 hours) at 8/1/2025 1311  Last data filed at 8/1/2025 0730  Gross per 24 hour   Intake 600 ml   Output 920 ml   Net -320 ml       Review of Systems  All bolded are positive; please see HPI  General:  Fever, chills, diaphoresis, fatigue, malaise, night sweats, weight loss  Psychological:  Anxiety, disorientation, hallucinations.  ENT:  Epistaxis, headaches, vertigo, visual changes.  Cardiovascular:  Chest pain, irregular heartbeats, palpitations, paroxysmal nocturnal dyspnea.  Respiratory:  Shortness of breath, coughing, sputum production, hemoptysis, wheezing, orthopnea.  Gastrointestinal:  Nausea, vomiting, diarrhea, heartburn, constipation, abdominal pain, hematemesis, hematochezia, melena, acholic stools  Genito-Urinary:  Dysuria, urgency, frequency, hematuria  Musculoskeletal:  Joint pain, joint stiffness, joint swelling, muscle pain  Neurology:  Headache, focal neurological deficits, weakness, numbness, paresthesia  Derm:  Rashes, ulcers, excoriations, bruising  Extremities:  Decreased ROM, peripheral edema, mottling      OBJECTIVE:    BP (!) 160/84   Pulse 70   Temp 97.6 °F (36.4 °C) (Temporal)   Resp 16   Ht 1.829 m (6' 0.01\")   Wt 70.8 kg (156 lb)   SpO2 93%   BMI 21.15 kg/m²     General appearance:  awake, alert, and oriented to person, place, time, and purpose; appears stated age and cooperative; no apparent distress no labored breathing  HEENT:  Conjunctivae/corneas clear.   Neck: Supple. No jugular venous distention.   Respiratory: symmetrical; clear to auscultation bilaterally; no wheezes; no rhonchi; no rales  Cardiovascular: rhythm regular; rate controlled; no murmurs  Abdomen: Soft, nontender, nondistended  Extremities:  peripheral pulses present; no peripheral edema; no ulcers  Musculoskeletal: No clubbing, cyanosis, no bilateral lower extremity edema. Brisk capillary refill.   Skin:  No rashes  on visible

## 2025-08-01 NOTE — CARE COORDINATION
8/1/2025social work transition of care planning  Discharge ordered noted. Franklin spoke with pt for transition of care planning. Pt was accepted then declined by [facility] due to crutch. Pt stated that he is able to ambulate without crutch. Sw called (Lucy ([phone]) and left message.  Electronically signed by SAVI Doss on 8/1/2025 at 9:35 AM    Addendum:Sw spoke with [facility],they continue to decline pt for various reasons ie:incontinence,desaturation and concerned if pt can do flights of stairs. Pt stated that he will just need a ride to his car and he will figure it out. Pt declined rescue mission. Sw will provide rescue mission number and taxi voucher from (Prs). [facility] called and stated they will bring up pt's belongings. Franklin spoke with Tamiko from [phone] and updated on dc plan. Pt stated that if his check arrived,his son will pick him up.  Electronically signed by SAVI Doss on 8/1/2025 at 2:24 PM    Addendum; Franklin [facility]  Electronically signed by SAVI Doss on 8/1/2025 at 3:18 PM

## 2025-08-01 NOTE — PROGRESS NOTES
[facility] Hospitalist Progress Note    SYNOPSIS: Patient admitted on 2025 for Acute respiratory failure with hypoxia (HCC)  70 y.o. male who presented to [facility] with concern for abdominal pain, diarrhea and low back pain.  He was found to be hypoxic 88% on room air in triage.  Drug screen positive for benzodiazepine, cocaine metabolites, fentanyl and methadone. Chest x-ray was negative for any acute findings.  CT angiogram pulmonary was negative for pulmonary embolism or acute pulmonary findings.  CT abdomen pelvis with fluid-filled large bowels recommend to correlate with gastroenteritis.  EKG sinus bradycardia heart rate of 57.  Troponin of 19 and 15 proBNP of 89.  Initial blood pressure presentation was 95/58.  He did receive a liter of normal saline bolus and blood pressure did improve to 114/68.  Given hypoxia there is concern for COPD exacerbation IV Solu-Medrol pretreatment was ordered.  Patient mentioned lost maybe 20 pounds over the last 3 to 6 months as he has not had much of an appetite compared to previous. Oncology consulted  Patient was admitted for acute COPD exacerbation ( pulmonary was consulted) and viral gastroenteritis.         In hospital, patient got iv steroid and breathing treatment; o2 requirement improved and was on room air before discharge  Patient's diarrhea was initially watery but later was soft and less frequent; c diff and stool cx negative; likely related to recent drug abuse; patient is pain medication seeking and is very demanding; seeking more methadone; palliative care consulted  Oncology evaluated the patient    SUBJECTIVE:  Stable overnight. No other overnight issues reported.   Patient seen and examined  Records reviewed.           Temp (24hrs), Av.6 °F (36.4 °C), Min:97 °F (36.1 °C), Max:98.5 °F (36.9 °C)    DIET: ADULT DIET; Regular  ADULT ORAL NUTRITION SUPPLEMENT; Breakfast; Low Calorie/High Protein Oral Supplement  CODE: Full  commands     ASSESSMENT and PLAN:      Acute hypoxic respiratory failure 2/2 copd exacerbation   On room air  Continue steroid taper  Breathing treatment    Unintentional weight loss  Oncology on board      Gastroenteritis  Fecal cx is negative  C diff negative  Symptomatic treatment  Likely polysubstance induced    Pain meds seeking behaviour         DVT Prophylaxis [] Lovenox, []  Heparin, [] SCDs, [] Ambulation   GI Prophylaxis [] PPI,  [] H2 Blocker,  [] Carafate,  [] Diet/Tube Feeds   Disposition Patient requires continued admission due to pending placement to [place] pending pt eval   MDM [] Low, [] Moderate,[]  High       Medications:  REVIEWED DAILY    Infusion Medications    sodium chloride Stopped (07/31/25 9005)     Scheduled Medications    sodium chloride flush  5-40 mL IntraVENous 2 times per day    enoxaparin  40 mg SubCUTAneous Daily    ipratropium 0.5 mg-albuterol 2.5 mg  1 Dose Inhalation Q4H RT    predniSONE  40 mg Oral Daily    arformoterol tartrate  15 mcg Nebulization BID    budesonide  500 mcg Nebulization BID RT    fluticasone  1 spray Each Nostril Daily    pantoprazole  40 mg Oral QAM AC    methadone  135 mg Oral Daily    sucralfate  1 g Oral 4x Daily    aspirin  81 mg Oral Daily    nicotine  1 patch TransDERmal Daily    guaiFENesin  400 mg Oral TID    cetirizine  10 mg Oral Daily     PRN Meds: sodium chloride flush, sodium chloride, ondansetron **OR** ondansetron, polyethylene glycol, acetaminophen **OR** acetaminophen, albuterol, benzonatate, sulfur hexafluoride microspheres    Labs:     Recent Labs     07/29/25  1730 07/31/25  0730   WBC 9.0 13.3*   HGB 13.3 11.0*   HCT 40.8 34.0*    205       Recent Labs     07/29/25  1730 07/31/25  0730    138   K 4.2 4.3    107   CO2 22 21*   BUN 28* 16   CREATININE 1.0 0.7   CALCIUM 9.1 9.0       Recent Labs     07/29/25  1730   ALKPHOS 87   ALT 11   AST 22   BILITOT 0.2       No results for input(s): \"INR\" in the last 72

## 2025-08-01 NOTE — CARE COORDINATION
Peer Recovery Support Note       Name:  Krish De Paz   Date:    8/1/2025        Chief Complaint   Patient presents with    Abdominal Pain    Diarrhea    Extremity Weakness     Right leg weakness. Diarrhea and abdominal pain. [facility].            Peer Support met with patient.  [x] Support and education provided  [x] Resources provided   [] Treatment referral:   [] Other:   [] Patient declined peer recovery services      Referred By: Bessy (CARMENZA)     Notes: Peer met with patient. Patient was willing and eager to engage with peer recovery services. Peer witnessed patient in a very emotional state; in tears. Feeling as if [facility] no longer wants to help him in his journey to recovery. Patient shared numerous stories about how he has been treated. Patient is interested in doing in going wherever he can for help. [facility] [place], [place]. Patient is willing. Taxi voucher submitted to  so that patient can retrieve his vehicle [facility] and pursue travels to [place].

## 2025-08-01 NOTE — PROGRESS NOTES
4 Eyes Skin Assessment     NAME:  Krish De Paz  YOB: 1955  MEDICAL RECORD NUMBER:  [id number]    The patient is being assessed for  Admission    I agree that at least one RN has performed a thorough Head to Toe Skin Assessment on the patient. ALL assessment sites listed below have been assessed.      Areas assessed by both nurses:    Head, Face, Ears, Shoulders, Back, Chest, Arms, Elbows, Hands, Sacrum. Buttock, Coccyx, Ischium, Legs. Feet and Heels, and Under Medical Devices         Does the Patient have a Wound? No noted wound(s)       Zion Prevention initiated by RN: Yes  Wound Care Orders initiated by RN: No    For hospital-acquired stage 1 & 2 and ALL Stage 3,4, Unstageable, DTI, NWPT, and Complex wounds: place order “IP Wound Care/Ostomy Nurse Eval and Treat” by RN under : No    New Ostomies, if present place, Ostomy referral order under : No     Nurse 1 eSignature: Electronically signed by Li Hudson RN on 7/31/25 at 9:17 PM EDT    **SHARE this note so that the co-signing nurse can place an eSignature**    Nurse 2 eSignature: Electronically signed by Teri Fisher RN on 7/31/25 at 9:24 PM EDT

## 2025-08-01 NOTE — CONSULTS
Gastroenterology, Hepatology, &  Advanced Endoscopy    Consult Note  Per staff  Patient adamant about leaving now. Son in ER parking lot , patient refused discharge education and didn't want to wait. IV removed in elevator by RN . Patient taken out by wheelchair.     Pt left before I was able to do the consult.    Reason for Consult: establishing care for outpatient EGD and colonoscopy     HPI:   Krish De Paz is a 70 y.o. male w/ PMH of  has a past medical history of Alcoholic (HCC), CHF (congestive heart failure) (HCC), Depression, Depression, Hep B w/ coma, chronic, w/ delta (HCC), Hep C w/ coma, chronic, Heroin abuse (HCC), Heroin abuse (HCC), Sleep apnea, obstructive, Suicide attempt (HCC), and Suicide attempt (HCC). who presents to the ED concern for abdominal pain, diarrhea and low back pain. He was found to be hypoxic 88% on room air in triage. Drug screen positive for benzodiazepine, cocaine metabolites, fentanyl and methadone. Chest x-ray was negative for any acute findings. CT angiogram pulmonary was negative for pulmonary embolism or acute pulmonary findings. CT abdomen pelvis with fluid-filled large bowels recommend to correlate with gastroenteritis. EKG sinus bradycardia heart rate of 57. Troponin of 19 and 15 proBNP of 89. Initial blood pressure presentation was 95/58. He did receive a liter of normal saline bolus and blood pressure did improve to 114/68. Given hypoxia there is concern for COPD exacerbation IV Solu-Medrol pretreatment was ordered. Patient mentioned lost maybe 20 pounds over the last 3 to 6 months as he has not had much of an appetite compared to previous.       PMH:       Diagnosis Date    Alcoholic (HCC)     CHF (congestive heart failure) (HCC)     Depression     Depression     Hep B w/ coma, chronic, w/ delta (HCC)     Hep C w/ coma, chronic     Heroin abuse (HCC)     Heroin abuse (HCC)     Sleep apnea, obstructive     Suicide attempt (HCC)     Suicide attempt (HCC)      The wall  thickness measures 2 mm. The CBD is of normal caliber measuring  3.9 mm. Positive Santana's sign was noted during the study. No free  fluid is identified.    Impression  Sludge in the gallbladder lumen. No evidence of  cholelithiasis or acute cholecystitis.     CT Result (most recent):  CT ABDOMEN PELVIS W IV CONTRAST 07/30/2025    Narrative  EXAMINATION:  CT OF THE ABDOMEN AND PELVIS WITH CONTRAST 7/29/2025 11:59 pm    TECHNIQUE:  CT of the abdomen and pelvis was performed with the administration of  intravenous contrast. Multiplanar reformatted images are provided for review.  Automated exposure control, iterative reconstruction, and/or weight based  adjustment of the mA/kV was utilized to reduce the radiation dose to as low  as reasonably achievable.    COMPARISON:  None.    HISTORY:  ORDERING SYSTEM PROVIDED HISTORY: abdominal pain, diarrhea  TECHNOLOGIST PROVIDED HISTORY:  Reason for exam:->abdominal pain, diarrhea  Additional Contrast?->None  Decision Support Exception - unselect if not a suspected or confirmed  emergency medical condition->Emergency Medical Condition (MA)  What reading provider will be dictating this exam?->CRC    FINDINGS:  Lower Chest:   Unremarkable.    Organs: The liver, spleen, kidneys, adrenal glands, pancreas gallbladder are  normal.    GI/bowel: Fluid-filled large bowel loops.  Normal small bowel.    Pelvis: Normal urinary bladder.    Osseous structures and soft tissues: Degenerative changes bilateral hip  joints.    Impression  Fluid-filled large bowel loops.  Correlate with gastroenteritis clinically.     MRI Result (most recent):  No results found for this or any previous visit from the past 3650 days.         Recent Labs     07/29/25  1730   ALT 11   AST 22   ALKPHOS 87   BILITOT 0.2     Lab Results   Component Value Date    WBC 13.3 (H) 07/31/2025    HGB 11.0 (L) 07/31/2025    HCT 34.0 (L) 07/31/2025     07/31/2025     07/31/2025    K 4.3 07/31/2025    CL

## 2025-08-01 NOTE — PLAN OF CARE
Problem: Chronic Conditions and Co-morbidities  Goal: Patient's chronic conditions and co-morbidity symptoms are monitored and maintained or improved  7/31/2025 2104 by Li Zepeda RN  Outcome: Progressing     Problem: Discharge Planning  Goal: Discharge to home or other facility with appropriate resources  7/31/2025 2104 by Li Zepeda RN  Outcome: Progressing     Problem: Safety - Adult  Goal: Free from fall injury  7/31/2025 2104 by Li Zepeda RN  Outcome: Progressing     Problem: Respiratory - Adult  Goal: Achieves optimal ventilation and oxygenation  7/31/2025 2104 by Li Zepeda RN  Outcome: Progressing     Problem: Musculoskeletal - Adult  Goal: Return mobility to safest level of function  7/31/2025 2104 by Li Zepeda RN  Outcome: Progressing

## 2025-08-01 NOTE — PROGRESS NOTES
Discharge order in . Patient adamant about leaving now. Son in ER parking lot , patient refused discharge education and didn't want to wait. IV removed in elevator by RN . Patient taken out by wheelchair.

## 2025-08-01 NOTE — PROGRESS NOTES
Pt is refusing bed alarm. RN educated pt on the importance of calling for assistance when ambulating to and from the bathroom. Pt insisting that the alarm on his bed not be turned on. Charge RN notified.

## 2025-08-03 LAB
ALPHA-1 ANTITRYPSIN PHENOTYPE: NORMAL
ALPHA-1 ANTITRYPSIN: 198 MG/DL (ref 90–200)
MICROORGANISM SPEC CULT: NORMAL
MICROORGANISM SPEC CULT: NORMAL
SERVICE CMNT-IMP: NORMAL
SERVICE CMNT-IMP: NORMAL
SPECIMEN DESCRIPTION: NORMAL
SPECIMEN DESCRIPTION: NORMAL

## 2025-08-05 LAB
MICROORGANISM SPEC CULT: NORMAL
MICROORGANISM SPEC CULT: NORMAL
SERVICE CMNT-IMP: NORMAL
SERVICE CMNT-IMP: NORMAL
SPECIMEN DESCRIPTION: NORMAL
SPECIMEN DESCRIPTION: NORMAL